# Patient Record
Sex: FEMALE | Race: WHITE | NOT HISPANIC OR LATINO | Employment: OTHER | URBAN - NONMETROPOLITAN AREA
[De-identification: names, ages, dates, MRNs, and addresses within clinical notes are randomized per-mention and may not be internally consistent; named-entity substitution may affect disease eponyms.]

---

## 2017-01-17 ENCOUNTER — HOSPITAL ENCOUNTER (EMERGENCY)
Facility: HOSPITAL | Age: 62
Discharge: HOME | End: 2017-01-17
Attending: NURSE PRACTITIONER | Admitting: NURSE PRACTITIONER
Payer: COMMERCIAL

## 2017-01-17 VITALS
RESPIRATION RATE: 16 BRPM | TEMPERATURE: 98.3 F | OXYGEN SATURATION: 96 % | HEIGHT: 71 IN | SYSTOLIC BLOOD PRESSURE: 119 MMHG | DIASTOLIC BLOOD PRESSURE: 65 MMHG

## 2017-01-17 DIAGNOSIS — H92.01 OTALGIA, RIGHT: ICD-10-CM

## 2017-01-17 PROCEDURE — 99212 OFFICE O/P EST SF 10 MIN: CPT

## 2017-01-17 PROCEDURE — 99212 OFFICE O/P EST SF 10 MIN: CPT | Performed by: NURSE PRACTITIONER

## 2017-01-17 ASSESSMENT — ENCOUNTER SYMPTOMS
RHINORRHEA: 1
SORE THROAT: 0
COUGH: 0
FEVER: 0
ABDOMINAL PAIN: 0
FACIAL SWELLING: 0

## 2017-01-17 NOTE — ED AVS SNAPSHOT
[facility]    [address] [address]    Phone:  [phone]                                       Shefali Dailey   MRN: [id number]    Department:  [facility]   Date of Visit:  1/17/2017           After Visit Summary Signature Page     I have received my discharge instructions, and my questions have been answered. I have discussed any challenges I see with this plan with the nurse or doctor.    ..........................................................................................................................................  Patient/Patient Representative Signature      ..........................................................................................................................................  Patient Representative Print Name and Relationship to Patient    ..................................................               ................................................  Date                                            Time    ..........................................................................................................................................  Reviewed by Signature/Title    ...................................................              ..............................................  Date                                                            Time

## 2017-01-17 NOTE — ED AVS SNAPSHOT
"` `     [facility]: [phone]                                              INTERAGENCY TRANSFER FORM - NURSING   2017                    Hospital Admission Date: 2017  RUDOLPH JOHNSON   : 1955  Sex: Female        Attending Provider: Mirtha Gonzales NP     Allergies:  Benoxinate Hcl, Fluorescein Sodium    Infection:  None   Service:  URGENT CARE    Ht:  1.803 m (5' 11\")   Wt:  --   Admission Wt:  --    BMI:  --   BSA:  --            Patient PCP Information     Provider PCP Type    Yosef Gonzales MD General      Current Code Status     This patient does not have a recorded code status. Please follow your organizational policy for patients in this situation.      Code Status History     This patient does not have a recorded code status. Please follow your organizational policy for patients in this situation.      Advance Directives        Does patient have a scanned Advance Directive/ACP document in EPIC?           No        Hospital Problems as of 2017     None      Non-Hospital Problems as of 2017              Priority Class Noted    Advanced care planning/counseling discussion Medium  2013      Immunizations     None         END      ASSESSMENT     Discharge Profile Flowsheet     COMMUNICATION ASSESSMENT     Patient's communication style  spoken language (English or Bilingual) 17 1636            Vitals     Vital Signs Flowsheet     VITAL SIGNS     O2 Device  None (Room air) 17 1636    Temp  98.3  F (36.8  C) 17 1636   PAIN/COMFORT      Temp src  Oral 17 1636   Patient's Stated Pain Goal  No pain 17 1636    Resp  16 17 1636   0-10 Pain Scale  4 17 1636    Heart Rate  71 17 1636   HEIGHT AND WEIGHT      Pulse/Heart Rate Source  Monitor 17 1636   Height  1.803 m (5' 11\") 17 1636    BP  119/65 mmHg 17 1636   Height Method  Stated 17 1636    OXYGEN THERAPY     Estimated Weight (From ED)  95.255 " kg (210 lb) 01/17/17 1636    SpO2  96 % 01/17/17 1636                 Patient Lines/Drains/Airways Status    Active LINES/DRAINS/AIRWAYS     Name: Placement date: Placement time: Site: Days: Last dressing change:    Peripheral IV 04/09/15 Right 04/09/15  0831    649     Incision/Surgical Site 04/09/15 Left Knee 04/09/15  0946   649             Patient Lines/Drains/Airways Status    Active PICC/CVC     **None**            Intake/Output Detail Report     None      Case Management/Discharge Planning     Case Management/Discharge Planning Flowsheet     LIVING ENVIRONMENT     QUESTION TO PATIENT: Do you feel safe going back to the place where you are living?  yes 01/17/17 1641    Lives With  significant other 04/09/15 0830   OBSERVATION: Is there reason to believe there has been maltreatment of a vulnerable adult (ie. Physical/Sexual/Emotional abuse, self neglect, lack of adequate food, shelter, medical care, or financial exploitation)?  no 01/17/17 1641    ABUSE RISK SCREEN     HOMICIDE RISK      QUESTION TO PATIENT:  Has a member of your family or a partner(now or in the past) intimidated, hurt, manipulated, or controlled you in any way?  no 01/17/17 1641   Homicidal Ideation  no 01/17/17 1641

## 2017-01-17 NOTE — ED AVS SNAPSHOT
[facility]    [address]    [facility] [address]    Phone:  [phone]                                       Shefali Dailey   MRN: [id number]    Department:  [facility]   Date of Visit:  1/17/2017           Patient Information     Date Of Birth          1955        Your diagnoses for this visit were:     Otalgia, right        You were seen by Mirtha Gonzales NP.      Follow-up Information     Follow up with Yosef Gonzales MD In 2 days.    Specialty:  Family Practice    Why:  If not improving     Contact information:     [facility]  [address] [place] [address] [phone]          Follow up with [facility].    Specialty:  EMERGENCY MEDICINE    Why:  If symptoms worsen    Contact information:    [address] [address] [phone]    Additional information:    From [place] Area: Take [address]. Turn left at [address]. Turn left at the first stoplight on [address]. At the first stop sign, take a right onto [address]. Take a left into the parking lot and continue through until you reach the North enterance of the building.       From [place]: Take [address]. Take the [address] ramp towards [place]. Turn left onto [address]. Take a slight right onto [address] [facility]. Turn left at the first stoplight on [address]. At the first stop sign, take a right onto [address]. Take a left into the parking lot and continue through until you reach the North enterance of the building.       From [place]: Take [address]. Take a right at [address]. At the first stop sign, take a right onto [address]. Take a left into the parking lot and continue through until you reach the North enterance of the building.         Discharge Instructions         Fluid in the Middle Ear, No Infection (Adult)  Earaches can happen without an infection.  This occurs when air and fluid build up behind the eardrum causing a feeling of fullness and discomfort and reduced hearing. This is called otitis media with effusion (OME) or serous otitis media. It means there is fluid in the middle ear. It is not the same as acute otitis media, which is typically from infection.  OME can happen when you have a cold if congestion blocks the passage that drains the middle ear (eustachian tube). It may also occur with nasal allergies or after a bacterial middle ear infection.    The pain/discomfort may come and go. You may hear clicking or popping sounds when you chew or swallow. You may feel that your balance is off. Or you may hear ringing in the ear.  It often takes from several weeks up to 3 months for the fluid to clear on its own. Oral pain relievers and ear drops help if there is pain. Decongestants and antihistamines sometimes help. Antibiotics don't help since there is no infection. Your doctor may prescribe a nasal spray to help reduce swelling in the nose and eustachian tube. This can allow the ear to drain.  If it doesn't improve after 3 months, surgery may be used to drain the fluid and insert a small tube in the eardrum to allow continued drainage.  Because the middle ear fluid can become infected, it is important to watch for signs of an ear infection which may develop later. These signs include increased ear pain, fever, or drainage from the ear.  Home care  The following guidelines will help you care for yourself at home:    You may use acetaminophen or ibuprofen to control pain, unless another medicine was prescribed. [NOTE: If you have chronic liver or kidney disease or ever had a stomach ulcer or GI bleeding, talk with your doctor before using these medicines.] (Aspirin should never be used in anyone under 18 years of age who is ill with a fever. It may cause severe liver damage.)    While not always helpful, you may use over-the-counter decongestants such as  phenylephrine or pseudoephedrine. Don't use nasal spray decongestants more than 3 days. Longer use can make congestion worse. (Prescription nasal sprays from your doctor don't typically have those restrictions.)    Antihistamines may help if you are also having allergy symptoms.    You may use medicines such as guaifenesin to thin mucus and promote drainage.  Follow-up care  Follow up with your doctor or as advised if you are not feeling better after 3 days.  When to seek medical care  Get prompt medical attention if any of the following occur:    Ear pain gets worse or does not start to improve     Fever of 100.4 F (38 C) or higher, or as directed by your health care provider    Fluid or blood draining from the ear    Headache or sinus pain    Stiff neck    Unusual drowsiness or confusion    5536-2471 The [employer]. [address] [place] [address]. All rights reserved. This information is not intended as a substitute for professional medical care. Always follow your healthcare professional's instructions.             Review of your medicines      Our records show that you are taking the medicines listed below. If these are incorrect, please call your family doctor or clinic.        Dose / Directions Last dose taken    ibuprofen 200 MG capsule   Dose:  600 mg        Take 600 mg by mouth every 6 hours as needed for fever   Refills:  0                Orders Needing Specimen Collection     None      Pending Results     No orders found from 1/16/2017 to 1/18/2017.            Pending Culture Results     No orders found from 1/16/2017 to 1/18/2017.            Thank you for choosing [facility]       Thank you for choosing [facility] for your care. Our goal is always to provide you with excellent care. Hearing back from our patients is one way we can continue to improve our services. Please take a few minutes to complete the written survey that you may receive in the mail after you visit with us. Thank  you!        [employer] Information     [employer] gives you secure access to your electronic health record. If you see a primary care provider, you can also send messages to your care team and make appointments. If you have questions, please call your primary care clinic.  If you do not have a primary care provider, please call [phone] and they will assist you.        Care EveryWhere ID     This is your Care EveryWhere ID. This could be used by other organizations to access your [facility] medical records  [id number]        After Visit Summary       This is your record. Keep this with you and show to your community pharmacist(s) and doctor(s) at your next visit.

## 2017-01-17 NOTE — ED PROVIDER NOTES
"  History     Chief Complaint   Patient presents with     Otalgia     Pt states pain in behind right ear going into right side of neck, starting this morning.     The history is provided by the patient. No  was used.     Shefali Dailey is a 61 year old female who presents with R ear pain radiating around, some neck pain that started this morning. Has had a runny nose. No cough.   No fever, good appetite. SO is starting chemo tomorrow and she can't be sick. Few weeks ago had GI sx, resolved now.    I have reviewed the Medications, Allergies, Past Medical and Surgical History, and Social History in the Epic system.    Review of Systems   Constitutional: Negative for fever.   HENT: Positive for ear pain and rhinorrhea. Negative for congestion, facial swelling and sore throat.    Respiratory: Negative for cough.    Gastrointestinal: Negative for abdominal pain.       Physical Exam   BP: 119/65 mmHg  Heart Rate: 71  Temp: 98.3  F (36.8  C)  Resp: 16  Height: 180.3 cm (5' 11\")  SpO2: 96 %  Physical Exam   Constitutional: She is oriented to person, place, and time. She appears well-developed and well-nourished. No distress.   HENT:   Head: Normocephalic and atraumatic.   Right Ear: Tympanic membrane and external ear normal.   Left Ear: Tympanic membrane and external ear normal.   Nose: Nose normal.   Mouth/Throat: Uvula is midline, oropharynx is clear and moist and mucous membranes are normal. No oropharyngeal exudate.   Eyes: Conjunctivae are normal. No scleral icterus.   Neck: Normal range of motion. Neck supple.   Cardiovascular: Normal rate, regular rhythm and normal heart sounds.    Pulmonary/Chest: Effort normal and breath sounds normal. No respiratory distress.   Musculoskeletal: Normal range of motion.   Lymphadenopathy:     She has no cervical adenopathy.   Neurological: She is alert and oriented to person, place, and time.   Skin: Skin is warm and dry. She is not diaphoretic.   Psychiatric: " She has a normal mood and affect.   Nursing note and vitals reviewed.      ED Course   Procedures          Patient declined lab work.    Assessments & Plan (with Medical Decision Making)     I have reviewed the nursing notes.    I have reviewed the findings, diagnosis, plan and need for follow up with the patient.  Patient verbally educated and given appropriate education sheets for each of their diagnoses and has no questions.  Take ibuprofen or Tylenol as directed on the bottle as needed.  Can use decongestant to help clear sinuses and ear pain.   Warm packs to the ear.   Return to ED/UC if symptoms worsen or concerns develop  If symptoms persist 7-10 days follow up with PCP for re-evaluation.      Final diagnoses:   Otalgia, right       1/17/2017   [facility]      Mirtha Gonzales NP  01/17/17 0999

## 2017-01-17 NOTE — DISCHARGE INSTRUCTIONS
Fluid in the Middle Ear, No Infection (Adult)  Earaches can happen without an infection. This occurs when air and fluid build up behind the eardrum causing a feeling of fullness and discomfort and reduced hearing. This is called otitis media with effusion (OME) or serous otitis media. It means there is fluid in the middle ear. It is not the same as acute otitis media, which is typically from infection.  OME can happen when you have a cold if congestion blocks the passage that drains the middle ear (eustachian tube). It may also occur with nasal allergies or after a bacterial middle ear infection.    The pain/discomfort may come and go. You may hear clicking or popping sounds when you chew or swallow. You may feel that your balance is off. Or you may hear ringing in the ear.  It often takes from several weeks up to 3 months for the fluid to clear on its own. Oral pain relievers and ear drops help if there is pain. Decongestants and antihistamines sometimes help. Antibiotics don't help since there is no infection. Your doctor may prescribe a nasal spray to help reduce swelling in the nose and eustachian tube. This can allow the ear to drain.  If it doesn't improve after 3 months, surgery may be used to drain the fluid and insert a small tube in the eardrum to allow continued drainage.  Because the middle ear fluid can become infected, it is important to watch for signs of an ear infection which may develop later. These signs include increased ear pain, fever, or drainage from the ear.  Home care  The following guidelines will help you care for yourself at home:    You may use acetaminophen or ibuprofen to control pain, unless another medicine was prescribed. [NOTE: If you have chronic liver or kidney disease or ever had a stomach ulcer or GI bleeding, talk with your doctor before using these medicines.] (Aspirin should never be used in anyone under 18 years of age who is ill with a fever. It may cause severe liver  damage.)    While not always helpful, you may use over-the-counter decongestants such as phenylephrine or pseudoephedrine. Don't use nasal spray decongestants more than 3 days. Longer use can make congestion worse. (Prescription nasal sprays from your doctor don't typically have those restrictions.)    Antihistamines may help if you are also having allergy symptoms.    You may use medicines such as guaifenesin to thin mucus and promote drainage.  Follow-up care  Follow up with your doctor or as advised if you are not feeling better after 3 days.  When to seek medical care  Get prompt medical attention if any of the following occur:    Ear pain gets worse or does not start to improve     Fever of 100.4 F (38 C) or higher, or as directed by your health care provider    Fluid or blood draining from the ear    Headache or sinus pain    Stiff neck    Unusual drowsiness or confusion    2681-1085 [employer]. [address] [place] [address], [place] [address]. All rights reserved. This information is not intended as a substitute for professional medical care. Always follow your healthcare professional's instructions.

## 2017-04-20 ENCOUNTER — TELEPHONE (OUTPATIENT)
Dept: FAMILY MEDICINE | Facility: OTHER | Age: 62
End: 2017-04-20

## 2017-04-20 ENCOUNTER — OFFICE VISIT (OUTPATIENT)
Dept: FAMILY MEDICINE | Facility: OTHER | Age: 62
End: 2017-04-20
Attending: FAMILY MEDICINE
Payer: COMMERCIAL

## 2017-04-20 ENCOUNTER — MYC MEDICAL ADVICE (OUTPATIENT)
Dept: FAMILY MEDICINE | Facility: OTHER | Age: 62
End: 2017-04-20

## 2017-04-20 VITALS
TEMPERATURE: 98 F | DIASTOLIC BLOOD PRESSURE: 72 MMHG | HEART RATE: 76 BPM | OXYGEN SATURATION: 96 % | HEIGHT: 71 IN | RESPIRATION RATE: 16 BRPM | WEIGHT: 210 LBS | SYSTOLIC BLOOD PRESSURE: 138 MMHG | BODY MASS INDEX: 29.4 KG/M2

## 2017-04-20 DIAGNOSIS — N64.4 BREAST PAIN: ICD-10-CM

## 2017-04-20 DIAGNOSIS — R07.9 LEFT SIDED CHEST PAIN: Primary | ICD-10-CM

## 2017-04-20 PROCEDURE — 71020 ZZHC CHEST TWO VIEWS, FRONT/LAT: CPT | Mod: TC | Performed by: RADIOLOGY

## 2017-04-20 PROCEDURE — 99214 OFFICE O/P EST MOD 30 MIN: CPT | Performed by: FAMILY MEDICINE

## 2017-04-20 RX ORDER — INDOMETHACIN 25 MG/1
25 CAPSULE ORAL 2 TIMES DAILY WITH MEALS
Qty: 42 CAPSULE | Refills: 1 | Status: SHIPPED | OUTPATIENT
Start: 2017-04-20 | End: 2018-01-25

## 2017-04-20 ASSESSMENT — PAIN SCALES - GENERAL: PAINLEVEL: MODERATE PAIN (5)

## 2017-04-20 NOTE — NURSING NOTE
"Chief Complaint   Patient presents with     Breast Pain     Left breast pain started yesterday. Denies injury. Bilateral mastestomy 2005 with reconstruction in 2006.       Initial /72  Pulse 76  Temp 98  F (36.7  C) (Tympanic)  Resp 16  Ht 5' 11\" (1.803 m)  Wt 210 lb (95.3 kg)  SpO2 96%  BMI 29.29 kg/m2 Estimated body mass index is 29.29 kg/(m^2) as calculated from the following:    Height as of this encounter: 5' 11\" (1.803 m).    Weight as of this encounter: 210 lb (95.3 kg).  Medication Reconciliation: complete   Rebekah Ledesma      "

## 2017-04-20 NOTE — MR AVS SNAPSHOT
After Visit Summary   4/20/2017    Shefali Dailey    MRN: [id number]           Patient Information     Date Of Birth          1955        Visit Information        Provider Department      4/20/2017 11:15 AM Yosef Gonzales MD [facility]        Today's Diagnoses     Left sided chest pain    -  1    Breast pain          Care Instructions    F/u with ongoing concerns.         Follow-ups after your visit        Additional Services     GENERAL SURG ADULT REFERRAL       Your provider has referred you to: Dr. Gill.     Please be aware that coverage of these services is subject to the terms and limitations of your health insurance plan.  Call member services at your health plan with any benefit or coverage questions.      Please bring the following with you to your appointment:    (1) Any X-Rays, CTs or MRIs which have been performed.  Contact the facility where they were done to arrange for  prior to your scheduled appointment.   (2) List of current medications   (3) This referral request   (4) Any documents/labs given to you for this referral                  Your next 10 appointments already scheduled     Apr 20, 2017 11:15 AM CDT   (Arrive by 11:00 AM)   SHORT with Yosef Gonzales MD   [facility] ([facility])    [address] [place] [address]   [phone]              Who to contact     If you have questions or need follow up information about today's clinic visit or your schedule please contact [facility] directly at [phone].  Normal or non-critical lab and imaging results will be communicated to you by MyChart, letter or phone within 4 business days after the clinic has received the results. If you do not hear from us within 7 days, please contact the clinic through MyChart or phone. If you have a critical or abnormal lab result, we will notify you by phone as soon as possible.  Submit refill requests through [employer] or  "call your pharmacy and they will forward the refill request to us. Please allow 3 business days for your refill to be completed.          Additional Information About Your Visit        [employer] Information     [employer] gives you secure access to your electronic health record. If you see a primary care provider, you can also send messages to your care team and make appointments. If you have questions, please call your primary care clinic.  If you do not have a primary care provider, please call [phone] and they will assist you.        Care EveryWhere ID     This is your Care EveryWhere ID. This could be used by other organizations to access your [place] medical records  [id number]        Your Vitals Were     Pulse Temperature Respirations Height Pulse Oximetry BMI (Body Mass Index)    76 98  F (36.7  C) (Tympanic) 16 5' 11\" (1.803 m) 96% 29.29 kg/m2       Blood Pressure from Last 3 Encounters:   04/20/17 138/72   01/17/17 119/65   05/19/16 94/58    Weight from Last 3 Encounters:   04/20/17 210 lb (95.3 kg)   05/19/16 214 lb (97.1 kg)   04/27/16 210 lb (95.3 kg)              We Performed the Following     GENERAL SURG ADULT REFERRAL     MR Breast Left w Contrast     XR CHEST 2 VW (Clinic Performed)          Today's Medication Changes          These changes are accurate as of: 4/20/17 10:34 AM.  If you have any questions, ask your nurse or doctor.               Start taking these medicines.        Dose/Directions    indomethacin 25 MG capsule   Commonly known as:  INDOCIN   Used for:  Left sided chest pain   Started by:  Yosef Gonzales MD        Dose:  25 mg   Take 1 capsule (25 mg) by mouth 2 times daily (with meals)   Quantity:  42 capsule   Refills:  1            Where to get your medicines      These medications were sent to [facility] - [address]  [address] [address]     Phone:  [phone]     indomethacin 25 MG capsule                Primary Care " Provider Office Phone # Fax #    Yosef Gonzales [phone]       [facility] [address]  [facility] [address]        Thank you!     Thank you for choosing [facility]  for your care. Our goal is always to provide you with excellent care. Hearing back from our patients is one way we can continue to improve our services. Please take a few minutes to complete the written survey that you may receive in the mail after your visit with us. Thank you!             Your Updated Medication List - Protect others around you: Learn how to safely use, store and throw away your medicines at www.disposemymeds.org.          This list is accurate as of: 4/20/17 10:34 AM.  Always use your most recent med list.                   Brand Name Dispense Instructions for use    ibuprofen 200 MG capsule      Take 600 mg by mouth every 6 hours as needed for fever       indomethacin 25 MG capsule    INDOCIN    42 capsule    Take 1 capsule (25 mg) by mouth 2 times daily (with meals)

## 2017-04-20 NOTE — TELEPHONE ENCOUNTER
12:52 PM    Reason for Call: Phone Call    Description: Shefali needs a more specific work note that states that she needs to be off the rest of today and can return to work tomorrow. Please call Shefali.  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     Was an appointment offered for this call?  No    Preferred method for responding to this message: [phone]    If we cannot reach you directly, may we leave a detailed response at the number you provided?  Yes      Alisha Noland

## 2017-04-20 NOTE — PROGRESS NOTES
Shefali Dailey    April 20, 2017    Chief Complaint   Patient presents with     Breast Pain     Left breast pain started yesterday. Denies injury. Bilateral mastestomy 2005 with reconstruction in 2006.       SUBJECTIVE:  Here for left breast pain.  Had pain about 2-3 months ago after reaching off the bed to pick something up int he left breast region.  Now having significant pain without any provocation.  It got all the way better and now this is severe without any change in anything.  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      Past Medical History:   Diagnosis Date     Malignant neoplasm of breast (female), unspecified 11/28/2005       Past Surgical History:   Procedure Laterality Date     APPENDECTOMY  2004     ARTHROSCOPY KNEE Left 4/9/2015    Procedure: ARTHROSCOPY KNEE;  Surgeon: Arden Oreilly MD;  Location: [facility]     bilateral mastectomy      Bilateral, lymph nodes removed from left side     breast reconstruction  6/2006    Bilateral, expansion with saline implants     colonoscopy with biopsy  2012     DILATION AND CURETTAGE      D&C, multiple     EGD with biopsy  2012     needle loc breast biopsy  2005    LT     RAJESH/BSO  2004     TONSILLECTOMY  1961       Current Outpatient Prescriptions   Medication Sig Dispense Refill     indomethacin (INDOCIN) 25 MG capsule Take 1 capsule (25 mg) by mouth 2 times daily (with meals) 42 capsule 1     ibuprofen 200 MG capsule Take 600 mg by mouth every 6 hours as needed for fever         Allergies   Allergen Reactions     Benoxinate Hcl Other (See Comments), Itching and Swelling     Burning  Fluress       Fluorescein Sodium Other (See Comments), Itching and Swelling     Burning  Fluress       Family History   Problem Relation Age of Onset     DIABETES Mother      Myocardial Infarction Mother 72     myocardial infarction, cause of death     Other - See Comments Father 63     liver cirrhosis      CEREBROVASCULAR DISEASE Father 64     cva, cause of death     Breast Cancer Daughter      Other - See Comments Sister      cholelithiasis     Other - See Comments Sister      DIABETES Sister      IDDM     Cancer - colorectal Sister      59     DIABETES Sister      diet controlled     Other - See Comments Sister      billary cirrhosis, stage 4       Social History     Social History     Marital status:      Spouse name: N/A     Number of children: N/A     Years of education: N/A     Occupational History     Not on file.     Social History Main Topics     Smoking status: Current Every Day Smoker     Packs/day: 0.50     Years: 34.00     Types: Cigarettes     Smokeless tobacco: Never Used      Comment: declines referral to [place] quitline     Alcohol use 0.0 oz/week     0 Standard drinks or equivalent per week      Comment: rarely     Drug use: No     Sexual activity: Not on file     Other Topics Concern     Blood Transfusions Yes     Caffeine Concern Yes     coffee 6 cups daily     Parent/Sibling W/ Cabg, Mi Or Angioplasty Before 65f 55m? No     Social History Narrative       5 point ROS negative except as noted above in HPI, including Gen., Resp., CV, GI &  system review.     OBJECTIVE:  B/P: 138/72, T: 98, P: 76, R: 16    GENERAL APPEARANCE: Alert, no acute distress  CV: regular rate and rhythm, no murmur, rub or gallop  RESP: lungs clear to auscultation bilaterally  ABDOMEN: normal bowel sounds, soft, nontender, no hepatosplenomegaly or other masses  Breasts bilateral prostheses.  Normal axillary.  I could not reporduce the pain on the left side.    SKIN: no suspicious lesions or rashes to visualized skin  NEURO: Alert, oriented x 3, speech and mentation normal    ASSESSMENT and PLAN:  (R07.9) Left sided chest pain  (primary encounter diagnosis)  Comment: consider breast.  Consider costochondritis.    Plan: XR CHEST 2 VW (Clinic Performed), GENERAL SURG         ADULT REFERRAL, MR Breast Left w Contrast,          indomethacin (INDOCIN) 25 MG capsule        Discussed.  Asking for Dr. Gill's help.  Getting breast MRI in anticipation of the visit.  Appreciate her help.  Trial of indocin in the meantime.      (N64.4) Breast pain  Comment: as above.    Plan: GENERAL SURG ADULT REFERRAL, MR Breast Left w         Contrast        Not sure if it's the breast the not.  I favor the ribs.  I am working up and treating as above.  Note for work as she can't really use the left arm in that manner.

## 2017-04-20 NOTE — TELEPHONE ENCOUNTER
The atelectasis is a direct result of not taking deep breaths, which is a direct result of the pain.  This will not affect what the MRI shows at all.  This does not make me think of any kind of cancerous process at all.  Thanks.  .Yosef Gonzales

## 2017-04-20 NOTE — LETTER
[facility]  [address]  [facility] [address]  [phone]  Dept: [phone]      4/20/2017      Re: Shefali Dailey      TO WHOM IT MAY CONCERN:    Shefali Dailey  was seen on 04/20/17.  Please excuse her on 04/20/17. She may return to work on 04/21/17 without restrictions.       Cordially,      Yosef Gonzales MD  [facility]

## 2017-04-21 ENCOUNTER — HOSPITAL ENCOUNTER (OUTPATIENT)
Dept: MRI IMAGING | Facility: HOSPITAL | Age: 62
Discharge: HOME | End: 2017-04-21
Attending: FAMILY MEDICINE | Admitting: FAMILY MEDICINE
Payer: COMMERCIAL

## 2017-04-21 PROCEDURE — A9585 GADOBUTROL INJECTION: HCPCS | Mod: TC

## 2017-04-21 PROCEDURE — 0159T ZZHC COMPUTER AIDED BREAST MRI: CPT | Mod: TC

## 2017-04-21 PROCEDURE — 77059 ZZHC MRI BREAST BILATERAL WO&W CONTRAST: CPT | Mod: TC

## 2017-04-21 RX ORDER — GADOBUTROL 604.72 MG/ML
10 INJECTION INTRAVENOUS ONCE
Status: COMPLETED | OUTPATIENT
Start: 2017-04-21 | End: 2017-04-21

## 2017-04-21 RX ADMIN — GADOBUTROL 10 ML: 604.72 INJECTION INTRAVENOUS at 11:02

## 2017-04-25 ENCOUNTER — OFFICE VISIT (OUTPATIENT)
Dept: SURGERY | Facility: OTHER | Age: 62
End: 2017-04-25
Attending: SURGERY
Payer: COMMERCIAL

## 2017-04-25 VITALS
WEIGHT: 210 LBS | SYSTOLIC BLOOD PRESSURE: 132 MMHG | HEIGHT: 71 IN | DIASTOLIC BLOOD PRESSURE: 72 MMHG | OXYGEN SATURATION: 97 % | HEART RATE: 72 BPM | TEMPERATURE: 97.2 F | BODY MASS INDEX: 29.4 KG/M2

## 2017-04-25 DIAGNOSIS — N64.4 PAIN OF BOTH BREASTS: Primary | ICD-10-CM

## 2017-04-25 DIAGNOSIS — R07.81 RIB PAIN ON LEFT SIDE: ICD-10-CM

## 2017-04-25 PROCEDURE — 99203 OFFICE O/P NEW LOW 30 MIN: CPT | Performed by: SURGERY

## 2017-04-25 RX ORDER — PREDNISONE 20 MG/1
20 TABLET ORAL 2 TIMES DAILY
Qty: 10 TABLET | Refills: 0 | Status: SHIPPED | OUTPATIENT
Start: 2017-04-25 | End: 2017-05-03

## 2017-04-25 ASSESSMENT — PAIN SCALES - GENERAL: PAINLEVEL: NO PAIN (1)

## 2017-04-25 NOTE — MR AVS SNAPSHOT
After Visit Summary   4/25/2017    Shefali Dailey    MRN: [id number]           Patient Information     Date Of Birth          1955        Visit Information        Provider Department      4/25/2017 9:30 AM [facility]        Today's Diagnoses     Pain of both breasts    -  1    Rib pain on left side          Care Instructions    Thank you for allowing Dr. Gill and our surgical team to participate in your care.  If you have a scheduling or an appointment question please contact Ashley, our Health Unit Coordinator at her direct line [phone].   ALL nursing questions or concerns can be directed to your surgical nurse at: [phone]-Estefany      Your breast MRI showed no abnormality, problem with implants or suggestion of recurrent breast disease    Hold Indocin and take the prednisone 20 mg twice daily as recommended by Dr. Gonzales.  Followup with him Wednesday, May 3, 2017 - 3:30 pm.        Follow-ups after your visit        Who to contact     If you have questions or need follow up information about today's clinic visit or your schedule please contact [facility] directly at [phone].  Normal or non-critical lab and imaging results will be communicated to you by [employer], letter or phone within 4 business days after the clinic has received the results. If you do not hear from us within 7 days, please contact the clinic through [employer] or phone. If you have a critical or abnormal lab result, we will notify you by phone as soon as possible.  Submit refill requests through [email or web address] or call your pharmacy and they will forward the refill request to us. Please allow 3 business days for your refill to be completed.          Additional Information About Your Visit        [employer] Information     [email or web address] gives you secure access to your electronic health record. If you see a primary care provider, you can also send messages to your care team and make  "appointments. If you have questions, please call your primary care clinic.  If you do not have a primary care provider, please call [phone] and they will assist you.        Care EveryWhere ID     This is your Care EveryWhere ID. This could be used by other organizations to access your [place] medical records  [id number]        Your Vitals Were     Pulse Temperature Height Pulse Oximetry BMI (Body Mass Index)       72 97.2  F (36.2  C) (Tympanic) 5' 10.5\" (1.791 m) 97% 29.71 kg/m2        Blood Pressure from Last 3 Encounters:   04/25/17 132/72   04/20/17 138/72   01/17/17 119/65    Weight from Last 3 Encounters:   04/25/17 210 lb (95.3 kg)   04/20/17 210 lb (95.3 kg)   05/19/16 214 lb (97.1 kg)              Today, you had the following     No orders found for display         Today's Medication Changes          These changes are accurate as of: 4/25/17 10:24 AM.  If you have any questions, ask your nurse or doctor.               Start taking these medicines.        Dose/Directions    predniSONE 20 MG tablet   Commonly known as:  DELTASONE   Used for:  Rib pain on left side   Started by:  Mirtha Gill MD        Dose:  20 mg   Take 1 tablet (20 mg) by mouth 2 times daily   Quantity:  10 tablet   Refills:  0            Where to get your medicines      These medications were sent to [facility] [place] - [address]  [address] [place] [address]     Phone:  [phone]     predniSONE 20 MG tablet                Primary Care Provider Office Phone # Fax #    Yosef Gonzales [phone]       [facility] [address]  [facility] [address]        Thank you!     Thank you for choosing [facility]  for your care. Our goal is always to provide you with excellent care. Hearing back from our patients is one way we can continue to improve our services. Please take a few minutes to complete the written survey that you may receive in the mail after " your visit with us. Thank you!             Your Updated Medication List - Protect others around you: Learn how to safely use, store and throw away your medicines at www.disposemymeds.org.          This list is accurate as of: 4/25/17 10:24 AM.  Always use your most recent med list.                   Brand Name Dispense Instructions for use    ibuprofen 200 MG capsule      Take 600 mg by mouth every 6 hours as needed for fever       indomethacin 25 MG capsule    INDOCIN    42 capsule    Take 1 capsule (25 mg) by mouth 2 times daily (with meals)       predniSONE 20 MG tablet    DELTASONE    10 tablet    Take 1 tablet (20 mg) by mouth 2 times daily

## 2017-04-25 NOTE — PATIENT INSTRUCTIONS
Thank you for allowing Dr. Gill and our surgical team to participate in your care.  If you have a scheduling or an appointment question please contact Ashley, our Health Unit Coordinator at her direct line [phone].   ALL nursing questions or concerns can be directed to your surgical nurse at: [phone]-Estefany      Your breast MRI showed no abnormality, problem with implants or suggestion of recurrent breast disease    Hold Indocin and take the prednisone 20 mg twice daily as recommended by Dr. Gonzales.  Followup with him Wednesday, May 3, 2017 - 3:30 pm.

## 2017-04-25 NOTE — PROGRESS NOTES
"[facility] Surgery Consultation    CC:  Left chest wall pain    HPI:  This 61 year old year old female is seen at the request of Dr. Gonzales for evaluation of chest wall pain- she notes burning pain that occurs in the axilla and spreads over the anterior reconstructed chest wall.  It can be sharp, \"takes breath away\".  The pain is intermittent, but occurs hourly.  Each occurrence lasts approximately 5-10 minutes.    The patient recalls bending over a bed to retrieve a toy for her 5 year old grandchild and the pain then developed 4 days ago.  She was worried regarding the subpectoral implant integrity.  Bilateral gadolinium enhanced MRI showed no suggestion of residual breast parenchyma or implant capsular or prosthetic embarrassment.    The musculoskeletal nature of the process was appreciated by her primary care provider who was contacted and care discussed.  A steroid pulse was considered by both he and myself and during discussion with him regarding the history and findings and fact that she has responded to NSAID treatment.  Prednisone 20 mg bid x 5 days with holding NSAID recommended and we will facilitate this order.  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    Past Medical History:   Diagnosis Date     Malignant neoplasm of breast (female), unspecified 11/28/2005     Past Surgical History:   Procedure Laterality Date     APPENDECTOMY  2004     ARTHROSCOPY KNEE Left 4/9/2015    Procedure: ARTHROSCOPY KNEE;  Surgeon: Arden Oreilly MD;  Location: [facility]     bilateral mastectomy      Bilateral, lymph nodes removed from left side     breast reconstruction  6/2006    Bilateral, expansion with saline implants     colonoscopy with biopsy  2012     DILATION AND CURETTAGE      D&C, multiple     EGD with biopsy  2012     needle loc breast biopsy  2005    LT     RAJESH/BSO  2004     " "TONSILLECTOMY  1961     Current Outpatient Prescriptions   Medication     indomethacin (INDOCIN) 25 MG capsule     ibuprofen 200 MG capsule     No current facility-administered medications for this visit.      Allergies   Allergen Reactions     Benoxinate Hcl Other (See Comments), Itching and Swelling     Burning  Fluress       Fluorescein Sodium Other (See Comments), Itching and Swelling     Burning  Fluress     Fluorescein-Benoxinate Other (See Comments)     HABITS:    Social History   Substance Use Topics     Smoking status: Current Every Day Smoker     Packs/day: 0.50     Years: 34.00     Types: Cigarettes     Smokeless tobacco: Never Used      Comment: declines referral to [place] quitline     Alcohol use 0.0 oz/week     0 Standard drinks or equivalent per week      Comment: rarely       Family History   Problem Relation Age of Onset     DIABETES Mother      Myocardial Infarction Mother 72     myocardial infarction, cause of death     Other - See Comments Father 63     liver cirrhosis     CEREBROVASCULAR DISEASE Father 64     cva, cause of death     Breast Cancer Daughter      Other - See Comments Sister      cholelithiasis     Other - See Comments Sister      DIABETES Sister      IDDM     Cancer - colorectal Sister      59     DIABETES Sister      diet controlled     Other - See Comments Sister      billary cirrhosis, stage 4       REVIEW OF SYSTEMS:  Ten point review of systems negative except those mentioned in the HPI.     OBJECTIVE:    /72 (BP Location: Left arm, Patient Position: Chair, Cuff Size: Adult Regular)  Pulse 72  Temp 97.2  F (36.2  C) (Tympanic)  Ht 5' 10.5\" (1.791 m)  Wt 210 lb (95.3 kg)  SpO2 97%  BMI 29.71 kg/m2    GENERAL: Generally appears well, in no distress with appropriate affect.  HEENT:   Sclerae anicteric - No cervical, supra/infraclavciular lymphadenopathy, no thyroid masses  Respiratory:  Lungs clear to ausculation bilaterally with good air excursion  Cardiovascular:  " Regular Rate and Rhythm with no murmurs gallops or rubs, normal     There is tenderness when palpating the 4th rib in the anterior axillary line    Breast Examination:  In the seated position with the arms elevated, there is no contour distortion, skin change or nipple abnormality.  Bilateral reconstruction with good cosmetic result noted.  There is no contour or skin change suggesting recurrence.    In the supine position, the right chest wall and axilla show no abnormality.  The left breast shows a similar homogeneity devoid of abnormality.   There is tenderness in the left axillary apex at the latissimus border and along the intercostal muscles in the 3rd interspace, anterior axillary line.  There is no suggestion of mass or adenoparhy.  :  deferred  Extremities:  Extremities normal. No deformities, edema, or skin discoloration.  Skin:  no suspicious lesions or rashes  Neurological: grossly intact    Psych:  Alert, oriented, affect appropriate with normal decision making ability.    Imaging:  Reviewed.  BILATERAL BREAST MRI WITH/WITHOUT CONTRAST     INDICATION: A 61-year-old female with a history of bilateral  mastectomies in 2005 for breast cancer, who presents with left breast  pain.     COMPARISON: None available.     TECHNIQUE: Multiplanar MRI of the bilateral breasts was performed  including fluid and silicone suppressed sequences, as well as dynamic  post-contrast imaging. CAD stream was used for the calculation of  contrast enhancement kinetic curves and MIP reconstruction.     FINDINGS: Postoperative changes of bilateral mastectomies with  implant reconstruction are seen. No residual breast parenchymal  tissue is identified. No suspicious anterior chest wall enhancement  is seen.     The bilateral subpectoral breast implants are intact, without evidence  of intracapsular or extracapsular rupture.     No suspicious axillary mass or enhancement is seen.     No suspicious extramammary enhancement is  seen.     IMPRESSION: EXPECTED POSTOPERATIVE APPEARANCE FOLLOWING BILATERAL  MASTECTOMIES WITH IMPLANT RECONSTRUCTION. NO EVIDENCE OF IMPLANT  RUPTURE OR SUSPICIOUS ENHANCEMENT. BENIGN FINDINGS.       IMPRESSION:  Musculoskeletal pain, left chest wall, axilla with intercostal involvement.  No evidence of adenopathy or metastatic breast process.    PLAN:  Pathophysiology outlined in detail with management reviewed.  She wishes to continue work and, avoidance of use of the muscle groups suggested along with requesting cases where resting these muscle regions permissible reviewed.  She will speak with nursing managers.  Heat, NSAID and rest recommended.  Return non-resolution.    Dr. Gonzales contacted and findings discussed.  In view of desire to continue working, a short steroid course is recommended and his suggested orders were entered    Mirtha Gill MD, FACS    4/25/2017  9:57 AM    cc:  Yosef Gonzales MD

## 2017-05-03 ENCOUNTER — OFFICE VISIT (OUTPATIENT)
Dept: FAMILY MEDICINE | Facility: OTHER | Age: 62
End: 2017-05-03
Attending: FAMILY MEDICINE
Payer: COMMERCIAL

## 2017-05-03 VITALS
SYSTOLIC BLOOD PRESSURE: 118 MMHG | WEIGHT: 210 LBS | HEIGHT: 71 IN | DIASTOLIC BLOOD PRESSURE: 60 MMHG | OXYGEN SATURATION: 93 % | TEMPERATURE: 97.4 F | HEART RATE: 76 BPM | RESPIRATION RATE: 16 BRPM | BODY MASS INDEX: 29.4 KG/M2

## 2017-05-03 DIAGNOSIS — R07.89 ATYPICAL CHEST PAIN: Primary | ICD-10-CM

## 2017-05-03 DIAGNOSIS — Z71.89 ACP (ADVANCE CARE PLANNING): Chronic | ICD-10-CM

## 2017-05-03 DIAGNOSIS — R10.13 ABDOMINAL PAIN, EPIGASTRIC: ICD-10-CM

## 2017-05-03 LAB
BASOPHILS # BLD AUTO: 0 10E9/L (ref 0–0.2)
BASOPHILS NFR BLD AUTO: 0.2 %
DIFFERENTIAL METHOD BLD: NORMAL
EOSINOPHIL # BLD AUTO: 0.2 10E9/L (ref 0–0.7)
EOSINOPHIL NFR BLD AUTO: 1.5 %
ERYTHROCYTE [DISTWIDTH] IN BLOOD BY AUTOMATED COUNT: 14.3 % (ref 10–15)
HCT VFR BLD AUTO: 43.1 % (ref 35–47)
HGB BLD-MCNC: 14.3 G/DL (ref 11.7–15.7)
LYMPHOCYTES # BLD AUTO: 3.3 10E9/L (ref 0.8–5.3)
LYMPHOCYTES NFR BLD AUTO: 29.6 %
MCH RBC QN AUTO: 29.9 PG (ref 26.5–33)
MCHC RBC AUTO-ENTMCNC: 33.2 G/DL (ref 31.5–36.5)
MCV RBC AUTO: 90 FL (ref 78–100)
MONOCYTES # BLD AUTO: 0.8 10E9/L (ref 0–1.3)
MONOCYTES NFR BLD AUTO: 7.2 %
NEUTROPHILS # BLD AUTO: 6.8 10E9/L (ref 1.6–8.3)
NEUTROPHILS NFR BLD AUTO: 61.5 %
PLATELET # BLD AUTO: 215 10E9/L (ref 150–450)
RBC # BLD AUTO: 4.79 10E12/L (ref 3.8–5.2)
WBC # BLD AUTO: 11 10E9/L (ref 4–11)

## 2017-05-03 PROCEDURE — 83690 ASSAY OF LIPASE: CPT | Performed by: FAMILY MEDICINE

## 2017-05-03 PROCEDURE — 80053 COMPREHEN METABOLIC PANEL: CPT | Performed by: FAMILY MEDICINE

## 2017-05-03 PROCEDURE — 85025 COMPLETE CBC W/AUTO DIFF WBC: CPT | Performed by: FAMILY MEDICINE

## 2017-05-03 PROCEDURE — 99214 OFFICE O/P EST MOD 30 MIN: CPT | Performed by: FAMILY MEDICINE

## 2017-05-03 PROCEDURE — 36415 COLL VENOUS BLD VENIPUNCTURE: CPT | Performed by: FAMILY MEDICINE

## 2017-05-03 ASSESSMENT — PAIN SCALES - GENERAL: PAINLEVEL: NO PAIN (0)

## 2017-05-03 NOTE — PROGRESS NOTES
Shefali J Ashlie    May 3, 2017    Chief Complaint   Patient presents with     Pain     follow up     *_* Health Care Directive *_*     has paperwork at home       SUBJECTIVE:  Here for f/u.  Having sharp chest pain, intermittent.  R/o cardiac with stress test years ago, and does not want to repeat.  She gets some gerd.  These sx are intermittent.  The chest pain, thought to be chest wall pain, is resolved.      Past Medical History:   Diagnosis Date     Malignant neoplasm of breast (female), unspecified 11/28/2005       Past Surgical History:   Procedure Laterality Date     APPENDECTOMY  2004     ARTHROSCOPY KNEE Left 4/9/2015    Procedure: ARTHROSCOPY KNEE;  Surgeon: Arden Oreilly MD;  Location: [facility]     bilateral mastectomy      Bilateral, lymph nodes removed from left side     breast reconstruction  6/2006    Bilateral, expansion with saline implants     colonoscopy with biopsy  2012     DILATION AND CURETTAGE      D&C, multiple     EGD with biopsy  2012     needle loc breast biopsy  2005    LT     RAJESH/BSO  2004     TONSILLECTOMY  1961       Current Outpatient Prescriptions   Medication Sig Dispense Refill     indomethacin (INDOCIN) 25 MG capsule Take 1 capsule (25 mg) by mouth 2 times daily (with meals) 42 capsule 1     ibuprofen 200 MG capsule Take 600 mg by mouth every 6 hours as needed for fever         Allergies   Allergen Reactions     Benoxinate Hcl Other (See Comments), Itching and Swelling     Burning  Fluress       Fluorescein Sodium Other (See Comments), Itching and Swelling     Burning  Fluress     Fluorescein-Benoxinate Other (See Comments)       Family History   Problem Relation Age of Onset     DIABETES Mother      Myocardial Infarction Mother 72     myocardial infarction, cause of death     Other - See Comments Father 63     liver cirrhosis     CEREBROVASCULAR DISEASE Father 64     cva, cause of death     Breast Cancer Daughter      Other - See Comments Sister      cholelithiasis     Other -  See Comments Sister      DIABETES Sister      IDDM     Cancer - colorectal Sister      59     DIABETES Sister      diet controlled     Other - See Comments Sister      yamileth cirrhosis, stage 4       Social History     Social History     Marital status:      Spouse name: N/A     Number of children: N/A     Years of education: N/A     Occupational History     Not on file.     Social History Main Topics     Smoking status: Current Every Day Smoker     Packs/day: 0.50     Years: 34.00     Types: Cigarettes     Smokeless tobacco: Never Used      Comment: declines referral to [place] quitline     Alcohol use 0.0 oz/week     0 Standard drinks or equivalent per week      Comment: rarely     Drug use: No     Sexual activity: Not on file     Other Topics Concern     Blood Transfusions Yes     Caffeine Concern Yes     coffee 6 cups daily     Parent/Sibling W/ Cabg, Mi Or Angioplasty Before 65f 55m? No     Social History Narrative       5 point ROS negative except as noted above in HPI, including Gen., Resp., CV, GI &  system review.     OBJECTIVE:  B/P: 118/60, T: 97.4, P: 76, R: 16    GENERAL APPEARANCE: Alert, no acute distress  CV: regular rate and rhythm, no murmur, rub or gallop  RESP: lungs clear to auscultation bilaterally  ABDOMEN: normal bowel sounds, soft, nontender, no hepatosplenomegaly or other masses  SKIN: no suspicious lesions or rashes to visualized skin  NEURO: Alert, oriented x 3, speech and mentation normal    ASSESSMENT and PLAN:  (R07.89) Atypical chest pain  (primary encounter diagnosis)  Comment: reviewed at some length.    Plan: Comprehensive metabolic panel (BMP + Alb, Alk         Phos, ALT, AST, Total. Bili, TP), Lipase, CBC         with platelets and differential, US Abdomen         Limited        She has been worked up by cardiac.  Thinking GI cause.  She is very well read about this kind of stuff.  Going to get GB US, labs, and ask Dr. Soriano to see for consideration of EGD or other  indicated testing.  Appreciate his help.    (R10.13) Abdominal pain, epigastric  Comment:  As above.    Plan: Comprehensive metabolic panel (BMP + Alb, Alk         Phos, ALT, AST, Total. Bili, TP), Lipase, CBC         with platelets and differential, US Abdomen         Limited        As above.

## 2017-05-03 NOTE — NURSING NOTE
"Chief Complaint   Patient presents with     Pain     follow up     *_* Health Care Directive *_*     has paperwork at home       Initial /60 (BP Location: Right arm, Patient Position: Chair, Cuff Size: Adult Large)  Pulse 76  Temp 97.4  F (36.3  C) (Tympanic)  Resp 16  Ht 5' 10.5\" (1.791 m)  Wt 210 lb (95.3 kg)  SpO2 93%  BMI 29.71 kg/m2 Estimated body mass index is 29.71 kg/(m^2) as calculated from the following:    Height as of this encounter: 5' 10.5\" (1.791 m).    Weight as of this encounter: 210 lb (95.3 kg).  Medication Reconciliation: complete   Shania Cobos LPN      "

## 2017-05-03 NOTE — MR AVS SNAPSHOT
After Visit Summary   5/3/2017    Shefali Dailey    MRN: [id number]           Patient Information     Date Of Birth          1955        Visit Information        Provider Department      5/3/2017 3:45 PM Yosef Gonzales MD [facility]        Today's Diagnoses     Atypical chest pain    -  1    ACP (advance care planning)        Abdominal pain, epigastric          Care Instructions    F/u with ongoing concerns.         Follow-ups after your visit        Additional Services     GENERAL SURG ADULT REFERRAL       Your provider has referred you to: Dr. Soriano. Consideration of EGD    Please be aware that coverage of these services is subject to the terms and limitations of your health insurance plan.  Call member services at your health plan with any benefit or coverage questions.      Please bring the following with you to your appointment:    (1) Any X-Rays, CTs or MRIs which have been performed.  Contact the facility where they were done to arrange for  prior to your scheduled appointment.   (2) List of current medications   (3) This referral request   (4) Any documents/labs given to you for this referral                  Your next 10 appointments already scheduled     Jun 19, 2017 11:00 AM CDT   (Arrive by 10:45 AM)   New Visit with Ashkan Soriano DO   [facility] ([facility])    [address] [place] [address] [facility] [address]   [phone]              Who to contact     If you have questions or need follow up information about today's clinic visit or your schedule please contact [facility] directly at [phone].  Normal or non-critical lab and imaging results will be communicated to you by MyChart, letter or phone within 4 business days after the clinic has received the results. If you do not hear from us within 7 days, please contact the clinic through MyChart or phone. If you have a critical or abnormal lab result, we will notify you by  "phone as soon as possible.  Submit refill requests through [employer] or call your pharmacy and they will forward the refill request to us. Please allow 3 business days for your refill to be completed.          Additional Information About Your Visit        [employer] Information     [employer] gives you secure access to your electronic health record. If you see a primary care provider, you can also send messages to your care team and make appointments. If you have questions, please call your primary care clinic.  If you do not have a primary care provider, please call [phone] and they will assist you.        Care EveryWhere ID     This is your Care EveryWhere ID. This could be used by other organizations to access your [facility] medical records  [id number]        Your Vitals Were     Pulse Temperature Respirations Height Pulse Oximetry BMI (Body Mass Index)    76 97.4  F (36.3  C) (Tympanic) 16 5' 10.5\" (1.791 m) 93% 29.71 kg/m2       Blood Pressure from Last 3 Encounters:   05/03/17 118/60   04/25/17 132/72   04/20/17 138/72    Weight from Last 3 Encounters:   05/03/17 210 lb (95.3 kg)   04/25/17 210 lb (95.3 kg)   04/20/17 210 lb (95.3 kg)              We Performed the Following     CBC with platelets and differential     Comprehensive metabolic panel (BMP + Alb, Alk Phos, ALT, AST, Total. Bili, TP)     GENERAL SURG ADULT REFERRAL     Lipase     US Abdomen Limited        Primary Care Provider Office Phone # Fax #    Yosef Gonzales [phone]       [address] [address]        Thank you!     Thank you for choosing [facility]  for your care. Our goal is always to provide you with excellent care. Hearing back from our patients is one way we can continue to improve our services. Please take a few minutes to complete the written survey that you may receive in the mail after your visit with us. Thank you!             Your Updated Medication List - " Protect others around you: Learn how to safely use, store and throw away your medicines at www.disposemymeds.org.          This list is accurate as of: 5/3/17  4:28 PM.  Always use your most recent med list.                   Brand Name Dispense Instructions for use    ibuprofen 200 MG capsule      Take 600 mg by mouth every 6 hours as needed for fever       indomethacin 25 MG capsule    INDOCIN    42 capsule    Take 1 capsule (25 mg) by mouth 2 times daily (with meals)

## 2017-05-04 LAB
ALBUMIN SERPL-MCNC: 4 G/DL (ref 3.4–5)
ALP SERPL-CCNC: 76 U/L (ref 40–150)
ALT SERPL W P-5'-P-CCNC: 21 U/L (ref 0–50)
ANION GAP SERPL CALCULATED.3IONS-SCNC: 11 MMOL/L (ref 3–14)
AST SERPL W P-5'-P-CCNC: 8 U/L (ref 0–45)
BILIRUB SERPL-MCNC: 0.2 MG/DL (ref 0.2–1.3)
BUN SERPL-MCNC: 31 MG/DL (ref 7–30)
CALCIUM SERPL-MCNC: 9.1 MG/DL (ref 8.5–10.1)
CHLORIDE SERPL-SCNC: 105 MMOL/L (ref 94–109)
CO2 SERPL-SCNC: 25 MMOL/L (ref 20–32)
CREAT SERPL-MCNC: 0.78 MG/DL (ref 0.52–1.04)
GFR SERPL CREATININE-BSD FRML MDRD: 76 ML/MIN/1.7M2
GLUCOSE SERPL-MCNC: 119 MG/DL (ref 70–99)
LIPASE SERPL-CCNC: 205 U/L (ref 73–393)
POTASSIUM SERPL-SCNC: 3.6 MMOL/L (ref 3.4–5.3)
PROT SERPL-MCNC: 7.2 G/DL (ref 6.8–8.8)
SODIUM SERPL-SCNC: 141 MMOL/L (ref 133–144)

## 2017-05-19 ENCOUNTER — OFFICE VISIT (OUTPATIENT)
Dept: CHIROPRACTIC MEDICINE | Facility: OTHER | Age: 62
End: 2017-05-19
Attending: CHIROPRACTOR
Payer: COMMERCIAL

## 2017-05-19 DIAGNOSIS — M99.02 SEGMENTAL AND SOMATIC DYSFUNCTION OF THORACIC REGION: Primary | ICD-10-CM

## 2017-05-19 DIAGNOSIS — M99.01 SEGMENTAL AND SOMATIC DYSFUNCTION OF CERVICAL REGION: ICD-10-CM

## 2017-05-19 DIAGNOSIS — M99.03 SEGMENTAL AND SOMATIC DYSFUNCTION OF LUMBAR REGION: ICD-10-CM

## 2017-05-19 DIAGNOSIS — M54.50 ACUTE LEFT-SIDED LOW BACK PAIN WITHOUT SCIATICA: ICD-10-CM

## 2017-05-19 PROCEDURE — 99202 OFFICE O/P NEW SF 15 MIN: CPT | Mod: 25 | Performed by: CHIROPRACTOR

## 2017-05-19 PROCEDURE — 98941 CHIROPRACT MANJ 3-4 REGIONS: CPT | Performed by: CHIROPRACTOR

## 2017-05-19 NOTE — MR AVS SNAPSHOT
After Visit Summary   5/19/2017    Shefali Dailey    MRN: [id number]           Patient Information     Date Of Birth          1955        Visit Information        Provider Department      5/19/2017 9:10 AM Re[facility] Ruby [facility]        Today's Diagnoses     Segmental and somatic dysfunction of thoracic region    -  1    Acute left-sided low back pain without sciatica        Segmental and somatic dysfunction of lumbar region        Segmental and somatic dysfunction of cervical region           Follow-ups after your visit        Your next 10 appointments already scheduled     May 25, 2017  3:50 PM CDT   [facility] Visit with Reyes Ruby DC   [facility]    [address]  [facility] [address]   [phone]            May 31, 2017  8:00 AM CDT   Radiology with HI ULTRASOUND 2   [facility] Ul[facility]ound ([facility] )    [address]  [facility] [address]   [phone]            Jun 19, 2017 11:00 AM CDT   (Arrive by 10:45 AM)   New Visit with Ashkan Soriano DO   [facility])    [address] [facility] [address]   [phone]              Who to contact     If you have questions or need follow up information about today's clinic visit or your schedule please contact  [facility] directly at [phone].  Normal or non-critical lab and imaging results will be communicated to you by MyChart, letter or phone within 4 business days after the clinic has received the results. If you do not hear from us within 7 days, please contact the clinic through MyChart or phone. If you have a critical or abnormal lab result, we will notify you by phone as soon as possible.  Submit refill requests through [employer] or call your pharmacy and they will forward the refill request to us. Please allow 3 business days for your refill to be completed.          Additional Information About Your Visit        [facility]  Information     [employer] gives you secure access to your electronic health record. If you see a primary care provider, you can also send messages to your care team and make appointments. If you have questions, please call your primary care clinic.  If you do not have a primary care provider, please call [phone] and they will assist you.        Care EveryWhere ID     This is your Care EveryWhere ID. This could be used by other organizations to access your [facility] medical records  [id number]         Blood Pressure from Last 3 Encounters:   05/03/17 118/60   04/25/17 132/72   04/20/17 138/72    Weight from Last 3 Encounters:   05/03/17 210 lb (95.3 kg)   04/25/17 210 lb (95.3 kg)   04/20/17 210 lb (95.3 kg)              We Performed the Following     CHIROPRAC MANIP,SPINAL,3-4 REGIONS        Primary Care Provider Office Phone # Fax #    Yosef Gonzales [phone]       [address] [address]        Thank you!     Thank you for choosing  [facility]  for your care. Our goal is always to provide you with excellent care. Hearing back from our patients is one way we can continue to improve our services. Please take a few minutes to complete the written survey that you may receive in the mail after your visit with us. Thank you!             Your Updated Medication List - Protect others around you: Learn how to safely use, store and throw away your medicines at www.disposemymeds.org.          This list is accurate as of: 5/19/17 11:59 PM.  Always use your most recent med list.                   Brand Name Dispense Instructions for use    ibuprofen 200 MG capsule      Take 600 mg by mouth every 6 hours as needed for fever       indomethacin 25 MG capsule    INDOCIN    42 capsule    Take 1 capsule (25 mg) by mouth 2 times daily (with meals)

## 2017-05-22 ENCOUNTER — OFFICE VISIT (OUTPATIENT)
Dept: CHIROPRACTIC MEDICINE | Facility: OTHER | Age: 62
End: 2017-05-22
Attending: CHIROPRACTOR
Payer: COMMERCIAL

## 2017-05-22 DIAGNOSIS — M99.02 SEGMENTAL AND SOMATIC DYSFUNCTION OF THORACIC REGION: Primary | ICD-10-CM

## 2017-05-22 DIAGNOSIS — M54.50 ACUTE BILATERAL LOW BACK PAIN WITHOUT SCIATICA: ICD-10-CM

## 2017-05-22 DIAGNOSIS — M99.01 SEGMENTAL AND SOMATIC DYSFUNCTION OF CERVICAL REGION: ICD-10-CM

## 2017-05-22 DIAGNOSIS — M99.03 SEGMENTAL AND SOMATIC DYSFUNCTION OF LUMBAR REGION: ICD-10-CM

## 2017-05-22 PROCEDURE — 98941 CHIROPRACT MANJ 3-4 REGIONS: CPT | Performed by: CHIROPRACTOR

## 2017-05-22 NOTE — MR AVS SNAPSHOT
After Visit Summary   5/22/2017    Shefali Dailey    MRN: [id number]           Patient Information     Date Of Birth          1955        Visit Information        Provider Department      5/22/2017 3:40 PM [facility]  [facility]        Today's Diagnoses     Segmental and somatic dysfunction of thoracic region    -  1    Acute bilateral low back pain without sciatica        Segmental and somatic dysfunction of lumbar region        Segmental and somatic dysfunction of cervical region           Follow-ups after your visit        Your next 10 appointments already scheduled     May 25, 2017  3:50 PM CDT   Return Visit with Reyes Ruby DC   [facility] ([facility])    [address] [place] [address]   [phone]            May 31, 2017  8:00 AM CDT   Radiology with HI ULTRASOUND 2   [facility] ([facility] )    [address]  [facility] [address]   [phone]            Jun 19, 2017 11:00 AM CDT   (Arrive by 10:45 AM)   New Visit with Ashkan Soriano DO   [facility] ([facility])    [address] [place] [address]   [phone]              Who to contact     If you have questions or need follow up information about today's clinic visit or your schedule please contact  [facility] directly at [phone].  Normal or non-critical lab and imaging results will be communicated to you by MyChart, letter or phone within 4 business days after the clinic has received the results. If you do not hear from us within 7 days, please contact the clinic through MyChart or phone. If you have a critical or abnormal lab result, we will notify you by phone as soon as possible.  Submit refill requests through [employer] or call your pharmacy and they will forward the refill request to us. Please allow 3 business days for your refill to be completed.          Additional Information About Your Visit        [facility]  Information     [employer] gives you secure access to your electronic health record. If you see a primary care provider, you can also send messages to your care team and make appointments. If you have questions, please call your primary care clinic.  If you do not have a primary care provider, please call [phone] and they will assist you.        Care EveryWhere ID     This is your Care EveryWhere ID. This could be used by other organizations to access your [facility] medical records  [id number]         Blood Pressure from Last 3 Encounters:   05/03/17 118/60   04/25/17 132/72   04/20/17 138/72    Weight from Last 3 Encounters:   05/03/17 210 lb (95.3 kg)   04/25/17 210 lb (95.3 kg)   04/20/17 210 lb (95.3 kg)              We Performed the Following     CHIROPRAC MANIP,SPINAL,3-4 REGIONS        Primary Care Provider Office Phone # Fax #    Yosef Gonzales [phone]       [address] [address]        Thank you!     Thank you for choosing  [facility]  for your care. Our goal is always to provide you with excellent care. Hearing back from our patients is one way we can continue to improve our services. Please take a few minutes to complete the written survey that you may receive in the mail after your visit with us. Thank you!             Your Updated Medication List - Protect others around you: Learn how to safely use, store and throw away your medicines at www.disposemymeds.org.          This list is accurate as of: 5/22/17 11:59 PM.  Always use your most recent med list.                   Brand Name Dispense Instructions for use    ibuprofen 200 MG capsule      Take 600 mg by mouth every 6 hours as needed for fever       indomethacin 25 MG capsule    INDOCIN    42 capsule    Take 1 capsule (25 mg) by mouth 2 times daily (with meals)

## 2017-05-24 NOTE — PROGRESS NOTES
Subjective Finding:    Chief compalint: Patient presents with:  Back Pain: left rib pain  , Pain Scale: 9/10, Intensity: sharp, Duration: 2 days, Radiating: no.    Date of injury:     Activities that the pain restricts:   Home/household/hobbies/social activities: yes.  Work duties: yes.  Sleep: yes.  Makes symptoms better: rest.  Makes symptoms worse: activity.  Have you seen anyone else for the symptoms? No.  Work related: no.  Automobile related injury: no.    Objective and Assessment:    Posture Analysis:   High shoulder: left.  Head tilt: left.  High iliac crest: left.  Head carriage: neutral.  Thoracic Kyphosis: forward.  Lumbar Lordosis: neutral.    Lumbar Range of Motion: extension decreased.  Cervical Range of Motion: extension decreased.  Thoracic Range of Motion: extension decreased and left lateral flexion decreased.  Extremity Range of Motion: .    Palpation:   T paraspinals: sharp pain, no    Segmental dysfunction pre-treatment and treatment area: C6, T4, T7, T8, T9 and L3.    Assessment post-treatment:  Cervical: ROM increased.  Thoracic: ROM increased.  Lumbar: ROM increased.    Comments: .      Complicating Factors: .    Procedure(s):  [facility]:  [address] Chiropractic manipulative treatment 3-4 regions performed   Cervical: Diversified, See above for level, Supine, Thoracic: Diversified, See above for level, Prone and Lumbar: Diversified, See above for level, Side posture    Modalities:  None performed this visit    Therapeutic procedures:  None    Plan:  Treatment plan: PRN.  Instructed patient: stretch as instructed at visit.  Short term goals: reduce pain.  Long term goals: increase ADL.  Prognosis: very good.

## 2017-05-24 NOTE — PROGRESS NOTES
Subjective Finding:    Chief compalint: Patient presents with:  Back Pain: rib pain is doing better  , Pain Scale: 9/10, Intensity: sharp, Duration: 2 days, Radiating: no.    Date of injury:     Activities that the pain restricts:   Home/household/hobbies/social activities: yes.  Work duties: yes.  Sleep: yes.  Makes symptoms better: rest.  Makes symptoms worse: activity.  Have you seen anyone else for the symptoms? No.  Work related: no.  Automobile related injury: no.    Objective and Assessment:    Posture Analysis:   High shoulder: left.  Head tilt: left.  High iliac crest: left.  Head carriage: neutral.  Thoracic Kyphosis: forward.  Lumbar Lordosis: neutral.    Lumbar Range of Motion: extension decreased.  Cervical Range of Motion: extension decreased.  Thoracic Range of Motion: extension decreased and left lateral flexion decreased.  Extremity Range of Motion: .    Palpation:   T paraspinals: sharp pain, no    Segmental dysfunction pre-treatment and treatment area: C6, T4, T7, T8, T9 and L3.    Assessment post-treatment:  Cervical: ROM increased.  Thoracic: ROM increased.  Lumbar: ROM increased.    Comments: .      Complicating Factors: .    Procedure(s):  CM[facility]:  28301 Chiropractic manipulative treatment 3-4 regions performed   Cervical: Diversified, See above for level, Supine, Thoracic: Diversified, See above for level, Prone and Lumbar: Diversified, See above for level, Side posture    Modalities:  None performed this visit    Therapeutic procedures:  None    Plan:  Treatment plan: PRN.  Instructed patient: stretch as instructed at visit.  Short term goals: reduce pain.  Long term goals: increase ADL.  Prognosis: very good.

## 2017-08-24 ENCOUNTER — APPOINTMENT (OUTPATIENT)
Dept: GENERAL RADIOLOGY | Facility: OTHER | Age: 62
End: 2017-08-24
Attending: ORTHOPAEDIC SURGERY
Payer: COMMERCIAL

## 2017-08-24 ENCOUNTER — OFFICE VISIT (OUTPATIENT)
Dept: ORTHOPEDICS | Facility: OTHER | Age: 62
End: 2017-08-24
Attending: ORTHOPAEDIC SURGERY
Payer: COMMERCIAL

## 2017-08-24 VITALS
OXYGEN SATURATION: 98 % | DIASTOLIC BLOOD PRESSURE: 58 MMHG | BODY MASS INDEX: 29.4 KG/M2 | HEIGHT: 71 IN | TEMPERATURE: 97.9 F | SYSTOLIC BLOOD PRESSURE: 110 MMHG | HEART RATE: 69 BPM | WEIGHT: 210 LBS

## 2017-08-24 DIAGNOSIS — M17.12 PRIMARY OSTEOARTHRITIS OF LEFT KNEE: Primary | ICD-10-CM

## 2017-08-24 DIAGNOSIS — M71.22 BAKER'S CYST, LEFT: ICD-10-CM

## 2017-08-24 DIAGNOSIS — Z72.0 TOBACCO ABUSE: ICD-10-CM

## 2017-08-24 DIAGNOSIS — M25.562 CHRONIC PAIN OF LEFT KNEE: Primary | ICD-10-CM

## 2017-08-24 DIAGNOSIS — G89.29 CHRONIC PAIN OF LEFT KNEE: Primary | ICD-10-CM

## 2017-08-24 PROCEDURE — 20610 DRAIN/INJ JOINT/BURSA W/O US: CPT | Mod: LT | Performed by: ORTHOPAEDIC SURGERY

## 2017-08-24 PROCEDURE — 99213 OFFICE O/P EST LOW 20 MIN: CPT | Mod: 25 | Performed by: ORTHOPAEDIC SURGERY

## 2017-08-24 PROCEDURE — 73564 X-RAY EXAM KNEE 4 OR MORE: CPT | Mod: TC | Performed by: RADIOLOGY

## 2017-08-24 ASSESSMENT — PAIN SCALES - GENERAL: PAINLEVEL: MODERATE PAIN (5)

## 2017-08-24 NOTE — MR AVS SNAPSHOT
After Visit Summary   8/24/2017    Shefali Dailey    MRN: [id number]           Patient Information     Date Of Birth          1955        Visit Information        Provider Department      8/24/2017 1:20 PM Arden Oreilly MD  ORTHOPEDICS        Today's Diagnoses     Primary osteoarthritis of left knee    -  1    Baker's cyst, left        Tobacco abuse          Care Instructions      HOW TO QUIT SMOKING  Smoking is one of the hardest habits to break. About half of all those who have ever smoked have been able to quit, and most of those (about 70%) who still smoke want to quit. Here are some of the best ways to stop smoking.     KEEP TRYING:  It takes most smokers about 8 tries before they are finally able to fully quit. So, the more often you try and fail, the better your chance of quitting the next time! So, don't give up!    GO COLD TURKEY:  Most ex-smokers quit cold turkey. Trying to cut back gradually doesn't seem to work as well, perhaps because it continues the smoking habit. Also, it is possible to fool yourself by inhaling more while smoking fewer cigarettes. This results in the same amount of nicotine in your body!    GET SUPPORT:  Support programs can make an important difference, especially for the heavy smoker. These groups offer lectures, methods to change your behavior and peer support. Call the free national Quitline for more information. 800-QUIT-NOW (149-992-5577). Low-cost or free programs are offered by many hospitals, local chapters of the American Lung Association (366-149-7343) and the American Cancer Society (031-804-0771). Support at home is important too. Non-smokers can help by offering praise and encouragement. If the smoker fails to quit, encourage them to try again!    OVER-THE-COUNTER MEDICINES:  For those who can't quit on their own, Nicotine Replacement Therapy (NRT) may make quitting much easier. Certain aids such as the nicotine patch, gum and lozenge are  available without a prescription. However, it is best to use these under the guidance of your doctor. The skin patch provides a steady supply of nicotine to the body. Nicotine gum and lozenge gives temporary bursts of low levels of nicotine. Both methods take the edge off the craving for cigarettes. WARNING: If you feel symptoms of nicotine overdose, such as nausea, vomiting, dizziness, weakness, or fast heartbeat, stop using these and see your doctor.    PRESCRIPTION MEDICINES:  After evaluating your smoking patterns and prior attempts at quitting, your doctor may offer a prescription medicine such as bupropion (Zyban, Wellbutrin), varenicline (Chantix, Champix), a niocotine inhaler or nasal spray. Each has its unique advantage and side effects which your doctor can review with you.    HEALTH BENEFITS OF QUITTING:  The benefits of quitting start right away and keep improving the longer you go without smokin minutes: blood pressure and pulse return to normal  8 hours: oxygen levels return to normal  2 days: ability to smell and taste begins to improve as damaged nerves start to regrow  2-3 weeks: circulation and lung function improves  1-9 months: decreased cough, congestion and shortness of breath; less tired  1 year: risk of heart attack decreases by half  5 years: risk of lung cancer decreases by half; risk of stroke becomes the same as a non-smoker  For information about how to quit smoking, visit the following links:  National Cancer [place] ,   Clearing the Air, Quit Smoking Today   - an online booklet. http://www.smokefree.gov/pubs/clearing_the_air.pdf  Smokefree.gov http://smokefree.gov/  QuitNet http://www.quitnet.com/    8584-7181 [employer], [address] [place] [address], [place] [address]. All rights reserved. This information is not intended as a substitute for professional medical care. Always follow your healthcare professional's instructions.    The Benefits of Living Smoke Free  What do you  want to gain from quitting? Check off some reasons to quit.  Health Benefits  ___ Reduce my risk of lung cancer, heart disease, chronic lung disease  ___ Have fewer wrinkles and softer skin  ___ Improve my sense of taste and smell  ___ For pregnant women--reduce the risk of having a miscarriage, stillbirth, premature birth, or low-birth-weight baby  Personal Benefits  ___ Feel more in control of my life  ___ Have better-smelling hair, breath, clothes, home, and car  ___ Save time by not having to take smoke breaks, buy cigarettes, or hunt for a light  ___ Have whiter teeth  Family Benefits  ___ Reduce my children s respiratory tract infections  ___ Set a good example for my children  ___ Reduce my family s cancer risk  Financial Benefits  ___ Save hundreds of dollars each year that would be spent on cigarettes  ___ Save money on medical bills  ___ Save on life, health, and car insurance premiums    Those Dollars Add Up!  Cigarettes are expensive, and getting more expensive all the time. Do you realize how much money you are spending on cigarettes per year? What is the average amount you spend on a pack of cigarettes? What is the average number of packs that you smoke per day? Using your answers to these questions, fill in this formula to help you find out:  ($ _____ per pack) ×  ( _____ number of packs per day) × (365 days) =  $ _____ yearly cost of smoking  Besides tobacco, there are other costs, including extra cleaning bills and replacement costs for clothing and furniture; medical expenses for smoking-related illnesses; and higher health, life, and car insurance premiums.    Cigars and Pipes Count Too!  Cigars and pipes are also dangerous. So are smokeless (chewing) tobacco and snuff. All of these products contain nicotine, a highly addictive substance that has harmful effects on your body. Quitting smoking means giving up all tobacco products.      1644-2389 [employer], [address] [facility], [place]  [address]. All rights reserved. This information is not intended as a substitute for professional medical care. Always follow your healthcare professional's instructions.          Follow-ups after your visit        Additional Services     QUITPLAN  Referral       [place] TO[employer]AC[employer] QUITLINES FAX FORM  Fax form to: [phone]    The clinic will facilitate the referral to the quitline.    Provider Information:  ===============================================================  Arden Oreilly MD  ID#: [id number] - [facility] [place] [phone] Fax: [phone]   [email or web address]  Payor: PREFERREDONE / Plan: PREFERREDONE [employer] ADMIN SERV CARE TEAM / Product Type: PPO /   ===============================================================    The Public Health Service Guideline does not recommend providing over-the-counter nicotine replacement therapy products without physician authorization to patients with the following conditions: pregnancy, uncontrolled high blood pressure, or cardiovascular diseases.     I authorize the [place] Tobacco Quitlines to provide over-the-counter nicotine replacement products for the patient listed below if the patient's health plan benefits cover NRT or if the patient is eligible for QUITPLAN services.    Patient Consented to:  ===============================================================  - YES - I am ready to quit tobacco and request the above information be given to the quitline so they may contact me.  I understand that one of [place]'s Tobacco Quitlines will inform my provider about my participation.  ===============================================================  Please check the BEST 3-hour call window for them to reach you: 5pm - 8pm  May we leave a message?  YES  Language Preference:  English  Phone Number: Home Phone      [phone]  Mobile          [phone]     E-mail Address:  [email or web address]    ========================================================================  FOR QUITLINE USE ONLY:  THIS INFORMATION WILL BE PROVIDED BACK TO THE PROVIDER  Contact date: __/ __/__ or ____ Did not reach after three attempts.    Outcome:  __ Enrolled in telephone counseling program  __ Declined  __ Not Reached    Stage of readiness: _______________________  Planned Quit Date: ___/ ___/ ___  Comments:      2011 [facility]   This message funded by [employer] and Blue [employer] [facility], an independent licensee of the Blue Cross and Blue Shield Association. Rev. 11/1/12                  Follow-up notes from your care team     Return if symptoms worsen or fail to improve.      Future tests that were ordered for you today     Open Future Orders        Priority Expected Expires Ordered    QUITPLAN  Referral Routine  10/23/2017 8/24/2017            Who to contact     If you have questions or need follow up information about today's clinic visit or your schedule please contact  ORTHOPEDICS directly at [phone].  Normal or non-critical lab and imaging results will be communicated to you by MyChart, letter or phone within 4 business days after the clinic has received the results. If you do not hear from us within 7 days, please contact the clinic through MyChart or phone. If you have a critical or abnormal lab result, we will notify you by phone as soon as possible.  Submit refill requests through [employer] or call your pharmacy and they will forward the refill request to us. Please allow 3 business days for your refill to be completed.          Additional Information About Your Visit        [employer] Information     [employer] gives you secure access to your electronic health record. If you see a primary care provider, you can also send messages to your care team and make appointments. If you have questions, please call your primary care clinic.  If you do not have a primary care  "provider, please call [phone] and they will assist you.        Care EveryWhere ID     This is your Care EveryWhere ID. This could be used by other organizations to access your [facility] medical records  [id number]        Your Vitals Were     Pulse Temperature Height Pulse Oximetry BMI (Body Mass Index)       69 97.9  F (36.6  C) (Tympanic) 5' 10.5\" (1.791 m) 98% 29.71 kg/m2        Blood Pressure from Last 3 Encounters:   08/24/17 110/58   05/03/17 118/60   04/25/17 132/72    Weight from Last 3 Encounters:   08/24/17 210 lb (95.3 kg)   05/03/17 210 lb (95.3 kg)   04/25/17 210 lb (95.3 kg)              We Performed the Following     Large Joint/Bursa injection and/or drainage (Shoulder, Knee)     Tobacco Cessation - for Health Maintenance          Today's Medication Changes          These changes are accurate as of: 8/24/17  2:59 PM.  If you have any questions, ask your nurse or doctor.               Start taking these medicines.        Dose/Directions    Hylan 48 MG/6ML Sosy injection   Commonly known as:  SYNVISC ONE   Used for:  Primary osteoarthritis of left knee   Started by:  Arden Oreilly MD        Dose:  16 mg   16 mg by INTRA-ARTICULAR route once for 1 dose   Quantity:  2 mL   Refills:  0            Where to get your medicines      Some of these will need a paper prescription and others can be bought over the counter.  Ask your nurse if you have questions.     You don't need a prescription for these medications     Hylan 48 MG/6ML Sosy injection                Primary Care Provider Office Phone # Fax #    Yosef Gonzales [phone]       [facility] [address]  [facility] [address]        Equal Access to Services     BILLY CHAO AH: Sara Duncan, waaxda luqadaha, qaybta kaalmada alyshayada, salina monsivais. So [facility] [phone].    ATENCIÓN: Si habla español, tiene a peters disposición servicios gratuitos de asistencia " lingüística. Elizabeth al [phone].    We comply with applicable federal civil rights laws and [place] laws. We do not discriminate on the basis of race, color, national origin, age, disability sex, sexual orientation or gender identity.            Thank you!     Thank you for choosing  ORTHOPEDICS  for your care. Our goal is always to provide you with excellent care. Hearing back from our patients is one way we can continue to improve our services. Please take a few minutes to complete the written survey that you may receive in the mail after your visit with us. Thank you!             Your Updated Medication List - Protect others around you: Learn how to safely use, store and throw away your medicines at www.disposemymeds.org.          This list is accurate as of: 8/24/17  2:59 PM.  Always use your most recent med list.                   Brand Name Dispense Instructions for use Diagnosis    Hylan 48 MG/6ML Sosy injection    SYNVISC ONE    2 mL    16 mg by INTRA-ARTICULAR route once for 1 dose    Primary osteoarthritis of left knee       ibuprofen 200 MG capsule      Take 600 mg by mouth every 6 hours as needed for fever        indomethacin 25 MG capsule    INDOCIN    42 capsule    Take 1 capsule (25 mg) by mouth 2 times daily (with meals)    Left sided chest pain

## 2017-08-24 NOTE — NURSING NOTE
"Chief Complaint   Patient presents with     Musculoskeletal Problem     Complaints of left knee pain.       Initial /58 (BP Location: Right arm, Patient Position: Sitting, Cuff Size: Adult Large)  Pulse 69  Temp 97.9  F (36.6  C) (Tympanic)  Ht 5' 10.5\" (1.791 m)  Wt 210 lb (95.3 kg)  SpO2 98%  BMI 29.71 kg/m2 Estimated body mass index is 29.71 kg/(m^2) as calculated from the following:    Height as of this encounter: 5' 10.5\" (1.791 m).    Weight as of this encounter: 210 lb (95.3 kg).  Medication Reconciliation: complete   Immunizations up to date.  Mica German LPN        "

## 2017-08-24 NOTE — PROGRESS NOTES
Chief complaint:  #1.  Primary osteoarthritis left knee  #2.  S/P APMM left knee April 9, 2015.   #3.  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  With the last few weeks knee pain has recurred medially and the Baker cyst has recurred.  She denies any recent injury to the knee.  She has been taking 600 mg of ibuprofen 2 or 3 times a day without symptom improvement.    In 2015 after her arthroscopy, she had recurrent bouts of left knee effusions and appearance of a Baker's cyst.  It seemed to recur despite multiple aspirations and intra-articular steroid injections and physical therapy until the fall of that year when the symptoms resolved.    Nothing is changed with respect to her musculoskeletal or neurologic review of systems since seen last.  She smokes and is interested in quitting.    Examination: Healthy-appearing female with appropriate mood and affect.  She is alert and oriented.  Her head is atraumatic.  Her neck is supple.  Her respiratory efforts are unlabored.  The skin around the left knee is intact and free of erythema.  The knee has no effusion or deformity.  There is a small Baker's cyst palpable in the posterior medial aspect of the knee but no enlarged lymph nodes posteriorly.  Left knee range of motion is 0-125 .  The knee is stable to ligamentous stressing.  Ewelina's maneuvers are negative.  Extension strength is normal.  The neurovascular status of the leg is intact.    X-ray; plain films of the right knee taken today were reviewed and compared with films taken on 9/8/15.  There's been no interval change.  There is moderate joint space narrowing medially with no significant osteophyte formation.    Impression:   #1.  Primary osteoarthritis left knee with recurrent Baker's cyst  #2.  Tobacco abuse    Plan: We discussed  treatment options including prescription NSAIDs, steroid injection, physical therapy, and Visco supplementation.  Her disease is not bad enough to require TKA.  Given that the symptoms are present at this time despite being on NSAIDs, and that steroid injections did not help her much the last time around, I recommend Visco supplementation.  We discussed the risks and benefits of that therapy.  She is given a brochure about Synvisc.  I answered all questions.  She consented to a Synvisc injection.      Procedure: After obtaining written informed consent the left knee was sterilely prepped.  A timeout was held.  The skin over that joint was anesthetized with topical ethyl chloride spray.  Sterile technique was employed as the subcutaneous tissues were injected with 1% Xylocaine without epinephrine.  That joint was injected with 6 mL of Synvisc 1 and 4 mg of dexamethasone through a superolateral approach.  The needle was removed and a Band-Aid was applied.  The patient tolerated the procedure well and there were no complications.  The dexamethasone was to prevent a Synvisc synovitis which can occur after 15-20% of Synvisc 1 injections.    She received counseling on the Quit  Smoking Plan.    She will return if the injection does not work, or in greater than 6 months when the benefits of the injection wear off.

## 2017-08-24 NOTE — PATIENT INSTRUCTIONS

## 2017-10-12 ENCOUNTER — MYC MEDICAL ADVICE (OUTPATIENT)
Dept: FAMILY MEDICINE | Facility: OTHER | Age: 62
End: 2017-10-12

## 2017-10-12 DIAGNOSIS — S90.852A FOREIGN BODY IN LEFT FOOT, INITIAL ENCOUNTER: Primary | ICD-10-CM

## 2017-11-26 ENCOUNTER — HEALTH MAINTENANCE LETTER (OUTPATIENT)
Age: 62
End: 2017-11-26

## 2018-01-24 ENCOUNTER — MYC MEDICAL ADVICE (OUTPATIENT)
Dept: FAMILY MEDICINE | Facility: OTHER | Age: 63
End: 2018-01-24

## 2018-01-25 ENCOUNTER — OFFICE VISIT (OUTPATIENT)
Dept: FAMILY MEDICINE | Facility: OTHER | Age: 63
End: 2018-01-25
Attending: FAMILY MEDICINE
Payer: COMMERCIAL

## 2018-01-25 VITALS
HEIGHT: 71 IN | DIASTOLIC BLOOD PRESSURE: 60 MMHG | SYSTOLIC BLOOD PRESSURE: 118 MMHG | WEIGHT: 218 LBS | BODY MASS INDEX: 30.52 KG/M2 | OXYGEN SATURATION: 96 % | HEART RATE: 73 BPM | TEMPERATURE: 97.4 F

## 2018-01-25 DIAGNOSIS — F43.0 ACUTE REACTION TO STRESS: Primary | ICD-10-CM

## 2018-01-25 DIAGNOSIS — Z71.6 TOBACCO ABUSE COUNSELING: ICD-10-CM

## 2018-01-25 DIAGNOSIS — Z72.0 TOBACCO ABUSE: ICD-10-CM

## 2018-01-25 DIAGNOSIS — Z11.59 NEED FOR HEPATITIS C SCREENING TEST: ICD-10-CM

## 2018-01-25 PROCEDURE — 99214 OFFICE O/P EST MOD 30 MIN: CPT | Performed by: FAMILY MEDICINE

## 2018-01-25 RX ORDER — VENLAFAXINE HYDROCHLORIDE 37.5 MG/1
CAPSULE, EXTENDED RELEASE ORAL
Qty: 46 CAPSULE | Refills: 0 | Status: SHIPPED | OUTPATIENT
Start: 2018-01-25 | End: 2018-02-22

## 2018-01-25 ASSESSMENT — ANXIETY QUESTIONNAIRES
GAD7 TOTAL SCORE: 6
3. WORRYING TOO MUCH ABOUT DIFFERENT THINGS: NOT AT ALL
IF YOU CHECKED OFF ANY PROBLEMS ON THIS QUESTIONNAIRE, HOW DIFFICULT HAVE THESE PROBLEMS MADE IT FOR YOU TO DO YOUR WORK, TAKE CARE OF THINGS AT HOME, OR GET ALONG WITH OTHER PEOPLE: SOMEWHAT DIFFICULT
5. BEING SO RESTLESS THAT IT IS HARD TO SIT STILL: NOT AT ALL
1. FEELING NERVOUS, ANXIOUS, OR ON EDGE: NOT AT ALL
6. BECOMING EASILY ANNOYED OR IRRITABLE: NEARLY EVERY DAY
2. NOT BEING ABLE TO STOP OR CONTROL WORRYING: SEVERAL DAYS
7. FEELING AFRAID AS IF SOMETHING AWFUL MIGHT HAPPEN: NOT AT ALL
4. TROUBLE RELAXING: MORE THAN HALF THE DAYS

## 2018-01-25 ASSESSMENT — PAIN SCALES - GENERAL: PAINLEVEL: NO PAIN (0)

## 2018-01-25 NOTE — PROGRESS NOTES
Shefali Dailey    January 25, 2018    Chief Complaint   Patient presents with     Bowel Problems     Pt states she is having 2-3 BM a day and some urgencyx 2 weeks.     Incontinence     Pt states that she is having trouble with urine incontinence x 1 year.     Smoking Cessation     Pt would like to discuss cessation.         SUBJECTIVE:  Here with some issues.  Having some bowel urgency.  Feels she has IBS due to stress.  Has had several deaths.  Reviewed this and urine.  She has both stress and urge incontinence at times, worse with the stress she is feeling.  We discussed many options today.  See below.      Past Medical History:   Diagnosis Date     Malignant neoplasm of breast (female), unspecified 11/28/2005       Past Surgical History:   Procedure Laterality Date     APPENDECTOMY  2004     ARTHROSCOPY KNEE Left 4/9/2015    Procedure: ARTHROSCOPY KNEE;  Surgeon: Arden Oreilly MD;  Location: [facility] OR     bilateral mastectomy      Bilateral, lymph nodes removed from left side     breast reconstruction  6/2006    Bilateral, expansion with saline implants     colonoscopy with biopsy  2012     DILATION AND CURETTAGE      D&C, multiple     EGD with biopsy  2012     needle loc breast biopsy  2005    LT     RAJESH/BSO  2004     TONSILLECTOMY  1961       Current Outpatient Prescriptions   Medication Sig Dispense Refill     venlafaxine (EFFEXOR-XR) 37.5 MG 24 hr capsule Take 1 capsule daily for 14 days, then take 2 capsules daily. 46 capsule 0     ibuprofen 200 MG capsule Take 600 mg by mouth every 6 hours as needed for fever         Allergies   Allergen Reactions     Benoxinate Hcl Other (See Comments), Itching and Swelling     Burning  Fluress       Fluorescein Sodium Other (See Comments), Itching and Swelling     Burning  Fluress     Fluorescein-Benoxinate Other (See Comments)       Family History   Problem Relation Age of Onset     DIABETES Mother      Myocardial Infarction Mother 72     myocardial infarction, cause  of death     Other - See Comments Father 63     liver cirrhosis     CEREBROVASCULAR DISEASE Father 64     cva, cause of death     Breast Cancer Daughter      Other - See Comments Sister      cholelithiasis     Other - See Comments Sister      DIABETES Sister      IDDM     Cancer - colorectal Sister      59     DIABETES Sister      diet controlled     Other - See Comments Sister      billary cirrhosis, stage 4       Social History     Social History     Marital status:      Spouse name: N/A     Number of children: N/A     Years of education: N/A     Occupational History     Not on file.     Social History Main Topics     Smoking status: Current Every Day Smoker     Packs/day: 0.50     Years: 34.00     Types: Cigarettes     Smokeless tobacco: Never Used      Comment: declines referral to [place] quitline     Alcohol use 0.0 oz/week     0 Standard drinks or equivalent per week      Comment: rarely     Drug use: No     Sexual activity: Not on file     Other Topics Concern     Blood Transfusions Yes     Caffeine Concern Yes     coffee 6 cups daily     Parent/Sibling W/ Cabg, Mi Or Angioplasty Before 65f 55m? No     Social History Narrative       5 point ROS negative except as noted above in HPI, including Gen., Resp., CV, GI &  system review.     OBJECTIVE:  B/P: 118/60, Temperature: 97.4, Pulse: 73, Respirations: Data Unavailable    GENERAL APPEARANCE: Alert, no acute distress  CV: regular rate and rhythm, no murmur, rub or gallop  RESP: lungs clear to auscultation bilaterally  ABDOMEN: normal bowel sounds, soft, nontender, no hepatosplenomegaly or other masses  SKIN: no suspicious lesions or rashes to visualized skin  NEURO: Alert, oriented x 3, speech and mentation normal    ASSESSMENT and PLAN:  (F43.0) Acute reaction to stress  (primary encounter diagnosis)  Comment: reviewed at length.   Plan: Lipid Profile (Chol, Trig, HDL, LDL calc),         Comprehensive metabolic panel (BMP + Alb, Alk         Phos, ALT,  AST, Total. Bili, TP), venlafaxine         (EFFEXOR-XR) 37.5 MG 24 hr capsule        Going to start effexor xr, which worked for her with breast ca dx.  F/u 1 month.  Discussed risk/benefit and side effects at length.      (Z11.59) Need for hepatitis C screening test  Comment: ordered  Plan: Hepatitis C Screen Reflex to HCV RNA Quant and         Genotype        As above.     25 minutes spent with patient over 50%in counseling regarding her symptoms, diffuse, her meds, tx options, and really the stressful events including her multiple family deaths.  We are going to treat the stress and address any ongoing symptoms.

## 2018-01-25 NOTE — PATIENT INSTRUCTIONS

## 2018-01-25 NOTE — NURSING NOTE
"Chief Complaint   Patient presents with     Bowel Problems     Pt states she is having 2-3 BM a day and some urgencyx 2 weeks.     Incontinence     Pt states that she is having trouble with urine incontinence x 1 year.     Smoking Cessation     Pt would like to discuss cessation.         Initial /60  Pulse 73  Temp 97.4  F (36.3  C)  Ht 5' 10.5\" (1.791 m)  Wt 218 lb (98.9 kg)  SpO2 96%  BMI 30.84 kg/m2 Estimated body mass index is 30.84 kg/(m^2) as calculated from the following:    Height as of this encounter: 5' 10.5\" (1.791 m).    Weight as of this encounter: 218 lb (98.9 kg).  Medication Reconciliation: complete   Raeann Gandhi    "

## 2018-01-25 NOTE — MR AVS SNAPSHOT
After Visit Summary   1/25/2018    Shefali Dailey    MRN: [id number]           Patient Information     Date Of Birth          1955        Visit Information        Provider Department      1/25/2018 4:15 PM Yosef Gonzales MD [facility]        Today's Diagnoses     Acute reaction to stress    -  1    Need for hepatitis C screening test        Tobacco abuse        Tobacco abuse counseling          Care Instructions      HOW TO QUIT SMOKING  Smoking is one of the hardest habits to break. About half of all those who have ever smoked have been able to quit, and most of those (about 70%) who still smoke want to quit. Here are some of the best ways to stop smoking.     KEEP TRYING:  It takes most smokers about 8 tries before they are finally able to fully quit. So, the more often you try and fail, the better your chance of quitting the next time! So, don't give up!    GO COLD TURKEY:  Most ex-smokers quit cold turkey. Trying to cut back gradually doesn't seem to work as well, perhaps because it continues the smoking habit. Also, it is possible to fool yourself by inhaling more while smoking fewer cigarettes. This results in the same amount of nicotine in your body!    GET SUPPORT:  Support programs can make an important difference, especially for the heavy smoker. These groups offer lectures, methods to change your behavior and peer support. Call the free national Quitline for more information. 800-QUIT-NOW (085-363-3185). Low-cost or free programs are offered by many hospitals, local chapters of the American Lung Association (585-979-0006) and the American Cancer Society (210-352-6979). Support at home is important too. Non-smokers can help by offering praise and encouragement. If the smoker fails to quit, encourage them to try again!    OVER-THE-COUNTER MEDICINES:  For those who can't quit on their own, Nicotine Replacement Therapy (NRT) may make quitting much easier. Certain aids such  as the nicotine patch, gum and lozenge are available without a prescription. However, it is best to use these under the guidance of your doctor. The skin patch provides a steady supply of nicotine to the body. Nicotine gum and lozenge gives temporary bursts of low levels of nicotine. Both methods take the edge off the craving for cigarettes. WARNING: If you feel symptoms of nicotine overdose, such as nausea, vomiting, dizziness, weakness, or fast heartbeat, stop using these and see your doctor.    PRESCRIPTION MEDICINES:  After evaluating your smoking patterns and prior attempts at quitting, your doctor may offer a prescription medicine such as bupropion (Zyban, Wellbutrin), varenicline (Chantix, Champix), a niocotine inhaler or nasal spray. Each has its unique advantage and side effects which your doctor can review with you.    HEALTH BENEFITS OF QUITTING:  The benefits of quitting start right away and keep improving the longer you go without smokin minutes: blood pressure and pulse return to normal  8 hours: oxygen levels return to normal  2 days: ability to smell and taste begins to improve as damaged nerves start to regrow  2-3 weeks: circulation and lung function improves  1-9 months: decreased cough, congestion and shortness of breath; less tired  1 year: risk of heart attack decreases by half  5 years: risk of lung cancer decreases by half; risk of stroke becomes the same as a non-smoker  For information about how to quit smoking, visit the following links:  National Cancer [employer] ,   Clearing the Air, Quit Smoking Today   - an online booklet. http://www.smokefree.gov/pubs/clearing_the_air.pdf  Smokefree.gov http://smokefree.gov/  QuitNet http://www.quitnet.com/    1983-5434 [place], [address] [place] [address], [place] [address]. All rights reserved. This information is not intended as a substitute for professional medical care. Always follow your healthcare professional's instructions.    The  Benefits of Living Smoke Free  What do you want to gain from quitting? Check off some reasons to quit.  Health Benefits  ___ Reduce my risk of lung cancer, heart disease, chronic lung disease  ___ Have fewer wrinkles and softer skin  ___ Improve my sense of taste and smell  ___ For pregnant women--reduce the risk of having a miscarriage, stillbirth, premature birth, or low-birth-weight baby  Personal Benefits  ___ Feel more in control of my life  ___ Have better-smelling hair, breath, clothes, home, and car  ___ Save time by not having to take smoke breaks, buy cigarettes, or hunt for a light  ___ Have whiter teeth  Family Benefits  ___ Reduce my children s respiratory tract infections  ___ Set a good example for my children  ___ Reduce my family s cancer risk  Financial Benefits  ___ Save hundreds of dollars each year that would be spent on cigarettes  ___ Save money on medical bills  ___ Save on life, health, and car insurance premiums    Those Dollars Add Up!  Cigarettes are expensive, and getting more expensive all the time. Do you realize how much money you are spending on cigarettes per year? What is the average amount you spend on a pack of cigarettes? What is the average number of packs that you smoke per day? Using your answers to these questions, fill in this formula to help you find out:  ($ _____ per pack) ×  ( _____ number of packs per day) × (365 days) =  $ _____ yearly cost of smoking  Besides tobacco, there are other costs, including extra cleaning bills and replacement costs for clothing and furniture; medical expenses for smoking-related illnesses; and higher health, life, and car insurance premiums.    Cigars and Pipes Count Too!  Cigars and pipes are also dangerous. So are smokeless (chewing) tobacco and snuff. All of these products contain nicotine, a highly addictive substance that has harmful effects on your body. Quitting smoking means giving up all tobacco products.      3211-[address] [place] [address]. All rights reserved. This information is not intended as a substitute for professional medical care. Always follow your healthcare professional's instructions.          Follow-ups after your visit        Additional Services     QUITPLAN  Referral       [place] [employer]AC[employer] QUITLINES FAX FORM  Fax form to: 1 [phone]    The clinic will facilitate the referral to the quitline.    Provider Information:  ===============================================================  Yosef Gonzales MD  ID#: [id number] - [facility] [place]  [place] [phone] Fax: [phone]   [email or web address]  Payor: PREFERREDONE / Plan: PREFERREDONE PEAK ADMIN SERV CARE TEAM / Product Type: PPO /   ===============================================================    The Public Health Service Guideline does not recommend providing over-the-counter nicotine replacement therapy products without physician authorization to patients with the following conditions: pregnancy, uncontrolled high blood pressure, or cardiovascular diseases.     I authorize the [place] Tobacco Quitlines to provide over-the-counter nicotine replacement products for the patient listed below if the patient's health plan benefits cover NRT or if the patient is eligible for QUITPLAN services.    Patient Consented to:  ===============================================================  - YES - I am ready to quit tobacco and request the above information be given to the quitline so they may contact me.  I understand that one of [place]'s Tobacco Quitlines will inform my provider about my participation.  ===============================================================  Please check the BEST 3-hour call window for them to reach you: 5pm - 8pm  May we leave a message?  YES  Language Preference:  English  Phone Number: Home Phone      [phone]  [place]          [phone]      E-mail Address: [email or web address]@"Shahab [email or web address], Broker"    ========================================================================  FOR QUITLINE USE ONLY:  THIS INFORMATION WILL BE PROVIDED BACK TO THE PROVIDER  Contact date: __/ __/__ or ____ Did not reach after three attempts.    Outcome:  __ Enrolled in telephone counseling program  __ Declined  __ Not Reached    Stage of readiness: _______________________  Planned Quit Date: ___/ ___/ ___  Comments:      [address] [facility]   This message funded by [employer] [place], an independent licensee of the Blue Cross and Blue Shield Association. Rev. 11/1/12                  Your next 10 appointments already scheduled     Feb 23, 2018  4:00 PM CST   (Arrive by 3:45 PM)   SHORT with Yosef Gonzales MD   [facility] ([facility] )    [address] [facility] [address]   [phone]              Who to contact     If you have questions or need follow up information about today's clinic visit or your schedule please contact [facility] directly at [phone].  Normal or non-critical lab and imaging results will be communicated to you by MyChart, letter or phone within 4 business days after the clinic has received the results. If you do not hear from us within 7 days, please contact the clinic through [employer] or phone. If you have a critical or abnormal lab result, we will notify you by phone as soon as possible.  Submit refill requests through [facility] or call your pharmacy and they will forward the refill request to us. Please allow 3 business days for your refill to be completed.          Additional Information About Your Visit        [employer] Information     [facility] gives you secure access to your electronic health record. If you see a primary care provider, you can also send messages to your care team and make appointments. If you have questions, please call your primary care clinic.  If you do  "not have a primary care provider, please call [phone] and they will assist you.        Care EveryWhere ID     This is your Care EveryWhere ID. This could be used by other organizations to access your [place] medical records  [id number]        Your Vitals Were     Pulse Temperature Height Pulse Oximetry BMI (Body Mass Index)       73 97.4  F (36.3  C) 5' 10.5\" (1.791 m) 96% 30.84 kg/m2        Blood Pressure from Last 3 Encounters:   01/25/18 118/60   08/24/17 110/58   05/03/17 118/60    Weight from Last 3 Encounters:   01/25/18 218 lb (98.9 kg)   08/24/17 210 lb (95.3 kg)   05/03/17 210 lb (95.3 kg)              We Performed the Following     Tobacco Cessation - Order to Satisfy Health Maintenance          Today's Medication Changes          These changes are accurate as of 1/25/18 11:59 PM.  If you have any questions, ask your nurse or doctor.               Start taking these medicines.        Dose/Directions    venlafaxine 37.5 MG 24 hr capsule   Commonly known as:  EFFEXOR-XR   Used for:  Acute reaction to stress   Started by:  Yosef Gonzales MD        Take 1 capsule daily for 14 days, then take 2 capsules daily.   Quantity:  46 capsule   Refills:  0         Stop taking these medicines if you haven't already. Please contact your care team if you have questions.     indomethacin 25 MG capsule   Commonly known as:  INDOCIN   Stopped by:  Yosef Gonzales MD                Where to get your medicines      These medications were sent to [facility] [place] - [address]  [address] [place] [address]     Phone:  [phone]     venlafaxine 37.5 MG 24 hr capsule                Primary Care Provider Office Phone # Fax #    Yosef Gonzales [phone]       [address] [place] [facility] [address]        Equal Access to Services     YOUSUF CHAO AH: Hadii aad ku hadasho Soomaali, waaxda luqadaha, qaybta kaalmada adeegyada, waxay aneesh sosa " alysha elizabethilianauma serratoJohnaamalini ah. So [facility] [phone].    ATENCIÓN: Si habla leila, tiene a peters disposición servicios gratuitos de asistencia lingüística. Elizabeth al [phone].    We comply with applicable federal civil rights laws and [place] laws. We do not discriminate on the basis of race, color, national origin, age, disability, sex, sexual orientation, or gender identity.            Thank you!     Thank you for choosing [facility]  for your care. Our goal is always to provide you with excellent care. Hearing back from our patients is one way we can continue to improve our services. Please take a few minutes to complete the written survey that you may receive in the mail after your visit with us. Thank you!             Your Updated Medication List - Protect others around you: Learn how to safely use, store and throw away your medicines at www.disposemymeds.org.          This list is accurate as of 1/25/18 11:59 PM.  Always use your most recent med list.                   Brand Name Dispense Instructions for use Diagnosis    ibuprofen 200 MG capsule      Take 600 mg by mouth every 6 hours as needed for fever        venlafaxine 37.5 MG 24 hr capsule    EFFEXOR-XR    46 capsule    Take 1 capsule daily for 14 days, then take 2 capsules daily.    Acute reaction to stress

## 2018-01-26 ASSESSMENT — ANXIETY QUESTIONNAIRES: GAD7 TOTAL SCORE: 6

## 2018-01-26 ASSESSMENT — PATIENT HEALTH QUESTIONNAIRE - PHQ9: SUM OF ALL RESPONSES TO PHQ QUESTIONS 1-9: 14

## 2018-02-22 DIAGNOSIS — F43.0 ACUTE REACTION TO STRESS: ICD-10-CM

## 2018-02-23 RX ORDER — VENLAFAXINE HYDROCHLORIDE 75 MG/1
75 CAPSULE, EXTENDED RELEASE ORAL DAILY
Qty: 30 CAPSULE | Refills: 0 | Status: SHIPPED | OUTPATIENT
Start: 2018-02-23 | End: 2018-02-28

## 2018-02-23 NOTE — TELEPHONE ENCOUNTER
Effexor - patient has future appointment.  Was started on 37.5 mg capsules for 2 weeks.  Pharmacy requesting 75 mg capsules now that patient has been taking 2.    Last visit: 1.25.18  Last refill: 1.25.18    Next 5 appointments (look out 90 days)     Feb 28, 2018  4:00 PM CST   (Arrive by 3:45 PM)   SHORT with Yosef Gonzales MD   [facility] )    [address] [facility] [address]   [phone]                PHQ-9 score:    PHQ-9 SCORE 1/25/2018   Total Score 14

## 2018-02-28 ENCOUNTER — OFFICE VISIT (OUTPATIENT)
Dept: FAMILY MEDICINE | Facility: OTHER | Age: 63
End: 2018-02-28
Attending: FAMILY MEDICINE
Payer: COMMERCIAL

## 2018-02-28 VITALS
SYSTOLIC BLOOD PRESSURE: 116 MMHG | WEIGHT: 217.25 LBS | TEMPERATURE: 98.3 F | BODY MASS INDEX: 30.73 KG/M2 | HEART RATE: 65 BPM | DIASTOLIC BLOOD PRESSURE: 58 MMHG | OXYGEN SATURATION: 94 %

## 2018-02-28 DIAGNOSIS — F41.9 ANXIETY: ICD-10-CM

## 2018-02-28 DIAGNOSIS — N39.3 FEMALE STRESS INCONTINENCE: ICD-10-CM

## 2018-02-28 DIAGNOSIS — F43.0 ACUTE REACTION TO STRESS: Primary | ICD-10-CM

## 2018-02-28 LAB
ALBUMIN UR-MCNC: NEGATIVE MG/DL
APPEARANCE UR: CLEAR
BILIRUB UR QL STRIP: NEGATIVE
COLOR UR AUTO: YELLOW
GLUCOSE UR STRIP-MCNC: NEGATIVE MG/DL
HGB UR QL STRIP: NEGATIVE
KETONES UR STRIP-MCNC: NEGATIVE MG/DL
LEUKOCYTE ESTERASE UR QL STRIP: NEGATIVE
NITRATE UR QL: NEGATIVE
PH UR STRIP: 5.5 PH (ref 5–7)
SOURCE: NORMAL
SP GR UR STRIP: 1.02 (ref 1–1.03)
UROBILINOGEN UR STRIP-ACNC: 0.2 EU/DL (ref 0.2–1)

## 2018-02-28 PROCEDURE — 99214 OFFICE O/P EST MOD 30 MIN: CPT | Performed by: FAMILY MEDICINE

## 2018-02-28 PROCEDURE — 81003 URINALYSIS AUTO W/O SCOPE: CPT | Performed by: FAMILY MEDICINE

## 2018-02-28 RX ORDER — VENLAFAXINE HYDROCHLORIDE 75 MG/1
75 CAPSULE, EXTENDED RELEASE ORAL DAILY
Qty: 90 CAPSULE | Refills: 3 | Status: SHIPPED | OUTPATIENT
Start: 2018-02-28 | End: 2019-03-13

## 2018-02-28 ASSESSMENT — ANXIETY QUESTIONNAIRES
6. BECOMING EASILY ANNOYED OR IRRITABLE: NOT AT ALL
4. TROUBLE RELAXING: NOT AT ALL
1. FEELING NERVOUS, ANXIOUS, OR ON EDGE: NOT AT ALL
3. WORRYING TOO MUCH ABOUT DIFFERENT THINGS: NOT AT ALL
2. NOT BEING ABLE TO STOP OR CONTROL WORRYING: NOT AT ALL
GAD7 TOTAL SCORE: 0
5. BEING SO RESTLESS THAT IT IS HARD TO SIT STILL: NOT AT ALL
7. FEELING AFRAID AS IF SOMETHING AWFUL MIGHT HAPPEN: NOT AT ALL

## 2018-02-28 ASSESSMENT — PAIN SCALES - GENERAL: PAINLEVEL: MODERATE PAIN (5)

## 2018-02-28 NOTE — MR AVS SNAPSHOT
After Visit Summary   2/28/2018    Shefali Dailey    MRN: [id number]           Patient Information     Date Of Birth          1955        Visit Information        Provider Department      2/28/2018 4:00 PM Yosef Gonzales MD [facility]        Today's Diagnoses     Acute reaction to stress    -  1    Anxiety        Female stress incontinence          Care Instructions    F/u with ongoing concerns.           Follow-ups after your visit        Who to contact     If you have questions or need follow up information about today's clinic visit or your schedule please contact [facility] directly at [phone].  Normal or non-critical lab and imaging results will be communicated to you by MyChart, letter or phone within 4 business days after the clinic has received the results. If you do not hear from us within 7 days, please contact the clinic through [employer] or phone. If you have a critical or abnormal lab result, we will notify you by phone as soon as possible.  Submit refill requests through [employer] or call your pharmacy and they will forward the refill request to us. Please allow 3 business days for your refill to be completed.          Additional Information About Your Visit        MyChart Information     [employer] gives you secure access to your electronic health record. If you see a primary care provider, you can also send messages to your care team and make appointments. If you have questions, please call your primary care clinic.  If you do not have a primary care provider, please call [phone] and they will assist you.        Care EveryWhere ID     This is your Care EveryWhere ID. This could be used by other organizations to access your [place] medical records  [id number]        Your Vitals Were     Pulse Temperature Pulse Oximetry BMI (Body Mass Index)          65 98.3  F (36.8  C) (Tympanic) 94% 30.73 kg/m2         Blood Pressure from Last 3 Encounters:    02/28/18 116/58   01/25/18 118/60   08/24/17 110/58    Weight from Last 3 Encounters:   02/28/18 217 lb 4 oz (98.5 kg)   01/25/18 218 lb (98.9 kg)   08/24/17 210 lb (95.3 kg)              We Performed the Following     *UA reflex to Microscopic and Culture - [facility]          Where to get your medicines      These medications were sent to [facility] [place] - [address]  [address] [place] [address]     Phone:  [phone]     venlafaxine 75 MG 24 hr capsule          Primary Care Provider Office Phone # Fax #    Yosef Gonzales [phone]       [address] [place] [address] [address]  [facility] [address]        Equal Access to Services     BILLY CHAO : Hadii kayla pascual hadasho Soomaali, waaxda luqadaha, qaybta kaalmada adeegyada, salina mirza . So [facility] [place] [phone].    ATENCIÓN: Si habla español, tiene a peters disposición servicios gratuitos de asistencia lingüística. [facility] [phone].    We comply with applicable federal civil rights laws and [place] laws. We do not discriminate on the basis of race, color, national origin, age, disability, sex, sexual orientation, or gender identity.            Thank you!     Thank you for choosing [facility]  for your care. Our goal is always to provide you with excellent care. Hearing back from our patients is one way we can continue to improve our services. Please take a few minutes to complete the written survey that you may receive in the mail after your visit with us. Thank you!             Your Updated Medication List - Protect others around you: Learn how to safely use, store and throw away your medicines at www.disposemymeds.org.          This list is accurate as of 2/28/18  5:09 PM.  Always use your most recent med list.                   Brand Name Dispense Instructions for use Diagnosis    venlafaxine 75 MG 24 hr capsule    EFFEXOR-XR    90 capsule    Take 1 capsule (75 mg) by mouth  daily    Acute reaction to stress

## 2018-02-28 NOTE — NURSING NOTE
"Chief Complaint   Patient presents with     Bladder Problems     still the same. but now have left flank pains. constant pain since friday. pain is 6      Bowel Problems     not going a frequent as before. goes from cramping and diarrhea to normal stools     Anxiety     feels much better.       Initial /58  Pulse 65  Temp 98.3  F (36.8  C) (Tympanic)  Wt 217 lb 4 oz (98.5 kg)  SpO2 94%  BMI 30.73 kg/m2 Estimated body mass index is 30.73 kg/(m^2) as calculated from the following:    Height as of 1/25/18: 5' 10.5\" (1.791 m).    Weight as of this encounter: 217 lb 4 oz (98.5 kg).  Medication Reconciliation: complete     Justa Jara    "

## 2018-02-28 NOTE — PROGRESS NOTES
Shefali Dailey    February 28, 2018    Chief Complaint   Patient presents with     Bladder Problems     still the same. but now have left flank pains. constant pain since friday. pain is 6      Bowel Problems     not going a frequent as before. goes from cramping and diarrhea to normal stools     Anxiety     feels much better.       SUBJECTIVE:  Here for f/u.  Doing way better.  Stools a bit better.  Bladder same and having some LBP wants to make sure not the kidneys.  Really quite pleased.  Mood is improved.  Family notices.  The issue is she is getting itching in the hands and feet and wondering if the effexor might be causing.  We discussed.  No other sx of allergy.     Past Medical History:   Diagnosis Date     Malignant neoplasm of breast (female), unspecified 11/28/2005       Past Surgical History:   Procedure Laterality Date     APPENDECTOMY  2004     ARTHROSCOPY KNEE Left 4/9/2015    Procedure: ARTHROSCOPY KNEE;  Surgeon: Arden Oreilly MD;  Location: [facility] OR     bilateral mastectomy      Bilateral, lymph nodes removed from left side     breast reconstruction  6/2006    Bilateral, expansion with saline implants     colonoscopy with biopsy  2012     DILATION AND CURETTAGE      D&C, multiple     EGD with biopsy  2012     needle loc breast biopsy  2005    LT     RAJESH/BSO  2004     TONSILLECTOMY  1961       Current Outpatient Prescriptions   Medication Sig Dispense Refill     venlafaxine (EFFEXOR-XR) 75 MG 24 hr capsule Take 1 capsule (75 mg) by mouth daily 90 capsule 3     [DISCONTINUED] venlafaxine (EFFEXOR-XR) 75 MG 24 hr capsule Take 1 capsule (75 mg) by mouth daily 30 capsule 0       Allergies   Allergen Reactions     Benoxinate Hcl Other (See Comments), Itching and Swelling     Burning  Fluress       Fluorescein Sodium Other (See Comments), Itching and Swelling     Burning  Fluress     Fluorescein-Benoxinate Other (See Comments)       Family History   Problem Relation Age of Onset     DIABETES Mother       Myocardial Infarction Mother 72     myocardial infarction, cause of death     Other - See Comments Father 63     liver cirrhosis     CEREBROVASCULAR DISEASE Father 64     cva, cause of death     Breast Cancer Daughter      Other - See Comments Sister      cholelithiasis     Other - See Comments Sister      DIABETES Sister      IDDM     Cancer - colorectal Sister      59     DIABETES Sister      diet controlled     Other - See Comments Sister      billary cirrhosis, stage 4       Social History     Social History     Marital status:      Spouse name: N/A     Number of children: N/A     Years of education: N/A     Occupational History     Not on file.     Social History Main Topics     Smoking status: Current Every Day Smoker     Packs/day: 0.50     Years: 34.00     Types: Cigarettes     Smokeless tobacco: Never Used      Comment: declines referral to [place] quitline     Alcohol use 0.0 oz/week     0 Standard drinks or equivalent per week      Comment: rarely     Drug use: No     Sexual activity: Not on file     Other Topics Concern     Blood Transfusions Yes     Caffeine Concern Yes     coffee 6 cups daily     Parent/Sibling W/ Cabg, Mi Or Angioplasty Before 65f 55m? No     Social History Narrative       5 point ROS negative except as noted above in HPI, including Gen., Resp., CV, GI &  system review.     OBJECTIVE:  B/P: 116/58, Temperature: 98.3, Pulse: 65, Respirations: Data Unavailable    GENERAL APPEARANCE: Alert, no acute distress  CV: regular rate and rhythm, no murmur, rub or gallop  RESP: lungs clear to auscultation bilaterally  ABDOMEN: normal bowel sounds, soft, nontender, no hepatosplenomegaly or other masses  Ext:  No rash.  Some erythema diffusely in the neck area.    SKIN: no suspicious lesions or rashes to visualized skin  NEURO: Alert, oriented x 3, speech and mentation normal    ASSESSMENT and PLAN:  (F43.0) Acute reaction to stress  (primary encounter diagnosis)  Comment: improving.    Plan: venlafaxine (EFFEXOR-XR) 75 MG 24 hr capsule        Continue effexor.  Question a bit of allergy, but for now continue and watch.  She is watching closely for worsening.  She tolerated in remote past without issue.     (F41.9) Anxiety  Comment: improved.   Plan: no change.  The bowels and bladder are a bit better and we are going to see if these keep getting better.     (N39.3) Female stress incontinence  Comment: with ongoing loose stools, improved.   Plan: *UA reflex to Microscopic and Culture - [place]         [place]        UA negative.  Consider detrol or other.  She understands.

## 2018-03-01 ASSESSMENT — PATIENT HEALTH QUESTIONNAIRE - PHQ9: SUM OF ALL RESPONSES TO PHQ QUESTIONS 1-9: 2

## 2018-03-01 ASSESSMENT — ANXIETY QUESTIONNAIRES: GAD7 TOTAL SCORE: 0

## 2018-07-05 ENCOUNTER — OFFICE VISIT (OUTPATIENT)
Dept: ORTHOPEDICS | Facility: OTHER | Age: 63
End: 2018-07-05
Attending: ORTHOPAEDIC SURGERY
Payer: COMMERCIAL

## 2018-07-05 VITALS
OXYGEN SATURATION: 95 % | TEMPERATURE: 98.3 F | DIASTOLIC BLOOD PRESSURE: 60 MMHG | SYSTOLIC BLOOD PRESSURE: 104 MMHG | HEIGHT: 71 IN | WEIGHT: 220 LBS | BODY MASS INDEX: 30.8 KG/M2 | HEART RATE: 69 BPM

## 2018-07-05 DIAGNOSIS — M17.12 PRIMARY OSTEOARTHRITIS OF LEFT KNEE: Primary | ICD-10-CM

## 2018-07-05 DIAGNOSIS — Z72.0 TOBACCO ABUSE: ICD-10-CM

## 2018-07-05 DIAGNOSIS — M17.10 PRIMARY LOCALIZED OSTEOARTHROSIS, LOWER LEG: ICD-10-CM

## 2018-07-05 DIAGNOSIS — M25.569 PAIN IN JOINT, LOWER LEG: ICD-10-CM

## 2018-07-05 PROCEDURE — 20610 DRAIN/INJ JOINT/BURSA W/O US: CPT | Mod: LT | Performed by: ORTHOPAEDIC SURGERY

## 2018-07-05 RX ORDER — DEXAMETHASONE SODIUM PHOSPHATE 4 MG/ML
4 INJECTION, SOLUTION INTRA-ARTICULAR; INTRALESIONAL; INTRAMUSCULAR; INTRAVENOUS; SOFT TISSUE ONCE
Qty: 1 ML | Refills: 0 | OUTPATIENT
Start: 2018-07-05 | End: 2018-10-31

## 2018-07-05 ASSESSMENT — PAIN SCALES - GENERAL: PAINLEVEL: MODERATE PAIN (4)

## 2018-07-05 NOTE — PROGRESS NOTES
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 Gonzales    HPI: She is a long history of DJD of the left knee.  She is status post APMM on 4/9/2015.  This knee has also bothered her with a Baker's cyst that comes and goes.  She has not had good results with physical therapy, steroid injections, and NSAIDs.  She had an excellent outcome with a Synvisc injection on 8/24/2017.  The benefits have worn off and that intermittent pain and effusions have recurred.  She requests another injection.  She denies recent injury to the left knee.      She continues to smoke and is not interested in quitting at this time.    Examination:/60  Pulse 69  Temp 98.3  F (36.8  C) (Tympanic)  Ht 5' 10.5\" (1.791 m)  Wt 220 lb (99.8 kg)  SpO2 95%  BMI 31.12 kg/m2  Healthy-appearing female with appropriate mood and affect.  The left knee has no effusion or deformity.    X-rays: The last films on this knee were performed on 8/24/2017 and showed moderate degenerative changes.    Impression:   #1.  Primary osteoarthritis left knee  #2.  Tobacco abuse    Plan: We will inject the left knee with her second Visco supplementation injection.  She will return as needed.    Procedure: After obtaining written informed consent the left knee was sterilely prepped.  A timeout was held.  The skin over that joint was anesthetized with topical ethyl chloride spray.  Sterile technique was employed as the subcutaneous tissues were injected with 1% Xylocaine without epinephrine.  That joint was injected with 6 mL of Synvisc 1 and 4 mg of dexamethasone through a superolateral approach.  The needle was removed and a Band-Aid was applied.  The patient tolerated the procedure well and there were no complications.  The dexamethasone was to prevent a Synvisc synovitis which can occur after 15-20% of Synvisc 1 injections.        "

## 2018-07-05 NOTE — NURSING NOTE
"Chief Complaint   Patient presents with     Musculoskeletal Problem     left knee pain last synvisc inject 8/24/2017.       Initial /60  Pulse 69  Temp 98.3  F (36.8  C) (Tympanic)  Ht 5' 10.5\" (1.791 m)  Wt 220 lb (99.8 kg)  SpO2 95%  BMI 31.12 kg/m2 Estimated body mass index is 31.12 kg/(m^2) as calculated from the following:    Height as of this encounter: 5' 10.5\" (1.791 m).    Weight as of this encounter: 220 lb (99.8 kg).  Medication Reconciliation: complete    Keyanna Villavicencio LPN    "

## 2018-07-05 NOTE — MR AVS SNAPSHOT
After Visit Summary   7/5/2018    Shefali Dailey    MRN: [id number]           Patient Information     Date Of Birth          1955        Visit Information        Provider Department      7/5/2018 1:40 PM Arden Oreilly MD  [facility]        Today's Diagnoses     Primary osteoarthritis of left knee    -  1    Primary localized osteoarthrosis, lower leg        JOINT PAIN KNEE        Tobacco abuse           Follow-ups after your visit        Follow-up notes from your care team     Return if symptoms worsen or fail to improve.      Who to contact     If you have questions or need follow up information about today's clinic visit or your schedule please contact  [facility] directly at [phone].  Normal or non-critical lab and imaging results will be communicated to you by MyChart, letter or phone within 4 business days after the clinic has received the results. If you do not hear from us within 7 days, please contact the clinic through [facility] or phone. If you have a critical or abnormal lab result, we will notify you by phone as soon as possible.  Submit refill requests through [employer] or call your pharmacy and they will forward the refill request to us. Please allow 3 business days for your refill to be completed.          Additional Information About Your Visit        MyChart Information     [facility] gives you secure access to your electronic health record. If you see a primary care provider, you can also send messages to your care team and make appointments. If you have questions, please call your primary care clinic.  If you do not have a primary care provider, please call [phone] and they will assist you.        Care EveryWhere ID     This is your Care EveryWhere ID. This could be used by other organizations to access your [place] medical records  [id number]        Your Vitals Were     Pulse Temperature Height Pulse Oximetry BMI (Body Mass Index)       69 98.3  F (36.8  C)  "(Tympanic) 5' 10.5\" (1.791 m) 95% 31.12 kg/m2        Blood Pressure from Last 3 Encounters:   07/05/18 104/60   02/28/18 116/58   01/25/18 118/60    Weight from Last 3 Encounters:   07/05/18 220 lb (99.8 kg)   02/28/18 217 lb 4 oz (98.5 kg)   01/25/18 218 lb (98.9 kg)              We Performed the Following     C Synvisc per 1 MG  []     DEXAMETHASONE SODIUM PHOS PER 1 MG     Large Joint/Bursa injection and/or drainage (Shoulder, Knee)          Today's Medication Changes          These changes are accurate as of 7/5/18  2:53 PM.  If you have any questions, ask your nurse or doctor.               Start taking these medicines.        Dose/Directions    dexamethasone 4 MG/ML injection   Commonly known as:  DECADRON   Used for:  Primary osteoarthritis of left knee   Started by:  Arden Oreilly MD        Dose:  4 mg   Inject 1 mL (4 mg) as directed once for 1 dose Use 4 mg or dose determined by provider for iontophoresis.   Quantity:  1 mL   Refills:  0       hylan 48 MG/6ML injection   Commonly known as:  SYNVISC ONE   Used for:  Primary osteoarthritis of left knee   Started by:  Arden Oreilly MD        Dose:  16 mg   2 mLs (16 mg) by INTRA-ARTICULAR route once for 1 dose   Quantity:  6 mL   Refills:  0            Where to get your medicines      Some of these will need a paper prescription and others can be bought over the counter.  Ask your nurse if you have questions.     You don't need a prescription for these medications     dexamethasone 4 MG/ML injection    hylan 48 MG/6ML injection                Primary Care Provider Office Phone # Fax #    Yosef Gonzales [phone]       [address] [place] [address] E  [facility] [address]        Equal Access to Services     YOUSUF CHAO AH: Sara Duncan, markel lozada, salina pichardo. So [facility] [phone].    ATENCIÓN: Si habla español, tiene a peters disposición servicios gratuitos de " evangelistaa lingüística. Elizabeth al [phone].    We comply with applicable federal civil rights laws and [place] laws. We do not discriminate on the basis of race, color, national origin, age, disability, sex, sexual orientation, or gender identity.            Thank you!     Thank you for choosing  [facility]  for your care. Our goal is always to provide you with excellent care. Hearing back from our patients is one way we can continue to improve our services. Please take a few minutes to complete the written survey that you may receive in the mail after your visit with us. Thank you!             Your Updated Medication List - Protect others around you: Learn how to safely use, store and throw away your medicines at www.disposemymeds.org.          This list is accurate as of 7/5/18  2:53 PM.  Always use your most recent med list.                   Brand Name Dispense Instructions for use Diagnosis    dexamethasone 4 MG/ML injection    DECADRON    1 mL    Inject 1 mL (4 mg) as directed once for 1 dose Use 4 mg or dose determined by provider for iontophoresis.    Primary osteoarthritis of left knee       hylan 48 MG/6ML injection    SYNVISC ONE    6 mL    2 mLs (16 mg) by INTRA-ARTICULAR route once for 1 dose    Primary osteoarthritis of left knee       venlafaxine 75 MG 24 hr capsule    EFFEXOR-XR    90 capsule    Take 1 capsule (75 mg) by mouth daily    Acute reaction to stress

## 2018-07-29 ENCOUNTER — HOSPITAL ENCOUNTER (EMERGENCY)
Facility: HOSPITAL | Age: 63
Discharge: HOME | End: 2018-07-29
Attending: PHYSICIAN ASSISTANT | Admitting: PHYSICIAN ASSISTANT
Payer: COMMERCIAL

## 2018-07-29 ENCOUNTER — APPOINTMENT (OUTPATIENT)
Dept: CT IMAGING | Facility: HOSPITAL | Age: 63
End: 2018-07-29
Attending: PHYSICIAN ASSISTANT
Payer: COMMERCIAL

## 2018-07-29 VITALS
HEART RATE: 54 BPM | SYSTOLIC BLOOD PRESSURE: 134 MMHG | RESPIRATION RATE: 16 BRPM | DIASTOLIC BLOOD PRESSURE: 84 MMHG | OXYGEN SATURATION: 98 % | TEMPERATURE: 98 F

## 2018-07-29 DIAGNOSIS — R10.9 FLANK PAIN: ICD-10-CM

## 2018-07-29 LAB
ALBUMIN SERPL-MCNC: 4.1 G/DL (ref 3.4–5)
ALBUMIN UR-MCNC: NEGATIVE MG/DL
ALP SERPL-CCNC: 88 U/L (ref 40–150)
ALT SERPL W P-5'-P-CCNC: 23 U/L (ref 0–50)
ANION GAP SERPL CALCULATED.3IONS-SCNC: 6 MMOL/L (ref 3–14)
APPEARANCE UR: CLEAR
AST SERPL W P-5'-P-CCNC: 13 U/L (ref 0–45)
BACTERIA #/AREA URNS HPF: ABNORMAL /HPF
BASOPHILS # BLD AUTO: 0 10E9/L (ref 0–0.2)
BASOPHILS NFR BLD AUTO: 0.4 %
BILIRUB SERPL-MCNC: 0.4 MG/DL (ref 0.2–1.3)
BILIRUB UR QL STRIP: NEGATIVE
BUN SERPL-MCNC: 17 MG/DL (ref 7–30)
CALCIUM SERPL-MCNC: 8.6 MG/DL (ref 8.5–10.1)
CHLORIDE SERPL-SCNC: 107 MMOL/L (ref 94–109)
CO2 SERPL-SCNC: 27 MMOL/L (ref 20–32)
COLOR UR AUTO: ABNORMAL
CREAT SERPL-MCNC: 0.6 MG/DL (ref 0.52–1.04)
DIFFERENTIAL METHOD BLD: NORMAL
EOSINOPHIL # BLD AUTO: 0.1 10E9/L (ref 0–0.7)
EOSINOPHIL NFR BLD AUTO: 1.5 %
ERYTHROCYTE [DISTWIDTH] IN BLOOD BY AUTOMATED COUNT: 13.2 % (ref 10–15)
GFR SERPL CREATININE-BSD FRML MDRD: >90 ML/MIN/1.7M2
GLUCOSE SERPL-MCNC: 113 MG/DL (ref 70–99)
GLUCOSE UR STRIP-MCNC: NEGATIVE MG/DL
HCT VFR BLD AUTO: 44.5 % (ref 35–47)
HGB BLD-MCNC: 15.2 G/DL (ref 11.7–15.7)
HGB UR QL STRIP: NEGATIVE
IMM GRANULOCYTES # BLD: 0 10E9/L (ref 0–0.4)
IMM GRANULOCYTES NFR BLD: 0.3 %
KETONES UR STRIP-MCNC: NEGATIVE MG/DL
LEUKOCYTE ESTERASE UR QL STRIP: NEGATIVE
LYMPHOCYTES # BLD AUTO: 1.7 10E9/L (ref 0.8–5.3)
LYMPHOCYTES NFR BLD AUTO: 22.3 %
MCH RBC QN AUTO: 30.6 PG (ref 26.5–33)
MCHC RBC AUTO-ENTMCNC: 34.2 G/DL (ref 31.5–36.5)
MCV RBC AUTO: 90 FL (ref 78–100)
MONOCYTES # BLD AUTO: 0.5 10E9/L (ref 0–1.3)
MONOCYTES NFR BLD AUTO: 6.1 %
MUCOUS THREADS #/AREA URNS LPF: PRESENT /LPF
NEUTROPHILS # BLD AUTO: 5.2 10E9/L (ref 1.6–8.3)
NEUTROPHILS NFR BLD AUTO: 69.4 %
NITRATE UR QL: NEGATIVE
NRBC # BLD AUTO: 0 10*3/UL
NRBC BLD AUTO-RTO: 0 /100
PH UR STRIP: 7.5 PH (ref 4.7–8)
PLATELET # BLD AUTO: 232 10E9/L (ref 150–450)
POTASSIUM SERPL-SCNC: 4.2 MMOL/L (ref 3.4–5.3)
PROT SERPL-MCNC: 7.7 G/DL (ref 6.8–8.8)
RBC # BLD AUTO: 4.97 10E12/L (ref 3.8–5.2)
RBC #/AREA URNS AUTO: <1 /HPF (ref 0–2)
SODIUM SERPL-SCNC: 140 MMOL/L (ref 133–144)
SOURCE: ABNORMAL
SP GR UR STRIP: 1.01 (ref 1–1.03)
UROBILINOGEN UR STRIP-MCNC: NORMAL MG/DL (ref 0–2)
WBC # BLD AUTO: 7.5 10E9/L (ref 4–11)
WBC #/AREA URNS AUTO: 1 /HPF (ref 0–5)

## 2018-07-29 PROCEDURE — 80053 COMPREHEN METABOLIC PANEL: CPT | Performed by: PHYSICIAN ASSISTANT

## 2018-07-29 PROCEDURE — 25000128 H RX IP 250 OP 636: Performed by: PHYSICIAN ASSISTANT

## 2018-07-29 PROCEDURE — 99285 EMERGENCY DEPT VISIT HI MDM: CPT | Mod: 25

## 2018-07-29 PROCEDURE — 99284 EMERGENCY DEPT VISIT MOD MDM: CPT | Performed by: PHYSICIAN ASSISTANT

## 2018-07-29 PROCEDURE — 96361 HYDRATE IV INFUSION ADD-ON: CPT

## 2018-07-29 PROCEDURE — 85025 COMPLETE CBC W/AUTO DIFF WBC: CPT | Performed by: PHYSICIAN ASSISTANT

## 2018-07-29 PROCEDURE — 36415 COLL VENOUS BLD VENIPUNCTURE: CPT | Performed by: PHYSICIAN ASSISTANT

## 2018-07-29 PROCEDURE — 96374 THER/PROPH/DIAG INJ IV PUSH: CPT

## 2018-07-29 PROCEDURE — 96375 TX/PRO/DX INJ NEW DRUG ADDON: CPT

## 2018-07-29 PROCEDURE — 74176 CT ABD & PELVIS W/O CONTRAST: CPT | Mod: TC

## 2018-07-29 PROCEDURE — 81001 URINALYSIS AUTO W/SCOPE: CPT | Performed by: PHYSICIAN ASSISTANT

## 2018-07-29 RX ORDER — OXYCODONE AND ACETAMINOPHEN 5; 325 MG/1; MG/1
1-2 TABLET ORAL EVERY 6 HOURS PRN
Qty: 12 TABLET | Refills: 0 | Status: SHIPPED | OUTPATIENT
Start: 2018-07-29 | End: 2019-03-09

## 2018-07-29 RX ORDER — SODIUM CHLORIDE 9 MG/ML
1000 INJECTION, SOLUTION INTRAVENOUS CONTINUOUS
Status: DISCONTINUED | OUTPATIENT
Start: 2018-07-29 | End: 2018-07-29 | Stop reason: HOSPADM

## 2018-07-29 RX ORDER — HYDROMORPHONE HYDROCHLORIDE 1 MG/ML
0.5 INJECTION, SOLUTION INTRAMUSCULAR; INTRAVENOUS; SUBCUTANEOUS
Status: COMPLETED | OUTPATIENT
Start: 2018-07-29 | End: 2018-07-29

## 2018-07-29 RX ORDER — KETOROLAC TROMETHAMINE 30 MG/ML
30 INJECTION, SOLUTION INTRAMUSCULAR; INTRAVENOUS ONCE
Status: COMPLETED | OUTPATIENT
Start: 2018-07-29 | End: 2018-07-29

## 2018-07-29 RX ADMIN — KETOROLAC TROMETHAMINE 30 MG: 30 INJECTION, SOLUTION INTRAMUSCULAR at 10:45

## 2018-07-29 RX ADMIN — SODIUM CHLORIDE 1000 ML: 9 INJECTION, SOLUTION INTRAVENOUS at 10:45

## 2018-07-29 RX ADMIN — Medication 0.5 MG: at 10:39

## 2018-07-29 ASSESSMENT — ENCOUNTER SYMPTOMS
BACK PAIN: 0
NECK PAIN: 0
DIARRHEA: 0
COUGH: 0
SHORTNESS OF BREATH: 0
FREQUENCY: 0
CONSTIPATION: 0
NAUSEA: 1
ABDOMINAL PAIN: 0
VOMITING: 0
FLANK PAIN: 1
FEVER: 0
APPETITE CHANGE: 0
CHILLS: 0

## 2018-07-29 NOTE — ED PROVIDER NOTES
History     Chief Complaint   Patient presents with     Flank Pain     left side flank pain since friday. has tried ibu and icey hot thinking it was her back     The history is provided by the patient.     Shefali Dailey is a 63 year old female who presented to the emergency department ambulatory along with  for evaluation of left flank pain.  Pain is been present for 2 days.  Began rather abruptly on Friday.  Focally located directly in the left flank.  Denies any cough or respiratory symptoms.  Denies any lower urinary tract symptoms.  Denies any fevers or chills.  Denies any unusual rashes.  Denies any chest pains or pressures.  Denies any abdominal discomfort.  Pain is described as sharp and colicky and rated 8 on the 10 scale.  She has been using Tylenol and ibuprofen without relief.    Problem List:    Patient Active Problem List    Diagnosis Date Noted     ACP (advance care planning) 02/06/2013     Priority: Medium     Advance Care Planning 5/3/2017: ACP Review of Chart / Resources Provided:  Reviewed chart for advance care plan.  Shefali Dailey has been provided information and resources to begin or update their advance care plan.  Added by Shania Cobos                Past Medical History:    Past Medical History:   Diagnosis Date     Malignant neoplasm of breast (female), unspecified 11/28/2005       Past Surgical History:    Past Surgical History:   Procedure Laterality Date     APPENDECTOMY  2004     ARTHROSCOPY KNEE Left 4/9/2015    Procedure: ARTHROSCOPY KNEE;  Surgeon: Arden Oreilly MD;  Location: [facility] OR     bilateral mastectomy      Bilateral, lymph nodes removed from left side     breast reconstruction  6/2006    Bilateral, expansion with saline implants     colonoscopy with biopsy  2012     DILATION AND CURETTAGE      D&C, multiple     EGD with biopsy  2012     needle loc breast biopsy  2005    LT     RAJESH/BSO  2004     TONSILLECTOMY  1961       Family History:    Family History    Problem Relation Age of Onset     Diabetes Mother      Myocardial Infarction Mother 72     myocardial infarction, cause of death     Other - See Comments Father 63     liver cirrhosis     Cerebrovascular Disease Father 64     cva, cause of death     Breast Cancer Daughter      Other - See Comments Sister      cholelithiasis     Other - See Comments Sister      Diabetes Sister      IDDM     Cancer - colorectal Sister      59     Diabetes Sister      diet controlled     Other - See Comments Sister      billary cirrhosis, stage 4       Social History:  Marital Status:   [4]  Social History   Substance Use Topics     Smoking status: Current Every Day Smoker     Packs/day: 0.50     Years: 34.00     Types: Cigarettes     Smokeless tobacco: Never Used      Comment: pt denied quit plan 7/5/18     Alcohol use 0.0 oz/week     0 Standard drinks or equivalent per week      Comment: rarely        Medications:      IBUPROFEN PO   oxyCODONE-acetaminophen (PERCOCET) 5-325 MG per tablet   venlafaxine (EFFEXOR-XR) 75 MG 24 hr capsule   dexamethasone (DECADRON) 4 MG/ML injection         Review of Systems   Constitutional: Negative for appetite change, chills and fever.   Respiratory: Negative for cough and shortness of breath.    Cardiovascular: Negative for chest pain.   Gastrointestinal: Positive for nausea. Negative for abdominal pain, constipation, diarrhea and vomiting.   Genitourinary: Positive for flank pain. Negative for decreased urine volume, frequency, pelvic pain and urgency.   Musculoskeletal: Negative for back pain and neck pain.   Skin: Negative.        Physical Exam   BP: 170/91  Pulse: 86  Heart Rate: 54  Temp: 97  F (36.1  C)  Resp: 20  SpO2: 97 %      Physical Exam   Constitutional: She is oriented to person, place, and time. She appears well-developed and well-nourished.   Cardiovascular: Normal rate and regular rhythm.    Pulmonary/Chest: Effort normal and breath sounds normal.   Genitourinary:    Genitourinary Comments: No appreciable CVA tenderness.   Musculoskeletal:        Back:    Neurological: She is alert and oriented to person, place, and time.   Skin: Skin is warm and dry.   Psychiatric: She has a normal mood and affect.   Nursing note and vitals reviewed.      ED Course     ED Course     Procedures               Critical Care time:  none               Results for orders placed or performed during the hospital encounter of 07/29/18 (from the past 24 hour(s))   UA with Microscopic   Result Value Ref Range    Color Urine Light Yellow     Appearance Urine Clear     Glucose Urine Negative NEG^Negative mg/dL    Bilirubin Urine Negative NEG^Negative    Ketones Urine Negative NEG^Negative mg/dL    Specific Gravity Urine 1.011 1.003 - 1.035    Blood Urine Negative NEG^Negative    pH Urine 7.5 4.7 - 8.0 pH    Protein Albumin Urine Negative NEG^Negative mg/dL    Urobilinogen mg/dL Normal 0.0 - 2.0 mg/dL    Nitrite Urine Negative NEG^Negative    Leukocyte Esterase Urine Negative NEG^Negative    Source Midstream Urine     WBC Urine 1 0 - 5 /HPF    RBC Urine <1 0 - 2 /HPF    Bacteria Urine Few (A) NEG^Negative /HPF    Mucous Urine Present (A) NEG^Negative /LPF   CBC with platelets differential   Result Value Ref Range    WBC 7.5 4.0 - 11.0 10e9/L    RBC Count 4.97 3.8 - 5.2 10e12/L    Hemoglobin 15.2 11.7 - 15.7 g/dL    Hematocrit 44.5 35.0 - 47.0 %    MCV 90 78 - 100 fl    MCH 30.6 26.5 - 33.0 pg    MCHC 34.2 31.5 - 36.5 g/dL    RDW 13.2 10.0 - 15.0 %    Platelet Count 232 150 - 450 10e9/L    Diff Method Automated Method     % Neutrophils 69.4 %    % Lymphocytes 22.3 %    % Monocytes 6.1 %    % Eosinophils 1.5 %    % Basophils 0.4 %    % Immature Granulocytes 0.3 %    Nucleated RBCs 0 0 /100    Absolute Neutrophil 5.2 1.6 - 8.3 10e9/L    Absolute Lymphocytes 1.7 0.8 - 5.3 10e9/L    Absolute Monocytes 0.5 0.0 - 1.3 10e9/L    Absolute Eosinophils 0.1 0.0 - 0.7 10e9/L    Absolute Basophils 0.0 0.0 - 0.2 10e9/L     Abs Immature Granulocytes 0.0 0 - 0.4 10e9/L    Absolute Nucleated RBC 0.0    Comprehensive metabolic panel   Result Value Ref Range    Sodium 140 133 - 144 mmol/L    Potassium 4.2 3.4 - 5.3 mmol/L    Chloride 107 94 - 109 mmol/L    Carbon Dioxide 27 20 - 32 mmol/L    Anion Gap 6 3 - 14 mmol/L    Glucose 113 (H) 70 - 99 mg/dL    Urea Nitrogen 17 7 - 30 mg/dL    Creatinine 0.60 0.52 - 1.04 mg/dL    GFR Estimate >90 >60 mL/min/1.7m2    GFR Estimate If Black >90 >60 mL/min/1.7m2    Calcium 8.6 8.5 - 10.1 mg/dL    Bilirubin Total 0.4 0.2 - 1.3 mg/dL    Albumin 4.1 3.4 - 5.0 g/dL    Protein Total 7.7 6.8 - 8.8 g/dL    Alkaline Phosphatase 88 40 - 150 U/L    ALT 23 0 - 50 U/L    AST 13 0 - 45 U/L   CT Abdomen Pelvis w/o Contrast    Narrative    CT ABDOMEN PELVIS W/O CONTRAST    CLINICAL HISTORY: Female, age 63 years,  left flank pain; ;    Comparison:  CT scan abdomen and pelvis 3/6/2012    TECHNIQUE:  CT was performed of the abdomen and pelvis without   contrast. Sagittal, coronal and axial reconstructions were reviewed.     FINDINGS:    Abdomen/Pelvis CT:  Lung Bases:  Mild dependent and peripheral atelectasis.    Esophagus/stomach: Normal.    Liver:  Normal.    Gallbladder: Normal.    Spleen: Normal.    Pancreas: Normal.    Adrenal Glands: Normal.    Kidneys: Normal.  Ureters: Normal.  Urinary bladder: Normal.    Abdominal Aorta: Scattered atherosclerotic calcifications.  IVC: Normal.    Lymph Nodes: Normal.    Large and Small Bowel: Diverticulosis of the distal colon. There is  very mild fat stranding adjacent to a couple of the diverticula  suggesting mild diverticulitis. No perforation. No abscess.  Appendix: Not seen. No secondary signs of appendicitis.    Pelvic Organs:  The uterus is surgically absent. Mild circumferential  wall thickening of the rectum.  Peritoneum: Normal.  Bony structures: No acute abnormality. Mild degenerative changes of  the SI joints and moderate degenerative changes of the  lumbosacral  junction.    Other Findings:  Inguinal lymph nodes are normal.      Impression    IMPRESSION:  1.   Findings suggesting very mild sigmoid diverticulitis. No evidence  of abscess nor evidence of perforation.    2.  Circumferential wall thickening of the rectum and distal sigmoid  colon suggesting localized inflammation.    MELLO GONZALEZ MD       Medications   0.9% sodium chloride BOLUS (0 mLs Intravenous Stopped 7/29/18 1129)     Followed by   sodium chloride 0.9% infusion (not administered)   HYDROmorphone (PF) (DILAUDID) injection 0.5 mg (0.5 mg Intravenous Given 7/29/18 1039)   ketorolac (TORADOL) injection 30 mg (30 mg Intravenous Given 7/29/18 1045)       Assessments & Plan (with Medical Decision Making)   Workup as above.  CT scan of the abdomen and pelvis shows no apparent hydronephrosis or ureteral obstruction.  Symptoms are sharp and very focal to the left flank.  There is no appreciable CVA tenderness.  She has no chest pains or shortness of breath.  No other concerning or red flag symptoms.  Basic laboratory evaluation is negative.  Significant relief with Toradol and Dilaudid.  With the possible mild sigmoid diverticulitis, and no GI symptoms, I believe that treatment based on the CT findings would have more risks than benefits.  The newest guidelines from the AGA would support this.  I did discuss the finding of her CT scan and advised that she follow-up in the clinic or return here for any lower abdominal discomfort, fevers, or other concerns.  Pain down to a 2 on the 10 scale.  Much more comfortable.  Long detailed discussion.  Certainly may be coming from her back.  At this point in the emergency department I can find no reasonable or compelling medical indication for further workup, intervention, or imaging at this time.  Symptoms, history of present illness, exam, and workup is not consistent with acute coronary syndrome, PE, aortic dissection, spinal epidural abscess, pancreatitis,  or other catastrophic event.  Percocet sparingly along with ibuprofen.  Ambulation as tolerated.  Return to the emergency department for any chest pain, shortness of breath, pain that changes in quality, location, or character, diaphoresis, or any other questions or concerns.  Follow-up in the clinic this week for persistent symptoms.    (Please note that this note was completed with a voice recognition program.  Every attempt was made to edit the dictations, but inevitably there remain words that are mis-transcribed.)    I have reviewed the nursing notes.    I have reviewed the findings, diagnosis, plan and need for follow up with the patient.       New Prescriptions    OXYCODONE-ACETAMINOPHEN (PERCOCET) 5-325 MG PER TABLET    Take 1-2 tablets by mouth every 6 hours as needed for pain       Final diagnoses:   Flank pain       7/29/2018   [facility]     Daryl Salinas PA-C  07/29/18 1134

## 2018-07-29 NOTE — ED AVS SNAPSHOT
[facility]    [address] [address]    Phone:  [phone]                                       Shefali Dailey   MRN: [id number]    Department:  [facility]   Date of Visit:  7/29/2018           After Visit Summary Signature Page     I have received my discharge instructions, and my questions have been answered. I have discussed any challenges I see with this plan with the nurse or doctor.    ..........................................................................................................................................  Patient/Patient Representative Signature      ..........................................................................................................................................  Patient Representative Print Name and Relationship to Patient    ..................................................               ................................................  Date                                            Time    ..........................................................................................................................................  Reviewed by Signature/Title    ...................................................              ..............................................  Date                                                            Time

## 2018-07-29 NOTE — ED NOTES
"Patient being evaluated today for continued left flank pain. Patient states that she was at work on Thursday when she started to experience sudden back pain. She described this as a \"back spasm\" which she has had in the past. She has been trying usual home therapies with no relief. Yesterday, the pain worsened, became constant, and changed in characteristic. Patient denies any radiation with pain, nausea, vomiting, or urinary symptoms. Patient alert and oriented. She is pacing in the room as she appears very uncomfortable.   "

## 2018-07-29 NOTE — ED AVS SNAPSHOT
[facility]    [address] [address]    Phone:  [phone]                                       Shefali Dailey   MRN: [id number]    Department:  [facility]   Date of Visit:  7/29/2018           Patient Information     Date Of Birth          1955        Your diagnoses for this visit were:     Flank pain        You were seen by Daryl Salinas PA-C.      Follow-up Information     Schedule an appointment as soon as possible for a visit with Yosef Gonzales MD.    Specialty:  Family Practice    Contact information:    [address]  [facility] [address] [phone]          Follow up with [facility].    Specialty:  EMERGENCY MEDICINE    Why:  If symptoms worsen    Contact information:    [address] [address] [phone]    Additional information:    From [facility]: Take [address]. Turn left at [address]. Turn left at the first stoplight on [address]. At the first stop sign, take a right onto [address]. Take a left into the parking lot and continue through until you reach the North enterance of the building.       From [place]: Take [address]. Take the [address] ramp towards [place]. Turn left onto [address]. Take a slight right onto [address]. Turn left at the first stoplight on [address]. At the first stop sign, take a right onto [address]. Take a left into the parking lot and continue through until you reach the North enterance of the building.       From [place]: Take [address]. Take a right at [address]. At the first stop sign, take a right onto [address]. Take a left into the parking lot and continue through until you reach the North enterance of the building.         Discharge Instructions       Your basic lab work was normal today.  Your CT scan showed mild diverticulitis and no other abnormalities.  I  believe that the current evidence does not support routine antibiotic usage in uncomplicated diverticulitis.  Flank pain can have multiple causes.  It does not appear to be coming from your kidney today.  Sometimes this comes from your back, or can be referred from other areas. You may continue ibuprofen every 6 hours.  Start percocet sparingly.  Please return here for ANY worsening pain, vomiting, chest pain, shortness of breath, sweating, or ANY other concerns or questions.     Discharge References/Attachments     FLANK PAIN, UNCERTAIN CAUSE (ENGLISH)         Review of your medicines      START taking        Dose / Directions Last dose taken    oxyCODONE-acetaminophen 5-325 MG per tablet   Commonly known as:  PERCOCET   Dose:  1-2 tablet   Quantity:  12 tablet        Take 1-2 tablets by mouth every 6 hours as needed for pain   Refills:  0          Our records show that you are taking the medicines listed below. If these are incorrect, please call your family doctor or clinic.        Dose / Directions Last dose taken    dexamethasone 4 MG/ML injection   Commonly known as:  DECADRON   Dose:  4 mg   Quantity:  1 mL        Inject 1 mL (4 mg) as directed once for 1 dose Use 4 mg or dose determined by provider for iontophoresis.   Refills:  0        IBUPROFEN PO   Dose:  600 mg        Take 600 mg by mouth every 8 hours as needed for moderate pain   Refills:  0        venlafaxine 75 MG 24 hr capsule   Commonly known as:  EFFEXOR-XR   Dose:  75 mg   Quantity:  90 capsule        Take 1 capsule (75 mg) by mouth daily   Refills:  3                Information about OPIOIDS     PRESCRIPTION OPIOIDS: WHAT YOU NEED TO KNOW   We gave you an opioid (narcotic) pain medicine. It is important to manage your pain, but opioids are not always the best choice. You should first try all the other options your care team gave you. Take this medicine for as short a time (and as few doses) as possible.     These medicines have risks:    DO NOT  drive when on new or higher doses of pain medicine. These medicines can affect your alertness and reaction times, and you could be arrested for driving under the influence (DUI). If you need to use opioids long-term, talk to your care team about driving.    DO NOT operate heave machinery    DO NOT do any other dangerous activities while taking these medicines.     DO NOT drink any alcohol while taking these medicines.      If the opioid prescribed includes acetaminophen, DO NOT take with any other medicines that contain acetaminophen. Read all labels carefully. Look for the word  acetaminophen  or  Tylenol.  Ask your pharmacist if you have questions or are unsure.    You can get addicted to pain medicines, especially if you have a history of addiction (chemical, alcohol or substance dependence). Talk to your care team about ways to reduce this risk.    Store your pills in a secure place, locked if possible. We will not replace any lost or stolen medicine. If you don t finish your medicine, please throw away (dispose) as directed by your pharmacist. The [place] [employer]ution [employer] has more information about safe disposal: https://www.pca.[facility].mn.us/living-green/managing-unwanted-medications.     All opioids tend to cause constipation. Drink plenty of water and eat foods that have a lot of fiber, such as fruits, vegetables, prune juice, apple juice and high-fiber cereal. Take a laxative (Miralax, milk of magnesia, Colace, Senna) if you don t move your bowels at least every other day.         Prescriptions were sent or printed at these locations (1 Prescription)                   [facility]  [address]   [address] [address]    Telephone:  [phone]   Fax:  [phone]   Hours:                  Printed at Department/Unit printer (1 of 1)         oxyCODONE-acetaminophen (PERCOCET) 5-325 MG per tablet                Procedures and tests performed during your  visit     CBC with platelets differential    CT Abdomen Pelvis w/o Contrast    Comprehensive metabolic panel    Peripheral IV catheter    UA with Microscopic      Orders Needing Specimen Collection     None      Pending Results     No orders found from 7/27/2018 to 7/30/2018.            Pending Culture Results     No orders found from 7/27/2018 to 7/30/2018.            Thank you for choosing [facility]       Thank you for choosing [facility] for your care. Our goal is always to provide you with excellent care. Hearing back from our patients is one way we can continue to improve our services. Please take a few minutes to complete the written survey that you may receive in the mail after you visit with us. Thank you!        [facility] Information     [employer] gives you secure access to your electronic health record. If you see a primary care provider, you can also send messages to your care team and make appointments. If you have questions, please call your primary care clinic.  If you do not have a primary care provider, please call [phone] and they will assist you.        Care EveryWhere ID     This is your Care EveryWhere ID. This could be used by other organizations to access your [facility] medical records  [id number]        Equal Access to Services     YOUSUF CHAO : Hadsin Duncan, waperlada neville, qausman raymond, salina monsivais. So [facility] [phone].    ATENCIÓN: Si habla español, tiene a peters disposición servicios gratuitos de asistencia lingüística. Llame al [phone].    We comply with applicable federal civil rights laws and [place] laws. We do not discriminate on the basis of race, color, national origin, age, disability, sex, sexual orientation, or gender identity.            After Visit Summary       This is your record. Keep this with you and show to your community pharmacist(s) and doctor(s) at your next visit.

## 2018-07-29 NOTE — DISCHARGE INSTRUCTIONS
Your basic lab work was normal today.  Your CT scan showed mild diverticulitis and no other abnormalities.  I believe that the current evidence does not support routine antibiotic usage in uncomplicated diverticulitis.  Flank pain can have multiple causes.  It does not appear to be coming from your kidney today.  Sometimes this comes from your back, or can be referred from other areas. You may continue ibuprofen every 6 hours.  Start percocet sparingly.  Please return here for ANY worsening pain, vomiting, chest pain, shortness of breath, sweating, or ANY other concerns or questions.

## 2018-10-02 ENCOUNTER — TELEPHONE (OUTPATIENT)
Dept: ORTHOPEDICS | Facility: OTHER | Age: 63
End: 2018-10-02

## 2018-10-02 ENCOUNTER — APPOINTMENT (OUTPATIENT)
Dept: OCCUPATIONAL MEDICINE | Facility: OTHER | Age: 63
End: 2018-10-02

## 2018-10-02 NOTE — TELEPHONE ENCOUNTER
Patient given appointment patient states she is having increase pain to knee and hip and seems to be getting worse not better. Patient did not need appointment ASAP as this has been going on awhile, and will be on vacation end of August and is not going anywhere and this will work out best.   Alda Whatley LPN

## 2018-10-02 NOTE — TELEPHONE ENCOUNTER
Having trouble with her knee again can get hold of her by ext [phone] or [phone] you can also leave a message on [employer] she needs an appointment anytime if available after 3pm. Please advise.

## 2018-10-23 DIAGNOSIS — M25.552 LEFT HIP PAIN: Primary | ICD-10-CM

## 2018-10-31 ENCOUNTER — OFFICE VISIT (OUTPATIENT)
Dept: ORTHOPEDICS | Facility: OTHER | Age: 63
End: 2018-10-31
Attending: ORTHOPAEDIC SURGERY
Payer: COMMERCIAL

## 2018-10-31 VITALS
WEIGHT: 220 LBS | BODY MASS INDEX: 30.8 KG/M2 | OXYGEN SATURATION: 98 % | SYSTOLIC BLOOD PRESSURE: 138 MMHG | DIASTOLIC BLOOD PRESSURE: 72 MMHG | TEMPERATURE: 96.7 F | HEART RATE: 76 BPM | HEIGHT: 71 IN

## 2018-10-31 DIAGNOSIS — M25.562 ACUTE PAIN OF LEFT KNEE: ICD-10-CM

## 2018-10-31 DIAGNOSIS — M17.12 PRIMARY OSTEOARTHRITIS OF LEFT KNEE: Primary | ICD-10-CM

## 2018-10-31 DIAGNOSIS — G89.29 CHRONIC LEFT HIP PAIN: ICD-10-CM

## 2018-10-31 DIAGNOSIS — Z72.0 TOBACCO ABUSE: ICD-10-CM

## 2018-10-31 DIAGNOSIS — M25.552 LEFT HIP PAIN: ICD-10-CM

## 2018-10-31 DIAGNOSIS — M25.552 CHRONIC LEFT HIP PAIN: ICD-10-CM

## 2018-10-31 PROCEDURE — 99213 OFFICE O/P EST LOW 20 MIN: CPT | Mod: 25 | Performed by: ORTHOPAEDIC SURGERY

## 2018-10-31 PROCEDURE — 73502 X-RAY EXAM HIP UNI 2-3 VIEWS: CPT | Mod: TC

## 2018-10-31 PROCEDURE — 20610 DRAIN/INJ JOINT/BURSA W/O US: CPT | Mod: LT | Performed by: ORTHOPAEDIC SURGERY

## 2018-10-31 RX ORDER — DEXAMETHASONE SODIUM PHOSPHATE 4 MG/ML
4 INJECTION, SOLUTION INTRA-ARTICULAR; INTRALESIONAL; INTRAMUSCULAR; INTRAVENOUS; SOFT TISSUE ONCE
Qty: 1 ML | Refills: 0 | OUTPATIENT
Start: 2018-10-31 | End: 2018-11-14

## 2018-10-31 ASSESSMENT — PAIN SCALES - GENERAL: PAINLEVEL: MILD PAIN (3)

## 2018-10-31 NOTE — NURSING NOTE
"Chief Complaint   Patient presents with     Pain     New issue-Left Hip     RECHECK     Left Knee       Initial /72  Pulse 76  Temp 96.7  F (35.9  C) (Tympanic)  Ht 5' 10.5\" (1.791 m)  Wt 220 lb (99.8 kg)  SpO2 98%  BMI 31.12 kg/m2 Estimated body mass index is 31.12 kg/(m^2) as calculated from the following:    Height as of this encounter: 5' 10.5\" (1.791 m).    Weight as of this encounter: 220 lb (99.8 kg).  Medication Reconciliation: complete    Keyanna Villavicencio LPN    "

## 2018-10-31 NOTE — MR AVS SNAPSHOT
After Visit Summary   10/31/2018    Shefali Dailey    MRN: [id number]           Patient Information     Date Of Birth          1955        Visit Information        Provider Department      10/31/2018 8:40 AM Arden Oreilly MD [facility]        Today's Diagnoses     Primary osteoarthritis of left knee    -  1    Chronic left hip pain        Tobacco abuse        Acute pain of left knee          Care Instructions    The X-ray Department of this [facility] will call you within 2 business days to schedule an MRI.  If you do not hear from them by the 3rd business day, call our office at [phone].            Follow-ups after your visit        Follow-up notes from your care team     Return in about 2 weeks (around 11/14/2018).      Your next 10 appointments already scheduled     Nov 12, 2018  7:15 AM CST   (Arrive by 7:00 AM)   MR KNEE LEFT W/O CONTRAST with HIMR1   [facility] ([facility] )    [address] [place] [id number] [phone]           How do I prepare for my exam? (Food and drink instructions) **If you will be receiving sedation or general anesthesia, please see special notes below.**  How do I prepare for my exam? (Other instructions) Take your medicines as usual, unless your doctor tells you not to. Please remove any body piercings and hair extensions before you arrive. Follow your doctor s orders. If you do not, we may have to postpone your exam. You may or may not receive IV contrast for this exam pending the discretion of the Radiologist.  You do not need to do anything special to prepare. **If you will be receiving sedation or general anesthesia, please see special notes below.**  What should I wear:  The MRI machine uses a strong magnet. Please wear clothes without metal (snaps, zippers). A sweatsuit works well, or we may give you a hospital gown.  How long does the exam take: Most tests take 30 to 60 minutes.  HOWEVER, IF YOUR DOCTOR  PRESCRIBES ANESTHESIA please plan on spending four to five hours in the recovery room.  What should I bring: Bring a list of your current medicines to your exam (including vitamins, minerals and over-the-counter drugs). Also bring the results of similar scans you may have had.  Do I need a : **If you will be receiving sedation or general anesthesia, please see special notes below.**  What should I do after the exam? No Restrictions, You may resume normal activities.  What is this test: MRI (magnetic resonance imaging) uses a strong magnet and radio waves to look inside the body. An MRA (magnetic resonance angiogram) does the same thing, but it lets us look at your blood vessels. A computer turns the radio waves into pictures showing cross sections of the body, much like slices of bread. This helps us see any problems more clearly.  Who should I call with questions: Please call the Imaging Department at your exam site with any questions. Directions, parking instructions, and other information is available on our website, [email or web address].  How do I prepare if I m having sedation or anesthesia? **IMPORTANT** THE INSTRUCTIONS BELOW ARE ONLY FOR THOSE PATIENTS WHO HAVE BEEN TOLD THEY WILL RECEIVE SEDATION OR GENERAL ANESTHESIA DURING THEIR MRI PROCEDURE:  IF YOU WILL RECEIVE SEDATION (take medicine to help you relax during your exam): You must get the medicine from your doctor before you arrive. Bring the medicine to the exam. Do not take it at home. Arrive one hour early. Bring someone who can take you home after the test. Your medicine will make you sleepy. After the exam, you may not drive, take a bus or take a taxi by yourself. No eating 8 hours before your exam. You may have clear liquids up until 4 hours before your exam. (Clear liquids include water, clear tea, black coffee and fruit juice without pulp.)  IF YOU WILL RECEIVE ANESTHESIA (be asleep for your exam): Arrive 1 1/2 hours early. Bring someone  who can take you home after the test. You may not drive, take a bus or take a taxi by yourself. No eating 8 hours before your exam. You may have clear liquids up until 4 hours before your exam. (Clear liquids include water, clear tea, black coffee and fruit juice without pulp.) You will spend four to five hours in the recovery room.            Nov 14, 2018 11:00 AM CST   (Arrive by 10:45 AM)   [facility] Visit with [facility]   [facility]    [address]  [place] [address] [phone]              Future tests that were ordered for you today     Open Future Orders        Priority Expected Expires Ordered    MR Knee Left w/o Contrast Routine  10/31/2019 10/31/2018            Who to contact     If you have questions or need follow up information about today's clinic visit or your schedule please contact [facility] directly at [phone].  Normal or non-critical lab and imaging results will be communicated to you by zappithart, letter or phone within 4 business days after the clinic has received the results. If you do not hear from us within 7 days, please contact the clinic through [employer] or phone. If you have a critical or abnormal lab result, we will notify you by phone as soon as possible.  Submit refill requests through [employer] or call your pharmacy and they will forward the refill request to us. Please allow 3 business days for your refill to be completed.          Additional Information About Your Visit        [employer] Information     [employer] gives you secure access to your electronic health record. If you see a primary care provider, you can also send messages to your care team and make appointments. If you have questions, please call your primary care clinic.  If you do not have a primary care provider, please call [phone] and they will assist you.        Care EveryWhere ID     This is your Care EveryWhere ID.  "This could be used by other organizations to access your [facility] medical records  [id number]        Your Vitals Were     Pulse Temperature Height Pulse Oximetry BMI (Body Mass Index)       76 96.7  F (35.9  C) (Tympanic) 5' 10.5\" (1.791 m) 98% 31.12 kg/m2        Blood Pressure from Last 3 Encounters:   10/31/18 138/72   07/29/18 134/84   07/05/18 104/60    Weight from Last 3 Encounters:   10/31/18 220 lb (99.8 kg)   07/05/18 220 lb (99.8 kg)   02/28/18 217 lb 4 oz (98.5 kg)              We Performed the Following     DEXAMETHASONE SODIUM PHOS PER 1 MG     Large Joint/Bursa injection and/or drainage (Shoulder, Knee)          Where to get your medicines      Some of these will need a paper prescription and others can be bought over the counter.  Ask your nurse if you have questions.     You don't need a prescription for these medications     dexamethasone 4 MG/ML injection          Primary Care Provider Office Phone # Fax #    Yosef Gonzales [phone]       [address] [place] [address]        Equal Access to Services     [facility] AH: Hadii kayla pascual hadasho Soveda, waaxda luqadaha, qaybta kaalmada adeegyada, salina monsivais. So [facility] [phone].    ATENCIÓN: Si habla español, tiene a peters disposición servicios gratuitos de asistencia lingüística. [facility] [phone].    We comply with applicable federal civil rights laws and [place] laws. We do not discriminate on the basis of race, color, national origin, age, disability, sex, sexual orientation, or gender identity.            Thank you!     Thank you for choosing [facility]  for your care. Our goal is always to provide you with excellent care. Hearing back from our patients is one way we can continue to improve our services. Please take a few minutes to complete the written survey that you may receive in the mail after your visit with us. Thank you!             Your Updated " Medication List - Protect others around you: Learn how to safely use, store and throw away your medicines at www.disposemymeds.org.          This list is accurate as of 10/31/18  9:40 AM.  Always use your most recent med list.                   Brand Name Dispense Instructions for use Diagnosis    dexamethasone 4 MG/ML injection    DECADRON    1 mL    Inject 1 mL (4 mg) as directed once for 1 dose Use 4 mg or dose determined by provider for iontophoresis.    Primary osteoarthritis of left knee       IBUPROFEN PO      Take 600 mg by mouth every 8 hours as needed for moderate pain        oxyCODONE-acetaminophen 5-325 MG per tablet    PERCOCET    12 tablet    Take 1-2 tablets by mouth every 6 hours as needed for pain        venlafaxine 75 MG 24 hr capsule    EFFEXOR-XR    90 capsule    Take 1 capsule (75 mg) by mouth daily    Acute reaction to stress

## 2018-10-31 NOTE — PROGRESS NOTES
"Chief complaint:   #1.   Primary osteoarthritis left knee with recurrent Baker's cyst    1a. S/p APMM left knee 4/9/2015  #2.   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 Gonzales     HPI:   Left knee: She is a long history of DJD of the left knee.  She is status post APMM on 4/9/2015.  Grade 2 changes were seen in the trochlear groove, MFC, and both tibial plateaus. This knee has also has a Baker's cyst that comes and goes.  She has not had good results with physical therapy, steroid injections, and NSAIDs.  She had an excellent outcome with a Synvisc injection on 8/24/2017.  It was repeated on 7/5/2018.    Left hip: She states that the chemotherapy she had for her breast cancer 13 years ago in [place] damaged the articular cartilage of her left hip.  She says a bone scan done in [place] was positive for uptake consistent with degenerative disease at the left hip.  She has had off-and-on left hip discomfort for 13 years but during this year the pain has significantly increased.  It is located posterolaterally and is intermittent, provoked by weightbearing activities - especially stairs.  Ibuprofen used to help this problem but no longer helps.    Subjective: Since her Synvisc injection her left knee pain has been worse.  Her left knee intermittently swells.  When she feels pain it is felt superolaterally, posteriorly and posterior medially.  She also notes swelling over the posterior medial knee which she attributes to recurrence of her Baker's cyst.  She denies a recent injury to the knee.    She still smokes and is now interested in being referred to the Quit Plan.    Objective:/72  Pulse 76  Temp 96.7  F (35.9  C) (Tympanic)  Ht 5' 10.5\" (1.791 m)  Wt 220 lb (99.8 kg)  SpO2 98%  BMI 31.12 kg/m2  Healthy-appearing female with appropriate mood and affect.  The skin in the left lower extremity is intact.  The " left hip is nontender anteriorly and posteriorly but tender posterolaterally over the greater trochanter.  Its range of motion is symmetric with the opposite asymptomatic side.  The impingement test is negative.     The left knee has no effusion.  There is soft tissue fullness posteromedially but I feel no fluid collection.  I feel no Baker's cyst posteriorly.  The left knee range of motion is full.  It is stable to ligamentous stressing.  Ewelina's maneuvers are subjectively negative but she states she can feel an unpleasant pop which I do not feel, as the knee is maneuvered.    The neurovascular status of the left leg is intact.    X-ray; plain films of the left hip taken today show slightly increased sclerosis of the superior weightbearing portion of the acetabulum but are otherwise unremarkable.    Last left knee films were taken on 8/24/2017 and showed moderate narrowing of the medial joint space with no interval change.    Impression:  #1.  Primary osteoarthritis left knee, s/p APMM  #2.  Increasing right knee pain with subjectively positive Ewelina's maneuver ? new meniscal tear, ?  Medial meniscal cyst  #3.  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  Acute on chronic left hip pain  #5.  Tobacco abuse    Plan:  Left knee: I recommend an MRI.  She will return afterwards to get the results.  Left hip: I recommended a greater trochanteric bursal steroid injection.  If that does not solve the problem then I recommend physical therapy.  She agreed.  She will return in 2 weeks.    Procedure: After obtaining written informed consent and sterilely prepping the site a timeout was held.  Sterile technique was employed as the left hip greater trochanteric bursa was then injected with 4 mg of dexamethasone mixed with 9mL of Carbocaine.  Topical ethyl chloride spray was used as a topical anesthetic.  The patient tolerated the procedure well and there were no complications.

## 2018-10-31 NOTE — PATIENT INSTRUCTIONS
The X-ray Department of this [facility] will call you within 2 business days to schedule an MRI.  If you do not hear from them by the 3rd business day, call our office at [phone].

## 2018-11-12 ENCOUNTER — HOSPITAL ENCOUNTER (OUTPATIENT)
Dept: MRI IMAGING | Facility: HOSPITAL | Age: 63
Discharge: HOME | End: 2018-11-12
Attending: ORTHOPAEDIC SURGERY | Admitting: ORTHOPAEDIC SURGERY
Payer: COMMERCIAL

## 2018-11-12 ENCOUNTER — RESULTS ONLY (OUTPATIENT)
Dept: LAB | Age: 63
End: 2018-11-12

## 2018-11-12 DIAGNOSIS — M25.562 ACUTE PAIN OF LEFT KNEE: ICD-10-CM

## 2018-11-12 DIAGNOSIS — M17.12 PRIMARY OSTEOARTHRITIS OF LEFT KNEE: ICD-10-CM

## 2018-11-12 PROCEDURE — 73721 MRI JNT OF LWR EXTRE W/O DYE: CPT | Mod: TC,LT

## 2018-11-13 LAB — HBV SURFACE AB SERPL IA-ACNC: 145.45 M[IU]/ML

## 2018-11-14 ENCOUNTER — OFFICE VISIT (OUTPATIENT)
Dept: ORTHOPEDICS | Facility: OTHER | Age: 63
End: 2018-11-14
Attending: ORTHOPAEDIC SURGERY
Payer: COMMERCIAL

## 2018-11-14 VITALS
HEART RATE: 74 BPM | OXYGEN SATURATION: 98 % | DIASTOLIC BLOOD PRESSURE: 64 MMHG | HEIGHT: 71 IN | TEMPERATURE: 97 F | BODY MASS INDEX: 30.8 KG/M2 | WEIGHT: 220 LBS | SYSTOLIC BLOOD PRESSURE: 130 MMHG

## 2018-11-14 DIAGNOSIS — Z72.0 TOBACCO ABUSE: ICD-10-CM

## 2018-11-14 DIAGNOSIS — M16.12 UNILATERAL OSTEOARTHRITIS OF HIP, LEFT: ICD-10-CM

## 2018-11-14 DIAGNOSIS — M71.22 BAKER'S CYST, LEFT: ICD-10-CM

## 2018-11-14 DIAGNOSIS — M17.12 PRIMARY OSTEOARTHRITIS OF LEFT KNEE: Primary | ICD-10-CM

## 2018-11-14 PROCEDURE — 20610 DRAIN/INJ JOINT/BURSA W/O US: CPT | Mod: 52 | Performed by: ORTHOPAEDIC SURGERY

## 2018-11-14 PROCEDURE — 99212 OFFICE O/P EST SF 10 MIN: CPT | Mod: 25 | Performed by: ORTHOPAEDIC SURGERY

## 2018-11-14 ASSESSMENT — PAIN SCALES - GENERAL: PAINLEVEL: MILD PAIN (3)

## 2018-11-14 NOTE — PROGRESS NOTES
Chief complaint:   #1.   Primary osteoarthritis left knee with recurrent Baker's cyst    1a. S/p APMM left knee 4/9/2015  #2.  Chemotherapy related osteoarthritis left hip      Patient profile: 63-year-old [facility] [place], breast cancer survivor, smoker, patient of Dr. Gonzales      HPI:   Left knee: She is a long history of DJD of the left knee.  She is status post APMM on 4/9/2015.  Grade 2 changes were seen in the trochlear groove, MFC, and both tibial plateaus. This knee has also has a Baker's cyst that comes and goes.  She has not had good results with physical therapy, steroid injections, and NSAIDs.  She had an excellent outcome with a Synvisc injection on 8/24/2017.  It was repeated on 7/5/2018 but did not help.  At her follow-up visit of 10/31/2018 an MRI was ordered.     Left hip: She states that the chemotherapy she had for her breast cancer 13 years ago in [place] damaged the articular cartilage of her left hip.  She says a bone scan done in [place] was positive for uptake consistent with degenerative disease at the left hip.  She has had intermittent posterolateral left hip discomfort for 13 years but during this year the pain significantly increased.  It is provoked by weightbearing activities - especially stairs.  Ibuprofen used to help  but no longer doees.  She presented for this problem on 10/31/2018.  X-rays showed increased sclerosis of the superior weightbearing portion of the acetabulum indicating early DJD.  She was referred to Interventional Radiology for an intra-articular steroid injection.    Subjective:  Left knee: At present her knee pain is mostly posterior and medial.  She feels a fullness behind the knee making her believe her Baker's cyst has recurred.    Left hip: The intra-articular steroid injection essentially eliminated her hip pain.  She is very pleased.    She still smokes and is on the Quit Plan.    Nothing is changed with respect to her musculoskeletal or  "neurologic review of systems since seen last.    Examination:/64  Pulse 74  Temp 97  F (36.1  C) (Tympanic)  Ht 5' 10.5\" (1.791 m)  Wt 220 lb (99.8 kg)  SpO2 98%  BMI 31.12 kg/m2  Healthy-appearing female with appropriate mood and affect.  Behind the left knee there is a fullness indicating the presence of a Baker's cyst.    X-ray: The MRI of the left knee performed on 11/12/2018 shows articular cartilage thinning in all 3 compartments with small areas of full-thickness cartilage loss on the medial and lateral femoral condyles.  She is status post partial medial meniscectomy with degeneration of the remaining rim.  There is a moderate sized Baker's cyst present.    Impression:   #1.  Primary osteoarthritis left knee with recurrent Baker's cyst  #2.  Chemotherapy related osteoarthritis left hip  #3.  Tobacco abuse    Plan: I offered to aspirate the Baker's cyst and to put her back on a full-strength NSAID.  She agreed.  She has sulindac at home from a prior prescription.  She will use that up first.    A knee replacement is definitely in her future.  She would like to hold off until she is close to intermediate.    Procedure: After obtaining written consent and conducting a timeout the posterior aspect of the left knee was sterilely prepped. Topical ethyl chloride spray  and subcutaneous 1% Xylocaine were used as local anesthetics.  I then attempted to aspirate the Baker's cyst but was unsuccessful.  The needle was removed and a Band-Aid applied.  She tolerated the procedure well and there were no complications.  I offered to refer her to Interventional Radiology to have it aspirated under fluoroscopic guidance but she wants to think about it.  She will let me know.    She will return as needed.        "

## 2018-11-14 NOTE — PATIENT INSTRUCTIONS
Take sulindac with food twice daily , Physical Therapy if NSAID therapy does not work, call our office and we will make a referral. Interventional radiology can drain cyst and referrals will be made if you choose.

## 2018-11-14 NOTE — NURSING NOTE
"Chief Complaint   Patient presents with     Results     MRI Left Knee       Initial /64  Pulse 74  Temp 97  F (36.1  C) (Tympanic)  Ht 5' 10.5\" (1.791 m)  Wt 220 lb (99.8 kg)  SpO2 98%  BMI 31.12 kg/m2 Estimated body mass index is 31.12 kg/(m^2) as calculated from the following:    Height as of this encounter: 5' 10.5\" (1.791 m).    Weight as of this encounter: 220 lb (99.8 kg).  Medication Reconciliation: complete    Keyanna Villavicencio LPN    "

## 2018-11-14 NOTE — MR AVS SNAPSHOT
After Visit Summary   11/14/2018    Shefali Dailey    MRN: [id number]           Patient Information     Date Of Birth          1955        Visit Information        Provider Department      11/14/2018 11:00 AM Arden Oreilly MD [facility]        Today's Diagnoses     Primary osteoarthritis of left knee    -  1    Baker's cyst, left        Tobacco abuse        Unilateral osteoarthritis of hip, left          Care Instructions    Take sulindac with food twice daily , Physical Therapy if NSAID therapy does not work, call our office and we will make a referral. Interventional radiology can drain cyst and referrals will be made if you choose.          Follow-ups after your visit        Who to contact     If you have questions or need follow up information about today's clinic visit or your schedule please contact [facility] directly at [phone].  Normal or non-critical lab and imaging results will be communicated to you by [employer], letter or phone within 4 business days after the clinic has received the results. If you do not hear from us within 7 days, please contact the clinic through [employer] or phone. If you have a critical or abnormal lab result, we will notify you by phone as soon as possible.  Submit refill requests through [employer] ([employer]) or call your pharmacy and they will forward the refill request to us. Please allow 3 business days for your refill to be completed.          Additional Information About Your Visit        MyChart Information     [employer]) gives you secure access to your electronic health record. If you see a primary care provider, you can also send messages to your care team and make appointments. If you have questions, please call your primary care clinic.  If you do not have a primary care provider, please call [phone] and they will assist you.        Care EveryWhere ID     This is your Care EveryWhere ID. This could be used by  "other organizations to access your [place] medical records  [id number]        Your Vitals Were     Pulse Temperature Height Pulse Oximetry BMI (Body Mass Index)       74 97  F (36.1  C) (Tympanic) 5' 10.5\" (1.791 m) 98% 31.12 kg/m2        Blood Pressure from Last 3 Encounters:   11/14/18 130/64   10/31/18 138/72   07/29/18 134/84    Weight from Last 3 Encounters:   11/14/18 220 lb (99.8 kg)   10/31/18 220 lb (99.8 kg)   07/05/18 220 lb (99.8 kg)              We Performed the Following     Large Joint/Bursa injection and/or drainage (Shoulder, Knee)          Today's Medication Changes          These changes are accurate as of 11/14/18 11:55 AM.  If you have any questions, ask your nurse or doctor.               Stop taking these medicines if you haven't already. Please contact your care team if you have questions.     dexamethasone 4 MG/ML injection   Commonly known as:  DECADRON   Stopped by:  Arden Oreilly MD                    Primary Care Provider Office Phone # Fax #    Yosef Gonzales [phone]       [address] [place] [address]        Equal Access to Services     YOUSUF CHAO AH: Hadii kayla pascual hadasho Soomaali, waaxda luqadaha, qaybta kaalmada adeegyada, salina monsivais. So [facility] [phone].    ATENCIÓN: Si habla español, tiene a peters disposición servicios gratuitos de asistencia lingüística. Llame al [phone].    We comply with applicable federal civil rights laws and [place] laws. We do not discriminate on the basis of race, color, national origin, age, disability, sex, sexual orientation, or gender identity.            Thank you!     Thank you for choosing [facility]  for your care. Our goal is always to provide you with excellent care. Hearing back from our patients is one way we can continue to improve our services. Please take a few minutes to complete the written survey that you may receive in the mail after your " visit with us. Thank you!             Your Updated Medication List - Protect others around you: Learn how to safely use, store and throw away your medicines at www.disposemymeds.org.          This list is accurate as of 11/14/18 11:55 AM.  Always use your most recent med list.                   Brand Name Dispense Instructions for use Diagnosis    IBUPROFEN PO      Take 600 mg by mouth every 8 hours as needed for moderate pain        oxyCODONE-acetaminophen 5-325 MG per tablet    PERCOCET    12 tablet    Take 1-2 tablets by mouth every 6 hours as needed for pain        venlafaxine 75 MG 24 hr capsule    EFFEXOR-XR    90 capsule    Take 1 capsule (75 mg) by mouth daily    Acute reaction to stress

## 2019-01-17 ENCOUNTER — MYC MEDICAL ADVICE (OUTPATIENT)
Dept: FAMILY MEDICINE | Facility: OTHER | Age: 64
End: 2019-01-17

## 2019-01-17 DIAGNOSIS — N39.3 FEMALE STRESS INCONTINENCE: Primary | ICD-10-CM

## 2019-01-17 NOTE — TELEPHONE ENCOUNTER
I think Dr. Camara does a really good job with this and I advise she see him.  Referral sent.  Thanks. Yosef Gonzales

## 2019-02-01 ENCOUNTER — OFFICE VISIT (OUTPATIENT)
Dept: OBGYN | Facility: OTHER | Age: 64
End: 2019-02-01
Attending: OBSTETRICS & GYNECOLOGY
Payer: COMMERCIAL

## 2019-02-01 VITALS
WEIGHT: 210 LBS | DIASTOLIC BLOOD PRESSURE: 78 MMHG | OXYGEN SATURATION: 94 % | SYSTOLIC BLOOD PRESSURE: 130 MMHG | HEIGHT: 71 IN | HEART RATE: 67 BPM | BODY MASS INDEX: 29.4 KG/M2

## 2019-02-01 DIAGNOSIS — N39.41 URGE INCONTINENCE OF URINE: Primary | ICD-10-CM

## 2019-02-01 DIAGNOSIS — Z72.0 TOBACCO ABUSE: ICD-10-CM

## 2019-02-01 DIAGNOSIS — Z71.6 TOBACCO ABUSE COUNSELING: ICD-10-CM

## 2019-02-01 DIAGNOSIS — N39.46 URINARY INCONTINENCE, MIXED: ICD-10-CM

## 2019-02-01 PROCEDURE — 99203 OFFICE O/P NEW LOW 30 MIN: CPT | Performed by: OBSTETRICS & GYNECOLOGY

## 2019-02-01 RX ORDER — TOLTERODINE 4 MG/1
4 CAPSULE, EXTENDED RELEASE ORAL DAILY
Qty: 90 CAPSULE | Refills: 3 | Status: SHIPPED | OUTPATIENT
Start: 2019-02-01 | End: 2020-02-07

## 2019-02-01 ASSESSMENT — MIFFLIN-ST. JEOR: SCORE: 1605.74

## 2019-02-01 ASSESSMENT — PAIN SCALES - GENERAL: PAINLEVEL: MILD PAIN (3)

## 2019-02-01 NOTE — NURSING NOTE
"Chief Complaint   Patient presents with     Consult     Consult/ Gonzales/ urinary incontinence       Initial /78 (BP Location: Left arm, Cuff Size: Adult Large)   Pulse 67   Ht 1.791 m (5' 10.5\")   Wt 95.3 kg (210 lb)   SpO2 94%   BMI 29.71 kg/m   Estimated body mass index is 29.71 kg/m  as calculated from the following:    Height as of this encounter: 1.791 m (5' 10.5\").    Weight as of this encounter: 95.3 kg (210 lb).  Medication Reconciliation: complete    Darlyn Joshi LPN  "

## 2019-02-02 NOTE — PROGRESS NOTES
CC:  Consult from Dr. Petit for urinary incontinence   HPI:  Shefali Dailey is a 63 year old female P2 (vag).   She has had progressive urinary incontinence now interfering with work and ADL's.  She had prior RAJESH/BSO for uterine fibroids.   She has done Kegal exercises without improvement.       Frequency: Yes  q 2 hours  Urgency:  Yes  Stress:  Yes  Nocturia:  Yes, 1-2  Previous work-up:Yes, Negative UA at Dr. Wilcox office.   Contraception: NA  Pelvic pain:No  Dyspareunia: No  Incomplete emptying:  :Does not know  Pelvic pressure:  No  Dysuria: No  Bulging:  No  Splinting: No  Constipation:  No    Past GYN history:        Last PAP smear:  Normal  Hysterectomy: Yes    Patients records are available and reviewed at today's visit.    Past Medical History:   Diagnosis Date     Malignant neoplasm of breast (female), unspecified 11/28/2005       Past Surgical History:   Procedure Laterality Date     APPENDECTOMY  2004     ARTHROSCOPY KNEE Left 4/9/2015    Procedure: ARTHROSCOPY KNEE;  Surgeon: Arden Oreilly MD;  Location: [facility] OR     bilateral mastectomy      Bilateral, lymph nodes removed from left side     breast reconstruction  6/2006    Bilateral, expansion with saline implants     colonoscopy with biopsy  2012     DILATION AND CURETTAGE      D&C, multiple     EGD with biopsy  2012     HYSTERECTOMY TOTAL ABDOMINAL, BILATERAL SALPINGO-OOPHORECTOMY, COMBINED Bilateral 01/01/2004     needle loc breast biopsy  2005    LT     TONSILLECTOMY  1961       Family History   Problem Relation Age of Onset     Diabetes Mother      Myocardial Infarction Mother 72        myocardial infarction, cause of death     Other - See Comments Father 63        liver cirrhosis     Cerebrovascular Disease Father 64        cva, cause of death     Breast Cancer Daughter      Other - See Comments Sister         cholelithiasis     Other - See Comments Sister      Diabetes Sister         IDDM     Cancer - colorectal Sister         59      "Diabetes Sister         diet controlled     Other - See Comments Sister         yamileth cirrhosis, stage 4       Current Outpatient Medications   Medication Sig Dispense Refill     IBUPROFEN PO Take 600 mg by mouth every 8 hours as needed for moderate pain       tolterodine ER (DETROL LA) 4 MG 24 hr capsule Take 1 capsule (4 mg) by mouth daily 90 capsule 3     venlafaxine (EFFEXOR-XR) 75 MG 24 hr capsule Take 1 capsule (75 mg) by mouth daily 90 capsule 3     oxyCODONE-acetaminophen (PERCOCET) 5-325 MG per tablet Take 1-2 tablets by mouth every 6 hours as needed for pain (Patient not taking: Reported on 10/31/2018) 12 tablet 0       Allergies: Benoxinate hcl; Fluorescein sodium; and Fluorescein-benoxinate    ROS:  CONSTITUTIONAL: NEGATIVE for fever, chills, change in weight.  GI: NEGATIVE except for above  : As Above  PSYCHIATRIC: NEGATIVE for changes in mood or affect    EXAM:  Blood pressure 130/78, pulse 67, height 1.791 m (5' 10.5\"), weight 95.3 kg (210 lb), SpO2 94 %, not currently breastfeeding.   BMI= Body mass index is 29.71 kg/m .  General - pleasant female in no acute distress.  Abdomen - soft, nontender, nondistended, no hepatosplenomegaly.  Pelvic - EG: normal adult female, BUS: within normal limits, Vagina: atophic, no discharge, Pelvis: no masses or tenderness.  Prolapse:  [place] 0, cystocele 0.5, rectocele 0   Urethral hypermobility:  Yes    Rectovaginal - deferred.  Musculoskeletal - no gross deformities or edema  Neurological - normal mental status.    Supine stress test:  equivocal      ASSESSMENT/PLAN:  Mixed incontinence of urine  (primary encounter diagnosis)  Comment: Predominant urge  Plan: tolterodine ER (DETROL LA) 4 MG 24 hr capsule         Discussed expectant, PT, medical, and surgical options with pt.  Risks and benefits of each.  Counseled on behavioral modification (caffeine avoidance, smoking cessation). Pt may be a sling candidate for stress UI.   Pt desires least invasive approach for " initial treatment and willing to dry medication for OAB.   Detrol Rx.  SE's discussed.   Patient has my card and phone number if questions or continued problems.  30 minutes were spent with the patient with greater than 50% of the visit spent in face-to-face counseling and coordination of care.

## 2019-03-09 ENCOUNTER — APPOINTMENT (OUTPATIENT)
Dept: GENERAL RADIOLOGY | Facility: HOSPITAL | Age: 64
End: 2019-03-09
Attending: FAMILY MEDICINE
Payer: COMMERCIAL

## 2019-03-09 ENCOUNTER — HOSPITAL ENCOUNTER (EMERGENCY)
Facility: HOSPITAL | Age: 64
Discharge: HOME | End: 2019-03-09
Attending: FAMILY MEDICINE | Admitting: FAMILY MEDICINE
Payer: COMMERCIAL

## 2019-03-09 VITALS
SYSTOLIC BLOOD PRESSURE: 132 MMHG | RESPIRATION RATE: 19 BRPM | TEMPERATURE: 98.4 F | HEART RATE: 70 BPM | OXYGEN SATURATION: 96 % | DIASTOLIC BLOOD PRESSURE: 84 MMHG

## 2019-03-09 DIAGNOSIS — K52.9 GASTROENTERITIS: ICD-10-CM

## 2019-03-09 LAB
ALBUMIN SERPL-MCNC: 4.1 G/DL (ref 3.4–5)
ALP SERPL-CCNC: 86 U/L (ref 40–150)
ALT SERPL W P-5'-P-CCNC: 30 U/L (ref 0–50)
ANION GAP SERPL CALCULATED.3IONS-SCNC: 7 MMOL/L (ref 3–14)
AST SERPL W P-5'-P-CCNC: 22 U/L (ref 0–45)
BASOPHILS # BLD AUTO: 0 10E9/L (ref 0–0.2)
BASOPHILS NFR BLD AUTO: 0.2 %
BILIRUB SERPL-MCNC: 0.3 MG/DL (ref 0.2–1.3)
BUN SERPL-MCNC: 19 MG/DL (ref 7–30)
C DIFF TOX B STL QL: NEGATIVE
CALCIUM SERPL-MCNC: 8.9 MG/DL (ref 8.5–10.1)
CHLORIDE SERPL-SCNC: 110 MMOL/L (ref 94–109)
CO2 SERPL-SCNC: 24 MMOL/L (ref 20–32)
CREAT SERPL-MCNC: 0.68 MG/DL (ref 0.52–1.04)
CRP SERPL-MCNC: 7.7 MG/L (ref 0–8)
DIFFERENTIAL METHOD BLD: ABNORMAL
EOSINOPHIL # BLD AUTO: 0.1 10E9/L (ref 0–0.7)
EOSINOPHIL NFR BLD AUTO: 1.2 %
ERYTHROCYTE [DISTWIDTH] IN BLOOD BY AUTOMATED COUNT: 13.6 % (ref 10–15)
GFR SERPL CREATININE-BSD FRML MDRD: >90 ML/MIN/{1.73_M2}
GLUCOSE SERPL-MCNC: 123 MG/DL (ref 70–99)
HCT VFR BLD AUTO: 46.6 % (ref 35–47)
HEMOCCULT STL QL IA: NEGATIVE
HGB BLD-MCNC: 15.7 G/DL (ref 11.7–15.7)
IMM GRANULOCYTES # BLD: 0 10E9/L (ref 0–0.4)
IMM GRANULOCYTES NFR BLD: 0.2 %
LACTOFERRIN STL QL IA: POSITIVE
LYMPHOCYTES # BLD AUTO: 1.2 10E9/L (ref 0.8–5.3)
LYMPHOCYTES NFR BLD AUTO: 24.4 %
MCH RBC QN AUTO: 29.9 PG (ref 26.5–33)
MCHC RBC AUTO-ENTMCNC: 33.7 G/DL (ref 31.5–36.5)
MCV RBC AUTO: 89 FL (ref 78–100)
MONOCYTES # BLD AUTO: 0.5 10E9/L (ref 0–1.3)
MONOCYTES NFR BLD AUTO: 9.4 %
NEUTROPHILS # BLD AUTO: 3.3 10E9/L (ref 1.6–8.3)
NEUTROPHILS NFR BLD AUTO: 64.6 %
NRBC # BLD AUTO: 0 10*3/UL
NRBC BLD AUTO-RTO: 0 /100
PLATELET # BLD AUTO: 196 10E9/L (ref 150–450)
POTASSIUM SERPL-SCNC: 3.6 MMOL/L (ref 3.4–5.3)
PROT SERPL-MCNC: 7.6 G/DL (ref 6.8–8.8)
RBC # BLD AUTO: 5.25 10E12/L (ref 3.8–5.2)
SODIUM SERPL-SCNC: 141 MMOL/L (ref 133–144)
SPECIMEN SOURCE: NORMAL
WBC # BLD AUTO: 5.1 10E9/L (ref 4–11)

## 2019-03-09 PROCEDURE — 80053 COMPREHEN METABOLIC PANEL: CPT | Performed by: FAMILY MEDICINE

## 2019-03-09 PROCEDURE — 87046 STOOL CULTR AEROBIC BACT EA: CPT | Performed by: FAMILY MEDICINE

## 2019-03-09 PROCEDURE — 36415 COLL VENOUS BLD VENIPUNCTURE: CPT | Performed by: FAMILY MEDICINE

## 2019-03-09 PROCEDURE — 83630 LACTOFERRIN FECAL (QUAL): CPT | Performed by: FAMILY MEDICINE

## 2019-03-09 PROCEDURE — 85025 COMPLETE CBC W/AUTO DIFF WBC: CPT | Performed by: FAMILY MEDICINE

## 2019-03-09 PROCEDURE — 96374 THER/PROPH/DIAG INJ IV PUSH: CPT

## 2019-03-09 PROCEDURE — 87015 SPECIMEN INFECT AGNT CONCNTJ: CPT | Performed by: FAMILY MEDICINE

## 2019-03-09 PROCEDURE — 87493 C DIFF AMPLIFIED PROBE: CPT | Performed by: FAMILY MEDICINE

## 2019-03-09 PROCEDURE — 86140 C-REACTIVE PROTEIN: CPT | Performed by: FAMILY MEDICINE

## 2019-03-09 PROCEDURE — 25800030 ZZH RX IP 258 OP 636: Performed by: FAMILY MEDICINE

## 2019-03-09 PROCEDURE — 99284 EMERGENCY DEPT VISIT MOD MDM: CPT | Mod: 25

## 2019-03-09 PROCEDURE — 99284 EMERGENCY DEPT VISIT MOD MDM: CPT | Mod: Z6 | Performed by: FAMILY MEDICINE

## 2019-03-09 PROCEDURE — 25000128 H RX IP 250 OP 636: Performed by: FAMILY MEDICINE

## 2019-03-09 PROCEDURE — 87899 AGENT NOS ASSAY W/OPTIC: CPT | Performed by: FAMILY MEDICINE

## 2019-03-09 PROCEDURE — 87045 FECES CULTURE AEROBIC BACT: CPT | Performed by: FAMILY MEDICINE

## 2019-03-09 PROCEDURE — 82274 ASSAY TEST FOR BLOOD FECAL: CPT | Performed by: FAMILY MEDICINE

## 2019-03-09 PROCEDURE — 74019 RADEX ABDOMEN 2 VIEWS: CPT | Mod: TC

## 2019-03-09 PROCEDURE — 96361 HYDRATE IV INFUSION ADD-ON: CPT

## 2019-03-09 RX ORDER — ONDANSETRON 4 MG/1
4 TABLET, ORALLY DISINTEGRATING ORAL EVERY 8 HOURS PRN
Qty: 10 TABLET | Refills: 0 | Status: SHIPPED | OUTPATIENT
Start: 2019-03-09 | End: 2019-09-03

## 2019-03-09 RX ORDER — SODIUM CHLORIDE, SODIUM LACTATE, POTASSIUM CHLORIDE, CALCIUM CHLORIDE 600; 310; 30; 20 MG/100ML; MG/100ML; MG/100ML; MG/100ML
1000 INJECTION, SOLUTION INTRAVENOUS CONTINUOUS
Status: DISCONTINUED | OUTPATIENT
Start: 2019-03-09 | End: 2019-03-09 | Stop reason: HOSPADM

## 2019-03-09 RX ORDER — ONDANSETRON 2 MG/ML
4 INJECTION INTRAMUSCULAR; INTRAVENOUS
Status: COMPLETED | OUTPATIENT
Start: 2019-03-09 | End: 2019-03-09

## 2019-03-09 RX ORDER — CIPROFLOXACIN 500 MG/1
500 TABLET, FILM COATED ORAL 2 TIMES DAILY
Qty: 14 TABLET | Refills: 0 | Status: SHIPPED | OUTPATIENT
Start: 2019-03-09 | End: 2019-09-03

## 2019-03-09 RX ADMIN — SODIUM CHLORIDE, POTASSIUM CHLORIDE, SODIUM LACTATE AND CALCIUM CHLORIDE 1000 ML: 600; 310; 30; 20 INJECTION, SOLUTION INTRAVENOUS at 15:16

## 2019-03-09 RX ADMIN — ONDANSETRON 4 MG: 2 INJECTION INTRAMUSCULAR; INTRAVENOUS at 13:57

## 2019-03-09 RX ADMIN — SODIUM CHLORIDE, POTASSIUM CHLORIDE, SODIUM LACTATE AND CALCIUM CHLORIDE 1000 ML: 600; 310; 30; 20 INJECTION, SOLUTION INTRAVENOUS at 13:55

## 2019-03-09 ASSESSMENT — ENCOUNTER SYMPTOMS
DIAPHORESIS: 1
BACK PAIN: 0
DIARRHEA: 1
NECK PAIN: 0
HEADACHES: 0
PSYCHIATRIC NEGATIVE: 1
ABDOMINAL PAIN: 1
ACTIVITY CHANGE: 1
CONSTIPATION: 0
PALPITATIONS: 0
SHORTNESS OF BREATH: 0
DYSURIA: 0
NAUSEA: 1
VOMITING: 1
ABDOMINAL DISTENTION: 1

## 2019-03-09 NOTE — LETTER
[facility]  [address]  [place] [address]  Phone: [phone]    March 9, 2019        Shefali Dailey  [address]  [place] [address]          To whom it may concern:    RE: Shefali Todddy DESIRAE Dailey was seen in the emergency room and should not return to work for 2 days.      Please contact me for questions or concerns.      Sincerely,        Sabine Llanes MD

## 2019-03-09 NOTE — ED AVS SNAPSHOT
[facility]  [address] [address]  Phone:  [phone]                                    Shefali Dailey   MRN: [id number]    Department:  [facility]   Date of Visit:  3/9/2019           After Visit Summary Signature Page    I have received my discharge instructions, and my questions have been answered. I have discussed any challenges I see with this plan with the nurse or doctor.    ..........................................................................................................................................  Patient/Patient Representative Signature      ..........................................................................................................................................  Patient Representative Print Name and Relationship to Patient    ..................................................               ................................................  Date                                   Time    ..........................................................................................................................................  Reviewed by Signature/Title    ...................................................              ..............................................  Date                                               Time          22EPIC Rev 08/18

## 2019-03-09 NOTE — ED PROVIDER NOTES
History     Chief Complaint   Patient presents with     Abdominal Pain     c/o abd pain off and on and diarrhea x 4 days. notes nausea and dizziness     HPI  Shefali Dailey is a 63 year old female who has had abdominal pain on and off along with diarrhea for 4 days.  She states she is also nauseated and dizzy, had to call into work on Friday because she was unable to stay out of the bathroom.  States that the diarrhea is watery, foul-smelling and frequent.  She has been trying to drink Gatorade and 7-Up but she says that generally speaking that tends to make a volume of stool larger in her estimation.  She has abdominal pain that is periumbilical, cramping.    Allergies:  Allergies   Allergen Reactions     Benoxinate Hcl Other (See Comments), Itching and Swelling     Burning  Fluress       Fluorescein Sodium Other (See Comments), Itching and Swelling     Burning  Fluress     Fluorescein-Benoxinate Other (See Comments)       Problem List:    Patient Active Problem List    Diagnosis Date Noted     ACP (advance care planning) 02/06/2013     Priority: Medium     Advance Care Planning 5/3/2017: ACP Review of Chart / Resources Provided:  Reviewed chart for advance care plan.  Shefali Dailey has been provided information and resources to begin or update their advance care plan.  Added by Shania Cobos                Past Medical History:    Past Medical History:   Diagnosis Date     Malignant neoplasm of breast (female), unspecified 11/28/2005       Past Surgical History:    Past Surgical History:   Procedure Laterality Date     APPENDECTOMY  2004     ARTHROSCOPY KNEE Left 4/9/2015    Procedure: ARTHROSCOPY KNEE;  Surgeon: Arden Oreilly MD;  Location: [facility]     bilateral mastectomy      Bilateral, lymph nodes removed from left side     breast reconstruction  6/2006    Bilateral, expansion with saline implants     colonoscopy with biopsy  2012     DILATION AND CURETTAGE      D&C, multiple     EGD with biopsy   2012     HYSTERECTOMY TOTAL ABDOMINAL, BILATERAL SALPINGO-OOPHORECTOMY, COMBINED Bilateral 01/01/2004     needle loc breast biopsy  2005    LT     TONSILLECTOMY  1961       Family History:    Family History   Problem Relation Age of Onset     Diabetes Mother      Myocardial Infarction Mother 72        myocardial infarction, cause of death     Other - See Comments Father 63        liver cirrhosis     Cerebrovascular Disease Father 64        cva, cause of death     Breast Cancer Daughter      Other - See Comments Sister         cholelithiasis     Other - See Comments Sister      Diabetes Sister         IDDM     Cancer - colorectal Sister         59     Diabetes Sister         diet controlled     Other - See Comments Sister         billary cirrhosis, stage 4       Social History:  Marital Status:   [4]  Social History     Tobacco Use     Smoking status: Current Every Day Smoker     Packs/day: 0.50     Years: 34.00     Pack years: 17.00     Types: Cigarettes     Smokeless tobacco: Never Used     Tobacco comment: pt enrolled in quit plan 10/31/18   Substance Use Topics     Alcohol use: Yes     Alcohol/week: 0.0 oz     Comment: rarely     Drug use: No        Medications:      ciprofloxacin (CIPRO) 500 MG tablet   ondansetron (ZOFRAN ODT) 4 MG ODT tab   tolterodine ER (DETROL LA) 4 MG 24 hr capsule   venlafaxine (EFFEXOR-XR) 75 MG 24 hr capsule   IBUPROFEN PO         Review of Systems   Constitutional: Positive for activity change and diaphoresis.   HENT: Negative.    Respiratory: Negative for shortness of breath.    Cardiovascular: Negative for chest pain and palpitations.   Gastrointestinal: Positive for abdominal distention, abdominal pain, diarrhea, nausea and vomiting. Negative for constipation.   Genitourinary: Negative for dysuria.   Musculoskeletal: Negative for back pain and neck pain.   Neurological: Negative for headaches.   Psychiatric/Behavioral: Negative.        Physical Exam   BP: 147/92  Heart Rate:  85  Temp: 97.8  F (36.6  C)  Resp: 16  SpO2: 98 %      Physical Exam   Constitutional: She is oriented to person, place, and time. She appears well-developed and well-nourished.   HENT:   Head: Normocephalic and atraumatic.   Neck: Normal range of motion. Neck supple.   Cardiovascular: Normal rate, regular rhythm and normal heart sounds.   No murmur heard.  Pulmonary/Chest: Effort normal and breath sounds normal. No respiratory distress.   Abdominal: Soft. Bowel sounds are normal. She exhibits no distension. There is tenderness. There is no guarding.   Musculoskeletal: Normal range of motion. She exhibits no edema.   Neurological: She is alert and oriented to person, place, and time.   Skin: Skin is warm and dry. Capillary refill takes less than 2 seconds.   Psychiatric: She has a normal mood and affect.   Nursing note and vitals reviewed.      ED Course        Procedures    Results for orders placed or performed during the hospital encounter of 03/09/19 (from the past 24 hour(s))   CBC with platelets differential   Result Value Ref Range    WBC 5.1 4.0 - 11.0 10e9/L    RBC Count 5.25 (H) 3.8 - 5.2 10e12/L    Hemoglobin 15.7 11.7 - 15.7 g/dL    Hematocrit 46.6 35.0 - 47.0 %    MCV 89 78 - 100 fl    MCH 29.9 26.5 - 33.0 pg    MCHC 33.7 31.5 - 36.5 g/dL    RDW 13.6 10.0 - 15.0 %    Platelet Count 196 150 - 450 10e9/L    Diff Method Automated Method     % Neutrophils 64.6 %    % Lymphocytes 24.4 %    % Monocytes 9.4 %    % Eosinophils 1.2 %    % Basophils 0.2 %    % Immature Granulocytes 0.2 %    Nucleated RBCs 0 0 /100    Absolute Neutrophil 3.3 1.6 - 8.3 10e9/L    Absolute Lymphocytes 1.2 0.8 - 5.3 10e9/L    Absolute Monocytes 0.5 0.0 - 1.3 10e9/L    Absolute Eosinophils 0.1 0.0 - 0.7 10e9/L    Absolute Basophils 0.0 0.0 - 0.2 10e9/L    Abs Immature Granulocytes 0.0 0 - 0.4 10e9/L    Absolute Nucleated RBC 0.0    Comprehensive metabolic panel   Result Value Ref Range    Sodium 141 133 - 144 mmol/L    Potassium 3.6  3.4 - 5.3 mmol/L    Chloride 110 (H) 94 - 109 mmol/L    Carbon Dioxide 24 20 - 32 mmol/L    Anion Gap 7 3 - 14 mmol/L    Glucose 123 (H) 70 - 99 mg/dL    Urea Nitrogen 19 7 - 30 mg/dL    Creatinine 0.68 0.52 - 1.04 mg/dL    GFR Estimate >90 >60 mL/min/[1.73_m2]    GFR Estimate If Black >90 >60 mL/min/[1.73_m2]    Calcium 8.9 8.5 - 10.1 mg/dL    Bilirubin Total 0.3 0.2 - 1.3 mg/dL    Albumin 4.1 3.4 - 5.0 g/dL    Protein Total 7.6 6.8 - 8.8 g/dL    Alkaline Phosphatase 86 40 - 150 U/L    ALT 30 0 - 50 U/L    AST 22 0 - 45 U/L   CRP inflammation   Result Value Ref Range    CRP Inflammation 7.7 0.0 - 8.0 mg/L   Occult blood fecal HGB immuno   Result Value Ref Range    Occult Blood HGB FIT Negative NEG^Negative   Fecal Lactoferrin   Result Value Ref Range    Fecal Lactoferrin Positive (A) NEG^Negative   Clostridium difficile toxin B PCR   Result Value Ref Range    Specimen Description Feces     C Diff Toxin B PCR Negative NEG^Negative       Medications   lactated ringers BOLUS 1,000 mL (0 mLs Intravenous Stopped 3/9/19 1622)     Followed by   lactated ringers BOLUS 1,000 mL (0 mLs Intravenous Stopped 3/9/19 1512)     Followed by   lactated ringers infusion (not administered)   ondansetron (ZOFRAN) injection 4 mg (4 mg Intravenous Given 3/9/19 1357)       Assessments & Plan (with Medical Decision Making)   Patient has positive fecal lactoferrin, and negative Clostridium difficile.  Stool cultures pending.  Discussed this with patient, she is aware that she may need to stop antibiotics in the future if the culture comes back negative. This may also be a viral illness in that she has a normal white count.  Will place the patient on Cipro at this time, have her off work until Tuesday, we will follow have her follow-up with primary care on Tuesday when culture should be available.    I have reviewed the nursing notes.    I have reviewed the findings, diagnosis, plan and need for follow up with the patient.     Medication  List      Started    ciprofloxacin 500 MG tablet  Commonly known as:  CIPRO  500 mg, Oral, 2 TIMES DAILY     ondansetron 4 MG ODT tab  Commonly known as:  ZOFRAN ODT  4 mg, Oral, EVERY 8 HOURS PRN            Final diagnoses:   Gastroenteritis       3/9/2019   [facility]     Sabine Garcia MD  03/09/19 9575

## 2019-03-10 NOTE — ED NOTES
Discharge instructions given. Verbalized understanding. States pain is the same and rates 3/10. Ambulated out of ED independently with family at side.

## 2019-03-10 NOTE — RESULT ENCOUNTER NOTE
Stool culture still pending.  Suha Crenshaw Certified  Physician Assistant  3/10/2019  4:34 PM  [facility]

## 2019-03-12 LAB
BACTERIA SPEC CULT: NORMAL
E COLI SXT1+2 STL IA: NORMAL
SPECIMEN SOURCE: NORMAL

## 2019-03-13 ENCOUNTER — OFFICE VISIT (OUTPATIENT)
Dept: FAMILY MEDICINE | Facility: OTHER | Age: 64
End: 2019-03-13
Attending: FAMILY MEDICINE
Payer: COMMERCIAL

## 2019-03-13 VITALS
TEMPERATURE: 98 F | DIASTOLIC BLOOD PRESSURE: 58 MMHG | OXYGEN SATURATION: 96 % | HEART RATE: 80 BPM | WEIGHT: 227 LBS | SYSTOLIC BLOOD PRESSURE: 130 MMHG | BODY MASS INDEX: 32.11 KG/M2

## 2019-03-13 DIAGNOSIS — E86.0 DEHYDRATION: ICD-10-CM

## 2019-03-13 DIAGNOSIS — Z78.9 IMMUNE TO HEPATITIS B: ICD-10-CM

## 2019-03-13 DIAGNOSIS — F43.0 ACUTE REACTION TO STRESS: ICD-10-CM

## 2019-03-13 DIAGNOSIS — A08.4 VIRAL GASTROENTERITIS: Primary | ICD-10-CM

## 2019-03-13 PROCEDURE — 99214 OFFICE O/P EST MOD 30 MIN: CPT | Performed by: FAMILY MEDICINE

## 2019-03-13 RX ORDER — VENLAFAXINE HYDROCHLORIDE 75 MG/1
75 CAPSULE, EXTENDED RELEASE ORAL DAILY
Qty: 90 CAPSULE | Refills: 3 | Status: SHIPPED | OUTPATIENT
Start: 2019-03-13 | End: 2020-03-20

## 2019-03-13 ASSESSMENT — PAIN SCALES - GENERAL: PAINLEVEL: MODERATE PAIN (5)

## 2019-03-13 NOTE — NURSING NOTE
"Chief Complaint   Patient presents with     UC Follow-Up       Initial /58   Pulse 80   Temp 98  F (36.7  C) (Tympanic)   Wt 103 kg (227 lb)   SpO2 96%   BMI 32.11 kg/m   Estimated body mass index is 32.11 kg/m  as calculated from the following:    Height as of 2/1/19: 1.791 m (5' 10.5\").    Weight as of this encounter: 103 kg (227 lb).  Medication Reconciliation: complete    Justa Jara MA    "

## 2019-03-13 NOTE — PROGRESS NOTES
SUBJECTIVE:                                                    Shefali Dailey is a 63 year old female who presents to clinic today for the following health issues:      ED/UC Followup:    Facility:  [facility]  Date of visit: 3/9/19  Reason for visit: gastroenteritis  Current Status: feeling much better. Would like to discuss the cultures from UC.         PROBLEMS TO ADD ON...    Problem list and histories reviewed & adjusted, as indicated.  Additional history: here for f/u.  Doing better.  Had VGE.  Reviewed labs.  F/u stress.  Discussed effexor.  It is stable but she isn't doing great.  Talked about adjusting the med, etc.  See below.     Patient Active Problem List   Diagnosis     ACP (advance care planning)     Immune to hepatitis B     Past Surgical History:   Procedure Laterality Date     APPENDECTOMY  2004     ARTHROSCOPY KNEE Left 4/9/2015    Procedure: ARTHROSCOPY KNEE;  Surgeon: Arden Oreilly MD;  Location: [facility]     bilateral mastectomy      Bilateral, lymph nodes removed from left side     breast reconstruction  6/2006    Bilateral, expansion with saline implants     colonoscopy with biopsy  2012     DILATION AND CURETTAGE      D&C, multiple     EGD with biopsy  2012     HYSTERECTOMY TOTAL ABDOMINAL, BILATERAL SALPINGO-OOPHORECTOMY, COMBINED Bilateral 01/01/2004     needle loc breast biopsy  2005    LT     TONSILLECTOMY  1961       Social History     Tobacco Use     Smoking status: Current Every Day Smoker     Packs/day: 0.50     Years: 44.00     Pack years: 22.00     Types: Cigarettes     Start date: 1/1/1976     Smokeless tobacco: Never Used   Substance Use Topics     Alcohol use: Yes     Alcohol/week: 0.0 oz     Comment: rarely     Family History   Problem Relation Age of Onset     Diabetes Mother      Myocardial Infarction Mother 72        myocardial infarction, cause of death     Other - See Comments Father 63        liver cirrhosis     Cerebrovascular Disease Father 64        cva, cause of death      Breast Cancer Daughter      Other - See Comments Sister         cholelithiasis     Other - See Comments Sister      Diabetes Sister         IDDM     Cancer - colorectal Sister         59     Diabetes Sister         diet controlled     Other - See Comments Sister         billpili cirrhosis, stage 4         Current Outpatient Medications   Medication Sig Dispense Refill     ciprofloxacin (CIPRO) 500 MG tablet Take 1 tablet (500 mg) by mouth 2 times daily for 7 days 14 tablet 0     IBUPROFEN PO Take 600 mg by mouth every 8 hours as needed for moderate pain       tolterodine ER (DETROL LA) 4 MG 24 hr capsule Take 1 capsule (4 mg) by mouth daily 90 capsule 3     venlafaxine (EFFEXOR-XR) 75 MG 24 hr capsule Take 1 capsule (75 mg) by mouth daily 90 capsule 3     Allergies   Allergen Reactions     Benoxinate Hcl Other (See Comments), Itching and Swelling     Burning  Fluress       Fluorescein Sodium Other (See Comments), Itching and Swelling     Burning  Fluress     Fluorescein-Benoxinate Other (See Comments)       ROS:  Constitutional, HEENT, cardiovascular, pulmonary, gi and gu systems are negative, except as otherwise noted.    OBJECTIVE:                                                    /58   Pulse 80   Temp 98  F (36.7  C) (Tympanic)   Wt 103 kg (227 lb)   SpO2 96%   BMI 32.11 kg/m    Body mass index is 32.11 kg/m .  GENERAL APPEARANCE: Alert, no acute distress  CV: regular rate and rhythm, no murmur, rub or gallop  RESP: lungs clear to auscultation bilaterally  ABDOMEN: normal bowel sounds, soft, nontender, no hepatosplenomegaly or other masses  SKIN: no suspicious lesions or rashes to visualized skin  NEURO: Alert, oriented x 3, speech and mentation normal      Labs reviewed in detail.      ASSESSMENT/PLAN:                                                    1. Viral gastroenteritis  Resolved.     2. Dehydration  Resolved.     3. Acute reaction to stress  Stable.  Continue effexor.  Talked about  increase.  Talked about counseling.  For now stay the course.  She will let me know if she needs something else.    - venlafaxine (EFFEXOR-XR) 75 MG 24 hr capsule; Take 1 capsule (75 mg) by mouth daily  Dispense: 90 capsule; Refill: 3    4. Immune to hepatitis B  Discussed.  She is immune.  She feels her immunity waned.  She works in surgery.  I told her we should check in a couple of years the titer again.  She is pleased.            Yosef Gonzales MD  [facility]

## 2019-04-09 NOTE — ED NOTES
Pt presents today for c/o right sided lymph node pain/neck/ear pain. No fevers as of yet. Significant other is starting chemo tomorrow and wants to not pass anything on.   
CONSTITUTIONAL: No fevers, no chills, no lightheadedness, no dizziness  Eyes: no visual changes  Ears: no ear drainage, no ear pain  Nose: no nasal congestion  Mouth/Throat: no sore throat  CV: see hpi  PULM: see hpi  GI: No n/v/d, no abd pain  : no dysuria, no hematuria  SKIN: no rashes.  NEURO: no headache, no focal weakness or numbness  PSYCHIATRIC: no known mental health issues.

## 2019-05-05 ENCOUNTER — MYC MEDICAL ADVICE (OUTPATIENT)
Dept: FAMILY MEDICINE | Facility: OTHER | Age: 64
End: 2019-05-05

## 2019-07-18 ENCOUNTER — TRANSFERRED RECORDS (OUTPATIENT)
Dept: HEALTH INFORMATION MANAGEMENT | Facility: CLINIC | Age: 64
End: 2019-07-18

## 2019-08-30 NOTE — PATIENT INSTRUCTIONS
Before Your Surgery      Call your surgeon if there is any change in your health. This includes signs of a cold or flu (such as a sore throat, runny nose, cough, rash or fever).    Do not smoke, drink alcohol or take over the counter medicine (unless your surgeon or primary care doctor tells you to) for the 24 hours before and after surgery.    If you take prescribed drugs: Follow your doctor s orders about which medicines to take and which to stop until after surgery.    Eating and drinking prior to surgery: follow the instructions from your surgeon    Take a shower or bath the night before surgery. Use the soap your surgeon gave you to gently clean your skin. If you do not have soap from your surgeon, use your regular soap. Do not shave or scrub the surgery site.  Wear clean pajamas and have clean sheets on your bed.   HOW TO QUIT SMOKING  Smoking is one of the hardest habits to break. About half of all those who have ever smoked have been able to quit, and most of those (about 70%) who still smoke want to quit. Here are some of the best ways to stop smoking.     KEEP TRYING:  It takes most smokers about 8 tries before they are finally able to fully quit. So, the more often you try and fail, the better your chance of quitting the next time! So, don't give up!    GO COLD TURKEY:  Most ex-smokers quit cold turkey. Trying to cut back gradually doesn't seem to work as well, perhaps because it continues the smoking habit. Also, it is possible to fool yourself by inhaling more while smoking fewer cigarettes. This results in the same amount of nicotine in your body!    GET SUPPORT:  Support programs can make an important difference, especially for the heavy smoker. These groups offer lectures, methods to change your behavior and peer support. Call the free national Quitline for more information. 800-QUIT-NOW (927-647-6205). Low-cost or free programs are offered by many hospitals, local chapters of the American Lung  Association (810-255-8548) and the American Cancer Society (628-284-1735). Support at home is important too. Non-smokers can help by offering praise and encouragement. If the smoker fails to quit, encourage them to try again!    OVER-THE-COUNTER MEDICINES:  For those who can't quit on their own, Nicotine Replacement Therapy (NRT) may make quitting much easier. Certain aids such as the nicotine patch, gum and lozenge are available without a prescription. However, it is best to use these under the guidance of your doctor. The skin patch provides a steady supply of nicotine to the body. Nicotine gum and lozenge gives temporary bursts of low levels of nicotine. Both methods take the edge off the craving for cigarettes. WARNING: If you feel symptoms of nicotine overdose, such as nausea, vomiting, dizziness, weakness, or fast heartbeat, stop using these and see your doctor.    PRESCRIPTION MEDICINES:  After evaluating your smoking patterns and prior attempts at quitting, your doctor may offer a prescription medicine such as bupropion (Zyban, Wellbutrin), varenicline (Chantix, Champix), a niocotine inhaler or nasal spray. Each has its unique advantage and side effects which your doctor can review with you.    HEALTH BENEFITS OF QUITTING:  The benefits of quitting start right away and keep improving the longer you go without smokin minutes: blood pressure and pulse return to normal  8 hours: oxygen levels return to normal  2 days: ability to smell and taste begins to improve as damaged nerves start to regrow  2-3 weeks: circulation and lung function improves  1-9 months: decreased cough, congestion and shortness of breath; less tired  1 year: risk of heart attack decreases by half  5 years: risk of lung cancer decreases by half; risk of stroke becomes the same as a non-smoker  For information about how to quit smoking, visit the following links:  National Cancer [place] ,   Clearing the Air, Quit Smoking Today   -  an online booklet. http://www.smokefree.gov/pubs/clearing_the_air.pdf  Smokefree.gov http://smokefree.gov/  QuitNet http://www.quitnet.com/    7324-3838 [employer], [address] [place] [address], [place] [address]. All rights reserved. This information is not intended as a substitute for professional medical care. Always follow your healthcare professional's instructions.      The Benefits of Living Smoke Free  What do you want to gain from quitting? Check off some reasons to quit.  Health Benefits  ___ Reduce my risk of lung cancer, heart disease, chronic lung disease  ___ Have fewer wrinkles and softer skin  ___ Improve my sense of taste and smell  ___ For pregnant women--reduce the risk of having a miscarriage, stillbirth, premature birth, or low-birth-weight baby  Personal Benefits  ___ Feel more in control of my life  ___ Have better-smelling hair, breath, clothes, home, and car  ___ Save time by not having to take smoke breaks, buy cigarettes, or hunt for a light  ___ Have whiter teeth  Family Benefits  ___ Reduce my children s respiratory tract infections  ___ Set a good example for my children  ___ Reduce my family s cancer risk  Financial Benefits  ___ Save hundreds of dollars each year that would be spent on cigarettes  ___ Save money on medical bills  ___ Save on life, health, and car insurance premiums    Those Dollars Add Up!  Cigarettes are expensive, and getting more expensive all the time. Do you realize how much money you are spending on cigarettes per year? What is the average amount you spend on a pack of cigarettes? What is the average number of packs that you smoke per day? Using your answers to these questions, fill in this formula to help you find out:  ($ _____ per pack) ×  ( _____ number of packs per day) × (365 days) =  $ _____ yearly cost of smoking  Besides tobacco, there are other costs, including extra cleaning bills and replacement costs for clothing and furniture; medical expenses for  smoking-related illnesses; and higher health, life, and car insurance premiums.    Cigars and Pipes Count Too!  Cigars and pipes are also dangerous. So are smokeless (chewing) tobacco and snuff. All of these products contain nicotine, a highly addictive substance that has harmful effects on your body. Quitting smoking means giving up all tobacco products.      7999-1471 [employer], [address] [place] [address], [place] [id number]. All rights reserved. This information is not intended as a substitute for professional medical care. Always follow your healthcare professional's instructions.  Before Your Surgery    Call your surgeon if there is any change in your health. This includes signs of a cold or flu (such as a sore throat, runny nose, cough, rash or fever).  Do not smoke, drink alcohol or take over the counter medicine (unless your surgeon or primary care doctor tells you to) for the 24 hours before and after surgery.  If you take prescribed drugs: Follow your doctor s orders about which medicines to take and which to stop until after surgery.  Eating and drinking prior to surgery: follow the instructions from your surgeon  Take a shower or bath the night before surgery. Use the soap your surgeon gave you to gently clean your skin. If you do not have soap from your surgeon, use your regular soap. Do not shave or scrub the surgery site.  Wear clean pajamas and have clean sheets on your bed.

## 2019-08-30 NOTE — H&P (VIEW-ONLY)
[address]  [facility] [place] [address]  [phone]  Dept: [phone]    PRE-OP EVALUATION:  Today's date: 9/3/2019    Shefali Dailey (: 1955) presents for pre-operative evaluation assessment as requested by Dr. Garner.  She requires evaluation and anesthesia risk assessment prior to undergoing surgery/procedure for treatment of knee pain .    Proposed Surgery/ Procedure: left total knee vinny  Date of Surgery/ Procedure: 19  Time of Surgery/ Procedure: [facility]  Hospital/Surgical Facility: [facility] [place]  Fax number for surgical facility: electronically  Primary Physician: Yosef Gonzales  Type of Anesthesia Anticipated: to be determined    Patient has a Health Care Directive or Living Will:  NO    1. NO - Do you have a history of heart attack, stroke, stent, bypass or surgery on an artery in the head, neck, heart or legs?  2. NO - Do you ever have any pain or discomfort in your chest?  3. NO - Do you have a history of  Heart Failure?  4. NO - Are you troubled by shortness of breath when: walking on the level, up a slight hill or at night?  5. NO - Do you currently have a cold, bronchitis or other respiratory infection?  6. NO - Do you have a cough, shortness of breath or wheezing?  7. NO - Do you sometimes get pains in the calves of your legs when you walk?  8. YES - Do you or anyone in your family have previous history of blood clots? Father had passed from a blood clot after liver bypass surgery.   9. NO - Do you or does anyone in your family have a serious bleeding problem such as prolonged bleeding following surgeries or cuts?  10. NO - Have you ever had problems with anemia or been told to take iron pills?  11. NO - Have you had any abnormal blood loss such as black, tarry or bloody stools, or abnormal vaginal bleeding?  12. NO - Have you ever had a blood transfusion?  13. NO - Have you or any of your relatives ever had problems with anesthesia?  14. NO - Do you have sleep  apnea, excessive snoring or daytime drowsiness?  15. NO - Do you have any prosthetic heart valves?  16. NO - Do you have prosthetic joints?  17. NO - Is there any chance that you may be pregnant?      HPI:     HPI related to upcoming procedure: feels fine.  Left knee painful.        See problem list for active medical problems.  Problems all longstanding and stable, except as noted/documented.  See ROS for pertinent symptoms related to these conditions.      MEDICAL HISTORY:     Patient Active Problem List    Diagnosis Date Noted     Immune to hepatitis B 03/13/2019     Priority: Medium     ACP (advance care planning) 02/06/2013     Priority: Medium     Advance Care Planning 5/3/2017: ACP Review of Chart / Resources Provided:  Reviewed chart for advance care plan.  Shefali MERRILL Paulte has been provided information and resources to begin or update their advance care plan.  Added by Shania Cobos              Past Medical History:   Diagnosis Date     Malignant neoplasm of breast (female), unspecified 11/28/2005     Past Surgical History:   Procedure Laterality Date     APPENDECTOMY  2004     ARTHROSCOPY KNEE Left 4/9/2015    Procedure: ARTHROSCOPY KNEE;  Surgeon: Arden Oreilly MD;  Location: [facility]     bilateral mastectomy      Bilateral, lymph nodes removed from left side     breast reconstruction  6/2006    Bilateral, expansion with saline implants     colonoscopy with biopsy  2012     DILATION AND CURETTAGE      D&C, multiple     EGD with biopsy  2012     HYSTERECTOMY TOTAL ABDOMINAL, BILATERAL SALPINGO-OOPHORECTOMY, COMBINED Bilateral 08/26/2004    no cervix, [facility]      needle loc breast biopsy  2005    LT     TONSILLECTOMY  1961     Current Outpatient Medications   Medication Sig Dispense Refill     IBUPROFEN PO Take 600 mg by mouth every 8 hours as needed for moderate pain       tolterodine ER (DETROL LA) 4 MG 24 hr capsule Take 1 capsule (4 mg) by mouth daily 90 capsule 3     venlafaxine  (EFFEXOR-XR) 75 MG 24 hr capsule Take 1 capsule (75 mg) by mouth daily 90 capsule 3     OTC products: None, except as noted above    Allergies   Allergen Reactions     Benoxinate Hcl Other (See Comments), Itching and Swelling     Burning  Fluress       Fluorescein Sodium Other (See Comments), Itching and Swelling     Burning  Fluress     Fluorescein-Benoxinate Other (See Comments)      Latex Allergy: NO    Social History     Tobacco Use     Smoking status: Current Every Day Smoker     Packs/day: 0.50     Years: 44.00     Pack years: 22.00     Types: Cigarettes     Start date: 1/1/1976     Smokeless tobacco: Never Used   Substance Use Topics     Alcohol use: Yes     Alcohol/week: 0.0 oz     Comment: rarely     History   Drug Use No       REVIEW OF SYSTEMS:   CONSTITUTIONAL: NEGATIVE for fever, chills, change in weight  INTEGUMENTARY/SKIN: NEGATIVE for worrisome rashes, moles or lesions  EYES: NEGATIVE for vision changes or irritation  ENT/MOUTH: NEGATIVE for ear, mouth and throat problems  RESP: NEGATIVE for significant cough or SOB  BREAST: NEGATIVE for masses, tenderness or discharge  CV: NEGATIVE for chest pain, palpitations or peripheral edema  GI: NEGATIVE for nausea, abdominal pain, heartburn, or change in bowel habits  : NEGATIVE for frequency, dysuria, or hematuria  MUSCULOSKELETAL:joint stiffness and pain left knee  NEURO: NEGATIVE for weakness, dizziness or paresthesias  ENDOCRINE: NEGATIVE for temperature intolerance, skin/hair changes  HEME: NEGATIVE for bleeding problems  PSYCHIATRIC: NEGATIVE for changes in mood or affect    EXAM:   There were no vitals taken for this visit.    GENERAL APPEARANCE: healthy, alert and no distress     EYES: EOMI, PERRL     HENT: ear canals and TM's normal and nose and mouth without ulcers or lesions     NECK: no adenopathy, no asymmetry, masses, or scars and thyroid normal to palpation     RESP: lungs clear to auscultation - no rales, rhonchi or wheezes     CV: regular  rates and rhythm, normal S1 S2, no S3 or S4 and no murmur, click or rub     ABDOMEN:  soft, nontender, no HSM or masses and bowel sounds normal     MS: extremities normal- no gross deformities noted, no evidence of inflammation in joints, FROM in all extremities.     SKIN: no suspicious lesions or rashes     NEURO: Normal strength and tone, sensory exam grossly normal, mentation intact and speech normal     PSYCH: mentation appears normal. and affect normal/bright     LYMPHATICS: No cervical adenopathy    DIAGNOSTICS:   EKG: appears normal, NSR, normal axis, normal intervals, no acute ST/T changes c/w ischemia, no LVH by voltage criteria, unchanged from previous tracings.  Cbc normal.  Esr normal.  UA normal.  Bmp pending.      Recent Labs   Lab Test 03/09/19  1329 07/29/18  1035   HGB 15.7 15.2    232    140   POTASSIUM 3.6 4.2   CR 0.68 0.60        IMPRESSION:   Reason for surgery/procedure: left knee oa  Diagnosis/reason for consult: upcoming left TKA preop clearance.     The proposed surgical procedure is considered INTERMEDIATE risk.    REVISED CARDIAC RISK INDEX  The patient has the following serious cardiovascular risks for perioperative complications such as (MI, PE, VFib and 3  AV Block):  No serious cardiac risks  INTERPRETATION: 0 risks: Class I (very low risk - 0.4% complication rate)    The patient has the following additional risks for perioperative complications:  No identified additional risks      ICD-10-CM    1. Preop general physical exam Z01.818        RECOMMENDATIONS:     --Consult hospital rounder / IM to assist post-op medical management    --Patient is to take all scheduled medications on the day of surgery EXCEPT for modifications listed below.    APPROVAL GIVEN to proceed with proposed procedure, without further diagnostic evaluation       Signed Electronically by: Yosef Gonzales MD    Copy of this evaluation report is provided to requesting physician.    [facility] Preop  Guidelines    Revised Cardiac Risk Index  [facility]  [address]  [facility] [place] [address]  [phone]  Dept: [phone]

## 2019-08-30 NOTE — PROGRESS NOTES
[address]  [facility] [address]  [phone]  Dept: [phone]    PRE-OP EVALUATION:  Today's date: 9/3/2019    Shefali Dailey (: 1955) presents for pre-operative evaluation assessment as requested by Dr. Garner.  She requires evaluation and anesthesia risk assessment prior to undergoing surgery/procedure for treatment of knee pain .    Proposed Surgery/ Procedure: left total knee vinny  Date of Surgery/ Procedure: 19  Time of Surgery/ Procedure: [facility]  [place] [facility]  [place] number for surgical facility: electronically  Primary Physician: Yosef Gonzales  Type of Anesthesia Anticipated: to be determined    Patient has a Health Care Directive or Living Will:  NO    1. NO - Do you have a history of heart attack, stroke, stent, bypass or surgery on an artery in the head, neck, heart or legs?  2. NO - Do you ever have any pain or discomfort in your chest?  3. NO - Do you have a history of  Heart Failure?  4. NO - Are you troubled by shortness of breath when: walking on the level, up a slight hill or at night?  5. NO - Do you currently have a cold, bronchitis or other respiratory infection?  6. NO - Do you have a cough, shortness of breath or wheezing?  7. NO - Do you sometimes get pains in the calves of your legs when you walk?  8. YES - Do you or anyone in your family have previous history of blood clots? Father had passed from a blood clot after liver bypass surgery.   9. NO - Do you or does anyone in your family have a serious bleeding problem such as prolonged bleeding following surgeries or cuts?  10. NO - Have you ever had problems with anemia or been told to take iron pills?  11. NO - Have you had any abnormal blood loss such as black, tarry or bloody stools, or abnormal vaginal bleeding?  12. NO - Have you ever had a blood transfusion?  13. NO - Have you or any of your relatives ever had problems with anesthesia?  14. NO - Do you have sleep  apnea, excessive snoring or daytime drowsiness?  15. NO - Do you have any prosthetic heart valves?  16. NO - Do you have prosthetic joints?  17. NO - Is there any chance that you may be pregnant?      HPI:     HPI related to upcoming procedure: feels fine.  Left knee painful.        See problem list for active medical problems.  Problems all longstanding and stable, except as noted/documented.  See ROS for pertinent symptoms related to these conditions.      MEDICAL HISTORY:     Patient Active Problem List    Diagnosis Date Noted     Immune to hepatitis B 03/13/2019     Priority: Medium     ACP (advance care planning) 02/06/2013     Priority: Medium     Advance Care Planning 5/3/2017: ACP Review of Chart / Resources Provided:  Reviewed chart for advance care plan.  Shefali MERRILL Paulte has been provided information and resources to begin or update their advance care plan.  Added by Shania Cobos              Past Medical History:   Diagnosis Date     Malignant neoplasm of breast (female), unspecified 11/28/2005     Past Surgical History:   Procedure Laterality Date     APPENDECTOMY  2004     ARTHROSCOPY KNEE Left 4/9/2015    Procedure: ARTHROSCOPY KNEE;  Surgeon: Arden Oreilly MD;  Location: [facility]     bilateral mastectomy      Bilateral, lymph nodes removed from left side     breast reconstruction  6/2006    Bilateral, expansion with saline implants     colonoscopy with biopsy  2012     DILATION AND CURETTAGE      D&C, multiple     EGD with biopsy  2012     HYSTERECTOMY TOTAL ABDOMINAL, BILATERAL SALPINGO-OOPHORECTOMY, COMBINED Bilateral 08/26/2004    no cervix, [facility]      needle loc breast biopsy  2005    LT     TONSILLECTOMY  1961     Current Outpatient Medications   Medication Sig Dispense Refill     IBUPROFEN PO Take 600 mg by mouth every 8 hours as needed for moderate pain       tolterodine ER (DETROL LA) 4 MG 24 hr capsule Take 1 capsule (4 mg) by mouth daily 90 capsule 3     venlafaxine  (EFFEXOR-XR) 75 MG 24 hr capsule Take 1 capsule (75 mg) by mouth daily 90 capsule 3     OTC products: None, except as noted above    Allergies   Allergen Reactions     Benoxinate Hcl Other (See Comments), Itching and Swelling     Burning  Fluress       Fluorescein Sodium Other (See Comments), Itching and Swelling     Burning  Fluress     Fluorescein-Benoxinate Other (See Comments)      Latex Allergy: NO    Social History     Tobacco Use     Smoking status: Current Every Day Smoker     Packs/day: 0.50     Years: 44.00     Pack years: 22.00     Types: Cigarettes     Start date: 1/1/1976     Smokeless tobacco: Never Used   Substance Use Topics     Alcohol use: Yes     Alcohol/week: 0.0 oz     Comment: rarely     History   Drug Use No       REVIEW OF SYSTEMS:   CONSTITUTIONAL: NEGATIVE for fever, chills, change in weight  INTEGUMENTARY/SKIN: NEGATIVE for worrisome rashes, moles or lesions  EYES: NEGATIVE for vision changes or irritation  ENT/MOUTH: NEGATIVE for ear, mouth and throat problems  RESP: NEGATIVE for significant cough or SOB  BREAST: NEGATIVE for masses, tenderness or discharge  CV: NEGATIVE for chest pain, palpitations or peripheral edema  GI: NEGATIVE for nausea, abdominal pain, heartburn, or change in bowel habits  : NEGATIVE for frequency, dysuria, or hematuria  MUSCULOSKELETAL:joint stiffness and pain left knee  NEURO: NEGATIVE for weakness, dizziness or paresthesias  ENDOCRINE: NEGATIVE for temperature intolerance, skin/hair changes  HEME: NEGATIVE for bleeding problems  PSYCHIATRIC: NEGATIVE for changes in mood or affect    EXAM:   There were no vitals taken for this visit.    GENERAL APPEARANCE: healthy, alert and no distress     EYES: EOMI, PERRL     HENT: ear canals and TM's normal and nose and mouth without ulcers or lesions     NECK: no adenopathy, no asymmetry, masses, or scars and thyroid normal to palpation     RESP: lungs clear to auscultation - no rales, rhonchi or wheezes     CV: regular  rates and rhythm, normal S1 S2, no S3 or S4 and no murmur, click or rub     ABDOMEN:  soft, nontender, no HSM or masses and bowel sounds normal     MS: extremities normal- no gross deformities noted, no evidence of inflammation in joints, FROM in all extremities.     SKIN: no suspicious lesions or rashes     NEURO: Normal strength and tone, sensory exam grossly normal, mentation intact and speech normal     PSYCH: mentation appears normal. and affect normal/bright     LYMPHATICS: No cervical adenopathy    DIAGNOSTICS:   EKG: appears normal, NSR, normal axis, normal intervals, no acute ST/T changes c/w ischemia, no LVH by voltage criteria, unchanged from previous tracings.  Cbc normal.  Esr normal.  UA normal.  Bmp pending.      Recent Labs   Lab Test 03/09/19  1329 07/29/18  1035   HGB 15.7 15.2    232    140   POTASSIUM 3.6 4.2   CR 0.68 0.60        IMPRESSION:   Reason for surgery/procedure: left knee oa  Diagnosis/reason for consult: upcoming left TKA preop clearance.     The proposed surgical procedure is considered INTERMEDIATE risk.    REVISED CARDIAC RISK INDEX  The patient has the following serious cardiovascular risks for perioperative complications such as (MI, PE, VFib and 3  AV Block):  No serious cardiac risks  INTERPRETATION: 0 risks: Class I (very low risk - 0.4% complication rate)    The patient has the following additional risks for perioperative complications:  No identified additional risks      ICD-10-CM    1. Preop general physical exam Z01.818        RECOMMENDATIONS:     --Consult hospital rounder / IM to assist post-op medical management    --Patient is to take all scheduled medications on the day of surgery EXCEPT for modifications listed below.    APPROVAL GIVEN to proceed with proposed procedure, without further diagnostic evaluation       Signed Electronically by: Yosef Gonzales MD    Copy of this evaluation report is provided to requesting physician.    [facility] Preop  Guidelines    Revised Cardiac Risk Index  [facility]  [address]  [facility] [place] [address]  [phone]  Dept: [phone]

## 2019-09-03 ENCOUNTER — OFFICE VISIT (OUTPATIENT)
Dept: FAMILY MEDICINE | Facility: OTHER | Age: 64
End: 2019-09-03
Attending: FAMILY MEDICINE
Payer: COMMERCIAL

## 2019-09-03 VITALS
OXYGEN SATURATION: 96 % | BODY MASS INDEX: 32.25 KG/M2 | DIASTOLIC BLOOD PRESSURE: 72 MMHG | WEIGHT: 228 LBS | SYSTOLIC BLOOD PRESSURE: 126 MMHG | HEART RATE: 81 BPM

## 2019-09-03 DIAGNOSIS — Z01.818 PREOP GENERAL PHYSICAL EXAM: Primary | ICD-10-CM

## 2019-09-03 DIAGNOSIS — M17.12 PRIMARY OSTEOARTHRITIS OF LEFT KNEE: ICD-10-CM

## 2019-09-03 DIAGNOSIS — Z72.0 TOBACCO ABUSE: ICD-10-CM

## 2019-09-03 DIAGNOSIS — Z71.6 TOBACCO ABUSE COUNSELING: ICD-10-CM

## 2019-09-03 LAB
ALBUMIN UR-MCNC: NEGATIVE MG/DL
APPEARANCE UR: CLEAR
BASOPHILS # BLD AUTO: 0 10E9/L (ref 0–0.2)
BASOPHILS NFR BLD AUTO: 0.3 %
BILIRUB UR QL STRIP: NEGATIVE
COLOR UR AUTO: YELLOW
DIFFERENTIAL METHOD BLD: NORMAL
EOSINOPHIL # BLD AUTO: 0.1 10E9/L (ref 0–0.7)
EOSINOPHIL NFR BLD AUTO: 1.5 %
ERYTHROCYTE [DISTWIDTH] IN BLOOD BY AUTOMATED COUNT: 13.6 % (ref 10–15)
ERYTHROCYTE [SEDIMENTATION RATE] IN BLOOD BY WESTERGREN METHOD: 8 MM/H (ref 0–30)
GLUCOSE UR STRIP-MCNC: NEGATIVE MG/DL
HCT VFR BLD AUTO: 42.5 % (ref 35–47)
HGB BLD-MCNC: 14.2 G/DL (ref 11.7–15.7)
HGB UR QL STRIP: NEGATIVE
KETONES UR STRIP-MCNC: NEGATIVE MG/DL
LEUKOCYTE ESTERASE UR QL STRIP: NEGATIVE
LYMPHOCYTES # BLD AUTO: 2.4 10E9/L (ref 0.8–5.3)
LYMPHOCYTES NFR BLD AUTO: 31 %
MCH RBC QN AUTO: 30.3 PG (ref 26.5–33)
MCHC RBC AUTO-ENTMCNC: 33.4 G/DL (ref 31.5–36.5)
MCV RBC AUTO: 91 FL (ref 78–100)
MONOCYTES # BLD AUTO: 0.6 10E9/L (ref 0–1.3)
MONOCYTES NFR BLD AUTO: 7.9 %
NEUTROPHILS # BLD AUTO: 4.7 10E9/L (ref 1.6–8.3)
NEUTROPHILS NFR BLD AUTO: 59.3 %
NITRATE UR QL: NEGATIVE
PH UR STRIP: 6 PH (ref 5–7)
PLATELET # BLD AUTO: 210 10E9/L (ref 150–450)
RBC # BLD AUTO: 4.68 10E12/L (ref 3.8–5.2)
SOURCE: NORMAL
SP GR UR STRIP: 1.02 (ref 1–1.03)
UROBILINOGEN UR STRIP-ACNC: 1 EU/DL (ref 0.2–1)
WBC # BLD AUTO: 7.8 10E9/L (ref 4–11)

## 2019-09-03 PROCEDURE — 85652 RBC SED RATE AUTOMATED: CPT | Performed by: FAMILY MEDICINE

## 2019-09-03 PROCEDURE — 85025 COMPLETE CBC W/AUTO DIFF WBC: CPT | Performed by: FAMILY MEDICINE

## 2019-09-03 PROCEDURE — 80048 BASIC METABOLIC PNL TOTAL CA: CPT | Performed by: FAMILY MEDICINE

## 2019-09-03 PROCEDURE — 36415 COLL VENOUS BLD VENIPUNCTURE: CPT | Performed by: FAMILY MEDICINE

## 2019-09-03 PROCEDURE — 81003 URINALYSIS AUTO W/O SCOPE: CPT | Performed by: FAMILY MEDICINE

## 2019-09-03 PROCEDURE — 93000 ELECTROCARDIOGRAM COMPLETE: CPT | Performed by: INTERNAL MEDICINE

## 2019-09-03 PROCEDURE — 99214 OFFICE O/P EST MOD 30 MIN: CPT | Performed by: FAMILY MEDICINE

## 2019-09-03 ASSESSMENT — PAIN SCALES - GENERAL: PAINLEVEL: MODERATE PAIN (5)

## 2019-09-03 NOTE — NURSING NOTE
"Chief Complaint   Patient presents with     Pre-Op Exam       Initial /72   Pulse 81   Wt 103.4 kg (228 lb)   SpO2 96%   BMI 32.25 kg/m   Estimated body mass index is 32.25 kg/m  as calculated from the following:    Height as of 2/1/19: 1.791 m (5' 10.5\").    Weight as of this encounter: 103.4 kg (228 lb).  Medication Reconciliation: complete     Justa Jara MA      "

## 2019-09-03 NOTE — LETTER
September 5, 2019      Shefali DESIRAE Dailey  [address] [place] [address]        Dear ,    We are writing to inform you of your test results.    Your test results fall within the expected range(s) or remain unchanged from previous results.  Please continue with current treatment plan.    Resulted Orders   Basic metabolic panel   Result Value Ref Range    Sodium 140 133 - 144 mmol/L    Potassium 4.1 3.4 - 5.3 mmol/L    Chloride 108 94 - 109 mmol/L    Carbon Dioxide 24 20 - 32 mmol/L    Anion Gap 8 3 - 14 mmol/L    Glucose 105 (H) 70 - 99 mg/dL      Comment:      Non Fasting    Urea Nitrogen 24 7 - 30 mg/dL    Creatinine 0.79 0.52 - 1.04 mg/dL    GFR Estimate 80 >60 mL/min/[1.73_m2]      Comment:      Non  GFR Calc  Starting 12/18/2018, serum creatinine based estimated GFR (eGFR) will be   calculated using the Chronic Kidney Disease Epidemiology Collaboration   (CKD-EPI) equation.      GFR Estimate If Black >90 >60 mL/min/[1.73_m2]      Comment:       GFR Calc  Starting 12/18/2018, serum creatinine based estimated GFR (eGFR) will be   calculated using the Chronic Kidney Disease Epidemiology Collaboration   (CKD-EPI) equation.      Calcium 8.9 8.5 - 10.1 mg/dL   CBC with platelets differential   Result Value Ref Range    WBC 7.8 4.0 - 11.0 10e9/L    RBC Count 4.68 3.8 - 5.2 10e12/L    Hemoglobin 14.2 11.7 - 15.7 g/dL    Hematocrit 42.5 35.0 - 47.0 %    MCV 91 78 - 100 fl    MCH 30.3 26.5 - 33.0 pg    MCHC 33.4 31.5 - 36.5 g/dL    RDW 13.6 10.0 - 15.0 %    Platelet Count 210 150 - 450 10e9/L    % Neutrophils 59.3 %    % Lymphocytes 31.0 %    % Monocytes 7.9 %    % Eosinophils 1.5 %    % Basophils 0.3 %    Absolute Neutrophil 4.7 1.6 - 8.3 10e9/L    Absolute Lymphocytes 2.4 0.8 - 5.3 10e9/L    Absolute Monocytes 0.6 0.0 - 1.3 10e9/L    Absolute Eosinophils 0.1 0.0 - 0.7 10e9/L    Absolute Basophils 0.0 0.0 - 0.2 10e9/L    Diff Method Automated Method    ESR: Erythrocyte  sedimentation rate   Result Value Ref Range    Sed Rate 8 0 - 30 mm/h   *UA reflex to Microscopic and Culture - [facility]   Result Value Ref Range    Color Urine Yellow     Appearance Urine Clear     Glucose Urine Negative NEG^Negative mg/dL    Bilirubin Urine Negative NEG^Negative    Ketones Urine Negative NEG^Negative mg/dL    Specific Gravity Urine 1.020 1.003 - 1.035    Blood Urine Negative NEG^Negative    pH Urine 6.0 5.0 - 7.0 pH    Protein Albumin Urine Negative NEG^Negative mg/dL    Urobilinogen Urine 1.0 0.2 - 1.0 EU/dL    Nitrite Urine Negative NEG^Negative    Leukocyte Esterase Urine Negative NEG^Negative    Source Midstream Urine        If you have any questions or concerns, please call the clinic at the number listed above.       Sincerely,        Yosef Gonzales MD

## 2019-09-04 ENCOUNTER — TELEPHONE (OUTPATIENT)
Dept: FAMILY MEDICINE | Facility: OTHER | Age: 64
End: 2019-09-04

## 2019-09-04 LAB
ANION GAP SERPL CALCULATED.3IONS-SCNC: 8 MMOL/L (ref 3–14)
BUN SERPL-MCNC: 24 MG/DL (ref 7–30)
CALCIUM SERPL-MCNC: 8.9 MG/DL (ref 8.5–10.1)
CHLORIDE SERPL-SCNC: 108 MMOL/L (ref 94–109)
CO2 SERPL-SCNC: 24 MMOL/L (ref 20–32)
CREAT SERPL-MCNC: 0.79 MG/DL (ref 0.52–1.04)
GFR SERPL CREATININE-BSD FRML MDRD: 80 ML/MIN/{1.73_M2}
GLUCOSE SERPL-MCNC: 105 MG/DL (ref 70–99)
POTASSIUM SERPL-SCNC: 4.1 MMOL/L (ref 3.4–5.3)
SODIUM SERPL-SCNC: 140 MMOL/L (ref 133–144)

## 2019-09-04 NOTE — TELEPHONE ENCOUNTER
Faxed preop Surgery 9/12/19 [facility] [place] [facility]  Fx# [phone] fxd demo,med list,H & P 9/3/19 Dr Gonzales,labs,EKG  Dx: procedure / Left total knee Be  Meghan Quinonez

## 2019-09-05 ENCOUNTER — ANESTHESIA EVENT (OUTPATIENT)
Dept: SURGERY | Facility: HOSPITAL | Age: 64
DRG: 470 | End: 2019-09-05
Payer: COMMERCIAL

## 2019-09-05 ASSESSMENT — LIFESTYLE VARIABLES: TOBACCO_USE: 1

## 2019-09-05 NOTE — OR NURSING
Email sent to total joint replacement team at [facility] as follows;    We have Shefali Dailey : 59 coming  for LTKA with Dr. Garner.  Shefali works here in our OR.  She received total joint replacement education.  Shefali plans on going to her home in [place] with help from significant other.  She has several stairs and has some equipment.  She was interested in home PT and would like more information about it.    Reviewed following topics with her;  Call don t Fall , Capnography monitoring, Iceman cooler, and Signs & Symptoms of Infection to watch/report and prevent.  Shefali verbalized good understanding of all topics discussed.    Thank you,  Cira Adrian R.N.  Pre-Anesthesia Testing Surgical Education  [facility]

## 2019-09-05 NOTE — ANESTHESIA PREPROCEDURE EVALUATION
Anesthesia Pre-Procedure Evaluation    Patient: Shefali Dailey   MRN: [id number] : 1955          Preoperative Diagnosis: LEFT KNEE PAIN    Procedure(s):  LEFT TOTAL KNEE/NITHIN    Past Medical History:   Diagnosis Date     Malignant neoplasm of breast (female), unspecified 2005     Past Surgical History:   Procedure Laterality Date     APPENDECTOMY  2004     ARTHROSCOPY KNEE Left 2015    Procedure: ARTHROSCOPY KNEE;  Surgeon: Arden Oreilly MD;  Location: [facility]     bilateral mastectomy      Bilateral, lymph nodes removed from left side     breast reconstruction  2006    Bilateral, expansion with saline implants     colonoscopy with biopsy       DILATION AND CURETTAGE      D&C, multiple     EGD with biopsy       HYSTERECTOMY TOTAL ABDOMINAL, BILATERAL SALPINGO-OOPHORECTOMY, COMBINED Bilateral 2004    no cervix, [facility]      needle loc breast biopsy      LT     TONSILLECTOMY         Anesthesia Evaluation     . Pt has had prior anesthetic.     No history of anesthetic complications          ROS/MED HX    ENT/Pulmonary:     (+)SAMANTHA risk factors snores loudly, tobacco use, Current use <0.5 PPD packs/day  , . .    Neurologic:  - neg neurologic ROS     Cardiovascular:  - neg cardiovascular ROS       METS/Exercise Tolerance:     Hematologic:  - neg hematologic  ROS       Musculoskeletal: Comment: DJD  (+) arthritis,  other musculoskeletal- DJD      GI/Hepatic:     (+) GERD Symptomatic,       Renal/Genitourinary:  - ROS Renal section negative       Endo:     (+) Obesity, .      Psychiatric:     (+) psychiatric history depression      Infectious Disease:  - neg infectious disease ROS       Malignancy:   (+) Malignancy History of Breast  Breast CA status post Surgery.         Other:    - neg other ROS                      Physical Exam  Normal systems: cardiovascular and pulmonary    Airway   Mallampati: III  TM distance: >3 FB  Neck ROM: full    Dental   (+)  "missing    Cardiovascular   Rhythm and rate: regular and normal      Pulmonary    breath sounds clear to auscultation            Lab Results   Component Value Date    WBC 7.8 09/03/2019    HGB 14.2 09/03/2019    HCT 42.5 09/03/2019     09/03/2019    CRP 7.7 03/09/2019    SED 8 09/03/2019     09/03/2019    POTASSIUM 4.1 09/03/2019    CHLORIDE 108 09/03/2019    CO2 24 09/03/2019    BUN 24 09/03/2019    CR 0.79 09/03/2019     (H) 09/03/2019    LISA 8.9 09/03/2019    ALBUMIN 4.1 03/09/2019    PROTTOTAL 7.6 03/09/2019    ALT 30 03/09/2019    AST 22 03/09/2019    ALKPHOS 86 03/09/2019    BILITOTAL 0.3 03/09/2019    LIPASE 205 05/03/2017       Preop Vitals  BP Readings from Last 3 Encounters:   09/03/19 126/72   03/13/19 130/58   03/09/19 132/84    Pulse Readings from Last 3 Encounters:   09/03/19 81   03/13/19 80   03/09/19 70      Resp Readings from Last 3 Encounters:   03/09/19 19   07/29/18 16   05/03/17 16    SpO2 Readings from Last 3 Encounters:   09/03/19 96%   03/13/19 96%   03/09/19 96%      Temp Readings from Last 1 Encounters:   03/13/19 98  F (36.7  C) (Tympanic)    Ht Readings from Last 1 Encounters:   02/01/19 1.791 m (5' 10.5\")      Wt Readings from Last 1 Encounters:   09/03/19 103.4 kg (228 lb)    Estimated body mass index is 32.25 kg/m  as calculated from the following:    Height as of 2/1/19: 1.791 m (5' 10.5\").    Weight as of 9/3/19: 103.4 kg (228 lb).       Anesthesia Plan      History & Physical Review  History and physical reviewed and following examination; no interval change.    ASA Status:  2 .    NPO Status:  > 8 hours    Plan for Spinal and Periph. Nerve Block for postop pain with Intravenous and Propofol induction. Maintenance will be TIVA.    PONV prophylaxis:  Ondansetron (or other 5HT-3), Dexamethasone or Solumedrol and Scopolamine patch       Postoperative Care  Postoperative pain management:  IV analgesics, Oral pain medications, Neuraxial analgesia and Peripheral nerve " block (Single Shot).      Consents  Anesthetic plan, risks, benefits and alternatives discussed with:  Patient..                 RENNY Silva CRNA

## 2019-09-12 ENCOUNTER — HOSPITAL ENCOUNTER (INPATIENT)
Facility: HOSPITAL | Age: 64
LOS: 1 days | Discharge: HOME | DRG: 470 | End: 2019-09-13
Attending: ORTHOPAEDIC SURGERY | Admitting: ORTHOPAEDIC SURGERY
Payer: COMMERCIAL

## 2019-09-12 ENCOUNTER — ANESTHESIA (OUTPATIENT)
Dept: SURGERY | Facility: HOSPITAL | Age: 64
DRG: 470 | End: 2019-09-12
Payer: COMMERCIAL

## 2019-09-12 ENCOUNTER — APPOINTMENT (OUTPATIENT)
Dept: PHYSICAL THERAPY | Facility: HOSPITAL | Age: 64
DRG: 470 | End: 2019-09-12
Attending: ORTHOPAEDIC SURGERY
Payer: COMMERCIAL

## 2019-09-12 ENCOUNTER — APPOINTMENT (OUTPATIENT)
Dept: GENERAL RADIOLOGY | Facility: HOSPITAL | Age: 64
DRG: 470 | End: 2019-09-12
Attending: ORTHOPAEDIC SURGERY
Payer: COMMERCIAL

## 2019-09-12 DIAGNOSIS — Z96.652 STATUS POST TOTAL LEFT KNEE REPLACEMENT: ICD-10-CM

## 2019-09-12 DIAGNOSIS — Z96.652 S/P TOTAL KNEE ARTHROPLASTY, LEFT: Primary | ICD-10-CM

## 2019-09-12 PROBLEM — M25.562 LEFT KNEE PAIN: Status: ACTIVE | Noted: 2019-09-12

## 2019-09-12 PROCEDURE — C9290 INJ, BUPIVACAINE LIPOSOME: HCPCS | Performed by: ORTHOPAEDIC SURGERY

## 2019-09-12 PROCEDURE — 40000305 ZZH STATISTIC PRE PROC ASSESS I: Performed by: ORTHOPAEDIC SURGERY

## 2019-09-12 PROCEDURE — 25000125 ZZHC RX 250: Performed by: ORTHOPAEDIC SURGERY

## 2019-09-12 PROCEDURE — 27447 TOTAL KNEE ARTHROPLASTY: CPT | Performed by: ANESTHESIOLOGY

## 2019-09-12 PROCEDURE — 12000000 ZZH R&B MED SURG/OB

## 2019-09-12 PROCEDURE — 25000125 ZZHC RX 250: Performed by: NURSE ANESTHETIST, CERTIFIED REGISTERED

## 2019-09-12 PROCEDURE — 27110028 ZZH OR GENERAL SUPPLY NON-STERILE: Performed by: ORTHOPAEDIC SURGERY

## 2019-09-12 PROCEDURE — 40000275 ZZH STATISTIC RCP TIME EA 10 MIN

## 2019-09-12 PROCEDURE — 37000008 ZZH ANESTHESIA TECHNICAL FEE, 1ST 30 MIN: Performed by: ORTHOPAEDIC SURGERY

## 2019-09-12 PROCEDURE — 25000125 ZZHC RX 250: Performed by: ANESTHESIOLOGY

## 2019-09-12 PROCEDURE — 76942 ECHO GUIDE FOR BIOPSY: CPT | Mod: 26 | Performed by: NURSE ANESTHETIST, CERTIFIED REGISTERED

## 2019-09-12 PROCEDURE — 36000063 ZZH SURGERY LEVEL 4 EA 15 ADDTL MIN: Performed by: ORTHOPAEDIC SURGERY

## 2019-09-12 PROCEDURE — 25000128 H RX IP 250 OP 636: Performed by: ORTHOPAEDIC SURGERY

## 2019-09-12 PROCEDURE — 27210794 ZZH OR GENERAL SUPPLY STERILE: Performed by: ORTHOPAEDIC SURGERY

## 2019-09-12 PROCEDURE — 97161 PT EVAL LOW COMPLEX 20 MIN: CPT | Mod: GP

## 2019-09-12 PROCEDURE — 25800030 ZZH RX IP 258 OP 636: Performed by: ORTHOPAEDIC SURGERY

## 2019-09-12 PROCEDURE — 64447 NJX AA&/STRD FEMORAL NRV IMG: CPT | Mod: 59 | Performed by: NURSE ANESTHETIST, CERTIFIED REGISTERED

## 2019-09-12 PROCEDURE — 0SRD0J9 REPLACEMENT OF LEFT KNEE JOINT WITH SYNTHETIC SUBSTITUTE, CEMENTED, OPEN APPROACH: ICD-10-PCS | Performed by: ORTHOPAEDIC SURGERY

## 2019-09-12 PROCEDURE — 37000009 ZZH ANESTHESIA TECHNICAL FEE, EACH ADDTL 15 MIN: Performed by: ORTHOPAEDIC SURGERY

## 2019-09-12 PROCEDURE — 25000128 H RX IP 250 OP 636: Performed by: NURSE ANESTHETIST, CERTIFIED REGISTERED

## 2019-09-12 PROCEDURE — 36000093 ZZH SURGERY LEVEL 4 1ST 30 MIN: Performed by: ORTHOPAEDIC SURGERY

## 2019-09-12 PROCEDURE — 88300 SURGICAL PATH GROSS: CPT | Mod: TC | Performed by: ORTHOPAEDIC SURGERY

## 2019-09-12 PROCEDURE — 3E0T3BZ INTRODUCTION OF ANESTHETIC AGENT INTO PERIPHERAL NERVES AND PLEXI, PERCUTANEOUS APPROACH: ICD-10-PCS | Performed by: NURSE ANESTHETIST, CERTIFIED REGISTERED

## 2019-09-12 PROCEDURE — 25000132 ZZH RX MED GY IP 250 OP 250 PS 637: Performed by: ORTHOPAEDIC SURGERY

## 2019-09-12 PROCEDURE — C1776 JOINT DEVICE (IMPLANTABLE): HCPCS | Performed by: ORTHOPAEDIC SURGERY

## 2019-09-12 PROCEDURE — 27447 TOTAL KNEE ARTHROPLASTY: CPT | Performed by: NURSE ANESTHETIST, CERTIFIED REGISTERED

## 2019-09-12 PROCEDURE — 40000985 XR KNEE PORT LT 1/2 VW: Mod: TC,LT

## 2019-09-12 PROCEDURE — 27810169 ZZH OR IMPLANT GENERAL: Performed by: ORTHOPAEDIC SURGERY

## 2019-09-12 PROCEDURE — 25800030 ZZH RX IP 258 OP 636: Performed by: NURSE ANESTHETIST, CERTIFIED REGISTERED

## 2019-09-12 PROCEDURE — 71000014 ZZH RECOVERY PHASE 1 LEVEL 2 FIRST HR: Performed by: ORTHOPAEDIC SURGERY

## 2019-09-12 DEVICE — IMPLANTABLE DEVICE: Type: IMPLANTABLE DEVICE | Site: KNEE | Status: FUNCTIONAL

## 2019-09-12 DEVICE — BONE CEMENT-SIMPLEX W/TOBRAMYCIN: Type: IMPLANTABLE DEVICE | Site: KNEE | Status: FUNCTIONAL

## 2019-09-12 DEVICE — PATELLA-NEXGEN ALL POLY 35MM: Type: IMPLANTABLE DEVICE | Site: KNEE | Status: FUNCTIONAL

## 2019-09-12 RX ORDER — PROCHLORPERAZINE MALEATE 10 MG
10 TABLET ORAL EVERY 6 HOURS PRN
Status: DISCONTINUED | OUTPATIENT
Start: 2019-09-12 | End: 2019-09-13 | Stop reason: HOSPADM

## 2019-09-12 RX ORDER — HYDROCODONE BITARTRATE AND ACETAMINOPHEN 5; 325 MG/1; MG/1
1 TABLET ORAL EVERY 4 HOURS PRN
Status: DISCONTINUED | OUTPATIENT
Start: 2019-09-12 | End: 2019-09-13

## 2019-09-12 RX ORDER — PROPOFOL 10 MG/ML
INJECTION, EMULSION INTRAVENOUS CONTINUOUS PRN
Status: DISCONTINUED | OUTPATIENT
Start: 2019-09-12 | End: 2019-09-12

## 2019-09-12 RX ORDER — ONDANSETRON 2 MG/ML
4 INJECTION INTRAMUSCULAR; INTRAVENOUS EVERY 6 HOURS PRN
Status: DISCONTINUED | OUTPATIENT
Start: 2019-09-12 | End: 2019-09-13 | Stop reason: HOSPADM

## 2019-09-12 RX ORDER — CYCLOBENZAPRINE HCL 10 MG
10 TABLET ORAL 3 TIMES DAILY PRN
Status: DISCONTINUED | OUTPATIENT
Start: 2019-09-12 | End: 2019-09-13 | Stop reason: HOSPADM

## 2019-09-12 RX ORDER — ONDANSETRON 4 MG/1
4 TABLET, ORALLY DISINTEGRATING ORAL EVERY 6 HOURS PRN
Status: DISCONTINUED | OUTPATIENT
Start: 2019-09-12 | End: 2019-09-13 | Stop reason: HOSPADM

## 2019-09-12 RX ORDER — NALOXONE HYDROCHLORIDE 0.4 MG/ML
.1-.4 INJECTION, SOLUTION INTRAMUSCULAR; INTRAVENOUS; SUBCUTANEOUS
Status: DISCONTINUED | OUTPATIENT
Start: 2019-09-12 | End: 2019-09-13 | Stop reason: HOSPADM

## 2019-09-12 RX ORDER — ONDANSETRON 4 MG/1
4 TABLET, ORALLY DISINTEGRATING ORAL EVERY 30 MIN PRN
Status: DISCONTINUED | OUTPATIENT
Start: 2019-09-12 | End: 2019-09-12 | Stop reason: HOSPADM

## 2019-09-12 RX ORDER — BUPIVACAINE HYDROCHLORIDE 7.5 MG/ML
INJECTION, SOLUTION INTRASPINAL PRN
Status: DISCONTINUED | OUTPATIENT
Start: 2019-09-12 | End: 2019-09-12

## 2019-09-12 RX ORDER — LIDOCAINE 40 MG/G
CREAM TOPICAL
Status: DISCONTINUED | OUTPATIENT
Start: 2019-09-16 | End: 2019-09-13 | Stop reason: HOSPADM

## 2019-09-12 RX ORDER — ONDANSETRON 2 MG/ML
4 INJECTION INTRAMUSCULAR; INTRAVENOUS EVERY 30 MIN PRN
Status: DISCONTINUED | OUTPATIENT
Start: 2019-09-12 | End: 2019-09-12 | Stop reason: HOSPADM

## 2019-09-12 RX ORDER — AMOXICILLIN 250 MG
1 CAPSULE ORAL 2 TIMES DAILY
Status: DISCONTINUED | OUTPATIENT
Start: 2019-09-12 | End: 2019-09-13 | Stop reason: HOSPADM

## 2019-09-12 RX ORDER — HYDROCODONE BITARTRATE AND ACETAMINOPHEN 7.5; 325 MG/15ML; MG/15ML
10-15 SOLUTION ORAL EVERY 4 HOURS PRN
Status: DISCONTINUED | OUTPATIENT
Start: 2019-09-12 | End: 2019-09-12

## 2019-09-12 RX ORDER — NALOXONE HYDROCHLORIDE 0.4 MG/ML
.1-.4 INJECTION, SOLUTION INTRAMUSCULAR; INTRAVENOUS; SUBCUTANEOUS
Status: ACTIVE | OUTPATIENT
Start: 2019-09-12 | End: 2019-09-13

## 2019-09-12 RX ORDER — FENTANYL CITRATE 50 UG/ML
25-50 INJECTION, SOLUTION INTRAMUSCULAR; INTRAVENOUS
Status: DISCONTINUED | OUTPATIENT
Start: 2019-09-12 | End: 2019-09-12 | Stop reason: HOSPADM

## 2019-09-12 RX ORDER — VENLAFAXINE HYDROCHLORIDE 75 MG/1
75 CAPSULE, EXTENDED RELEASE ORAL DAILY
Status: DISCONTINUED | OUTPATIENT
Start: 2019-09-12 | End: 2019-09-13 | Stop reason: HOSPADM

## 2019-09-12 RX ORDER — CEFAZOLIN SODIUM 2 G/100ML
2 INJECTION, SOLUTION INTRAVENOUS
Status: COMPLETED | OUTPATIENT
Start: 2019-09-12 | End: 2019-09-12

## 2019-09-12 RX ORDER — ROPIVACAINE HYDROCHLORIDE 5 MG/ML
INJECTION, SOLUTION EPIDURAL; INFILTRATION; PERINEURAL PRN
Status: DISCONTINUED | OUTPATIENT
Start: 2019-09-12 | End: 2019-09-12

## 2019-09-12 RX ORDER — HYDROMORPHONE HYDROCHLORIDE 1 MG/ML
.3-.5 INJECTION, SOLUTION INTRAMUSCULAR; INTRAVENOUS; SUBCUTANEOUS
Status: DISCONTINUED | OUTPATIENT
Start: 2019-09-12 | End: 2019-09-13 | Stop reason: HOSPADM

## 2019-09-12 RX ORDER — FENTANYL CITRATE 50 UG/ML
INJECTION, SOLUTION INTRAMUSCULAR; INTRAVENOUS PRN
Status: DISCONTINUED | OUTPATIENT
Start: 2019-09-12 | End: 2019-09-12

## 2019-09-12 RX ORDER — SODIUM CHLORIDE, SODIUM LACTATE, POTASSIUM CHLORIDE, CALCIUM CHLORIDE 600; 310; 30; 20 MG/100ML; MG/100ML; MG/100ML; MG/100ML
INJECTION, SOLUTION INTRAVENOUS CONTINUOUS
Status: DISCONTINUED | OUTPATIENT
Start: 2019-09-12 | End: 2019-09-12 | Stop reason: HOSPADM

## 2019-09-12 RX ORDER — BUPIVACAINE HYDROCHLORIDE 2.5 MG/ML
INJECTION, SOLUTION INFILTRATION; PERINEURAL PRN
Status: DISCONTINUED | OUTPATIENT
Start: 2019-09-12 | End: 2019-09-12 | Stop reason: HOSPADM

## 2019-09-12 RX ORDER — CEFAZOLIN SODIUM 2 G/100ML
2 INJECTION, SOLUTION INTRAVENOUS EVERY 8 HOURS
Status: COMPLETED | OUTPATIENT
Start: 2019-09-12 | End: 2019-09-13

## 2019-09-12 RX ORDER — LABETALOL 20 MG/4 ML (5 MG/ML) INTRAVENOUS SYRINGE
10
Status: DISCONTINUED | OUTPATIENT
Start: 2019-09-12 | End: 2019-09-12 | Stop reason: HOSPADM

## 2019-09-12 RX ORDER — SCOLOPAMINE TRANSDERMAL SYSTEM 1 MG/1
1 PATCH, EXTENDED RELEASE TRANSDERMAL ONCE
Status: COMPLETED | OUTPATIENT
Start: 2019-09-12 | End: 2019-09-12

## 2019-09-12 RX ORDER — CEFAZOLIN SODIUM 1 G/3ML
1 INJECTION, POWDER, FOR SOLUTION INTRAMUSCULAR; INTRAVENOUS SEE ADMIN INSTRUCTIONS
Status: DISCONTINUED | OUTPATIENT
Start: 2019-09-12 | End: 2019-09-12 | Stop reason: HOSPADM

## 2019-09-12 RX ORDER — HYDROMORPHONE HYDROCHLORIDE 1 MG/ML
.3-.5 INJECTION, SOLUTION INTRAMUSCULAR; INTRAVENOUS; SUBCUTANEOUS EVERY 5 MIN PRN
Status: DISCONTINUED | OUTPATIENT
Start: 2019-09-12 | End: 2019-09-12 | Stop reason: HOSPADM

## 2019-09-12 RX ORDER — DEXAMETHASONE SODIUM PHOSPHATE 10 MG/ML
INJECTION, SOLUTION INTRAMUSCULAR; INTRAVENOUS PRN
Status: DISCONTINUED | OUTPATIENT
Start: 2019-09-12 | End: 2019-09-12

## 2019-09-12 RX ORDER — AMOXICILLIN 250 MG
2 CAPSULE ORAL 2 TIMES DAILY
Status: DISCONTINUED | OUTPATIENT
Start: 2019-09-12 | End: 2019-09-13 | Stop reason: HOSPADM

## 2019-09-12 RX ORDER — ZOLPIDEM TARTRATE 5 MG/1
5 TABLET ORAL
Status: DISCONTINUED | OUTPATIENT
Start: 2019-09-13 | End: 2019-09-13 | Stop reason: HOSPADM

## 2019-09-12 RX ORDER — KETOROLAC TROMETHAMINE 30 MG/ML
30 INJECTION, SOLUTION INTRAMUSCULAR; INTRAVENOUS EVERY 6 HOURS
Status: COMPLETED | OUTPATIENT
Start: 2019-09-12 | End: 2019-09-13

## 2019-09-12 RX ORDER — ONDANSETRON 2 MG/ML
INJECTION INTRAMUSCULAR; INTRAVENOUS PRN
Status: DISCONTINUED | OUTPATIENT
Start: 2019-09-12 | End: 2019-09-12

## 2019-09-12 RX ORDER — MEPERIDINE HYDROCHLORIDE 50 MG/ML
12.5 INJECTION INTRAMUSCULAR; INTRAVENOUS; SUBCUTANEOUS EVERY 5 MIN PRN
Status: DISCONTINUED | OUTPATIENT
Start: 2019-09-12 | End: 2019-09-12 | Stop reason: HOSPADM

## 2019-09-12 RX ORDER — LIDOCAINE 40 MG/G
CREAM TOPICAL
Status: DISCONTINUED | OUTPATIENT
Start: 2019-09-12 | End: 2019-09-12 | Stop reason: HOSPADM

## 2019-09-12 RX ORDER — TOLTERODINE 4 MG/1
4 CAPSULE, EXTENDED RELEASE ORAL DAILY
Status: DISCONTINUED | OUTPATIENT
Start: 2019-09-12 | End: 2019-09-13 | Stop reason: HOSPADM

## 2019-09-12 RX ORDER — SODIUM CHLORIDE 9 MG/ML
INJECTION, SOLUTION INTRAVENOUS CONTINUOUS
Status: DISCONTINUED | OUTPATIENT
Start: 2019-09-12 | End: 2019-09-13 | Stop reason: HOSPADM

## 2019-09-12 RX ADMIN — BUPIVACAINE HYDROCHLORIDE IN DEXTROSE 1.8 ML: 7.5 INJECTION, SOLUTION SUBARACHNOID at 07:45

## 2019-09-12 RX ADMIN — ROPIVACAINE HYDROCHLORIDE 30 ML: 5 INJECTION, SOLUTION EPIDURAL; INFILTRATION; PERINEURAL at 07:20

## 2019-09-12 RX ADMIN — HYDROMORPHONE HYDROCHLORIDE 0.5 MG: 1 INJECTION, SOLUTION INTRAMUSCULAR; INTRAVENOUS; SUBCUTANEOUS at 15:56

## 2019-09-12 RX ADMIN — SODIUM CHLORIDE, POTASSIUM CHLORIDE, SODIUM LACTATE AND CALCIUM CHLORIDE: 600; 310; 30; 20 INJECTION, SOLUTION INTRAVENOUS at 07:10

## 2019-09-12 RX ADMIN — KETOROLAC TROMETHAMINE 30 MG: 30 INJECTION, SOLUTION INTRAMUSCULAR; INTRAVENOUS at 18:07

## 2019-09-12 RX ADMIN — MIDAZOLAM 1 MG: 1 INJECTION INTRAMUSCULAR; INTRAVENOUS at 07:33

## 2019-09-12 RX ADMIN — SENNOSIDES AND DOCUSATE SODIUM 1 TABLET: 8.6; 5 TABLET ORAL at 12:03

## 2019-09-12 RX ADMIN — HYDROCODONE BITARTRATE AND ACETAMINOPHEN 10 ML: 7.5; 325 SOLUTION ORAL at 18:08

## 2019-09-12 RX ADMIN — KETOROLAC TROMETHAMINE 30 MG: 30 INJECTION, SOLUTION INTRAMUSCULAR; INTRAVENOUS at 12:00

## 2019-09-12 RX ADMIN — FENTANYL CITRATE 25 MCG: 50 INJECTION, SOLUTION INTRAMUSCULAR; INTRAVENOUS at 07:40

## 2019-09-12 RX ADMIN — CEFAZOLIN SODIUM 2 G: 2 INJECTION, SOLUTION INTRAVENOUS at 15:44

## 2019-09-12 RX ADMIN — FENTANYL CITRATE 50 MCG: 50 INJECTION, SOLUTION INTRAMUSCULAR; INTRAVENOUS at 07:33

## 2019-09-12 RX ADMIN — SODIUM CHLORIDE: 9 INJECTION, SOLUTION INTRAVENOUS at 11:59

## 2019-09-12 RX ADMIN — MIDAZOLAM 1 MG: 1 INJECTION INTRAMUSCULAR; INTRAVENOUS at 07:40

## 2019-09-12 RX ADMIN — DEXAMETHASONE SODIUM PHOSPHATE 10 MG: 10 INJECTION, SOLUTION INTRAMUSCULAR; INTRAVENOUS at 07:45

## 2019-09-12 RX ADMIN — TRANEXAMIC ACID 1 G: 1 INJECTION, SOLUTION INTRAVENOUS at 07:33

## 2019-09-12 RX ADMIN — TOLTERODINE 4 MG: 4 CAPSULE, EXTENDED RELEASE ORAL at 20:06

## 2019-09-12 RX ADMIN — SCOPALAMINE 1 PATCH: 1 PATCH, EXTENDED RELEASE TRANSDERMAL at 07:22

## 2019-09-12 RX ADMIN — TRANEXAMIC ACID 1 G: 1 INJECTION, SOLUTION INTRAVENOUS at 08:50

## 2019-09-12 RX ADMIN — HYDROCODONE BITARTRATE AND ACETAMINOPHEN 10 ML: 7.5; 325 SOLUTION ORAL at 13:49

## 2019-09-12 RX ADMIN — FENTANYL CITRATE 25 MCG: 50 INJECTION, SOLUTION INTRAMUSCULAR; INTRAVENOUS at 07:45

## 2019-09-12 RX ADMIN — CEFAZOLIN SODIUM 2 G: 2 INJECTION, SOLUTION INTRAVENOUS at 07:33

## 2019-09-12 RX ADMIN — ONDANSETRON 4 MG: 2 INJECTION INTRAMUSCULAR; INTRAVENOUS at 08:55

## 2019-09-12 RX ADMIN — SENNOSIDES AND DOCUSATE SODIUM 1 TABLET: 8.6; 5 TABLET ORAL at 20:07

## 2019-09-12 RX ADMIN — VENLAFAXINE HYDROCHLORIDE 75 MG: 75 CAPSULE, EXTENDED RELEASE ORAL at 20:06

## 2019-09-12 RX ADMIN — PROPOFOL 30 MCG/KG/MIN: 10 INJECTION, EMULSION INTRAVENOUS at 07:45

## 2019-09-12 RX ADMIN — SODIUM CHLORIDE, POTASSIUM CHLORIDE, SODIUM LACTATE AND CALCIUM CHLORIDE: 600; 310; 30; 20 INJECTION, SOLUTION INTRAVENOUS at 08:45

## 2019-09-12 ASSESSMENT — ACTIVITIES OF DAILY LIVING (ADL)
ADLS_ACUITY_SCORE: 11
ADLS_ACUITY_SCORE: 12
ADLS_ACUITY_SCORE: 12

## 2019-09-12 ASSESSMENT — MIFFLIN-ST. JEOR
SCORE: 1688.19
SCORE: 1682.39

## 2019-09-12 NOTE — INTERVAL H&P NOTE
Brief History of Illness:   Shefali Dailey is a 64 year old female who was admitted for left knee pain    H&P reviewed and patient examined with no new updates from prior exam

## 2019-09-12 NOTE — PROGRESS NOTES
Inpatient Physical Therapy Evaluation    Name: Shefali Dailey MRN# [id number]   Age: 64 year old YOB: 1955     Date of Consultation: 2019  Consultation is requested by:  Dr. Garner   Specific Consultation Request:  RIKI TKA   Primary care provider: Yosef Gonzales    General Information:   Onset Date: 19    History of Current Problem/Admission: LEFT KNEE PAIN  Left knee pain    Prior Level of Function: Pt reports she was completely independent with all mobility prior to hospitalization without assistive device  Ambulation: 0 - Independent   Transferrin - Independent   Toiletin - Independent    Bathin - Independent   Dressin - Independent   Eatin - Independent   Communication: 0 - Understands/communicates without difficulty  Swallowin - swallows foods/liquids without difficulty  Cognition: 0 - no cognitive issues reported    Fall history within the last 6 months: No    Current Living Situation: Patient lives in a house with her significant other. Home has 5 stairs to enter and 17 stairs to second story.     Current Equipment Used at Home: None but has walker, cane, and crutches    Patient & Family Goals: Return home with outpatient PT     Past Medical History:   Past Medical History:   Diagnosis Date     Malignant neoplasm of breast (female), unspecified 2005       Past Surgical History:  Past Surgical History:   Procedure Laterality Date     APPENDECTOMY  2004     ARTHROSCOPY KNEE Left 2015    Procedure: ARTHROSCOPY KNEE;  Surgeon: Arden Oreilly MD;  Location: [facility]     bilateral mastectomy      Bilateral, lymph nodes removed from left side     breast reconstruction  2006    Bilateral, expansion with saline implants     colonoscopy with biopsy       DILATION AND CURETTAGE      D&C, multiple     EGD with biopsy       HYSTERECTOMY TOTAL ABDOMINAL, BILATERAL SALPINGO-OOPHORECTOMY, COMBINED Bilateral 2004    no cervix, [facility]      needle loc breast biopsy  2005    LT     TONSILLECTOMY  1961       Medications:   Current Facility-Administered Medications   Medication     [START ON 9/13/2019] aspirin (ASA) EC tablet 325 mg     ceFAZolin (ANCEF) intermittent infusion 2 g in 100 mL dextrose PRE-MIX     cyclobenzaprine (FLEXERIL) tablet 10 mg     HYDROcodone-acetaminophen 7.5-325 MG/15ML solution 10-15 mL     HYDROmorphone (PF) (DILAUDID) injection 0.3-0.5 mg     ketorolac (TORADOL) injection 30 mg     [START ON 9/16/2019] lidocaine (LMX4) kit     [START ON 9/16/2019] lidocaine 1 % 0.1-1 mL     naloxone (NARCAN) injection 0.1-0.4 mg     naloxone (NARCAN) injection 0.1-0.4 mg     naloxone (NARCAN) injection 0.1-0.4 mg     ondansetron (ZOFRAN-ODT) ODT tab 4 mg    Or     ondansetron (ZOFRAN) injection 4 mg     prochlorperazine (COMPAZINE) injection 10 mg    Or     prochlorperazine (COMPAZINE) tablet 10 mg     scopolamine (TRANSDERM-SCOP) Patch in Place     [START ON 9/13/2019] scopolamine (TRANSDERM-SCOP) patch REMOVAL     senna-docusate (SENOKOT-S/PERICOLACE) 8.6-50 MG per tablet 1 tablet    Or     senna-docusate (SENOKOT-S/PERICOLACE) 8.6-50 MG per tablet 2 tablet     sodium chloride (PF) 0.9% PF flush 3 mL     sodium chloride (PF) 0.9% PF flush 3 mL     sodium chloride 0.9% infusion     tolterodine ER (DETROL LA) 24 hr capsule 4 mg     venlafaxine (EFFEXOR-XR) 24 hr capsule 75 mg     [START ON 9/13/2019] zolpidem (AMBIEN) tablet 5 mg       Weight Bearing Status: WBAT     Cognitive Status Examination:  Orientation: awake and alert  Level of Consciousness: alert  Follows Commands and Answers Questions: 100% of the time  Personal Safety and Judgement: Intact  Memory: Immediate recall intact  Comments:     Pain:   Currently in pain? Yes  Pain Location? left knee  Pain Rating:     Physical Therapy Evaluation:   Integumentary/Edema: NA  ROM: L knee AROM 5-70 degrees  Strength: Good quad set, independent SLR  Bed Mobility: NA  Transfers: SBA  with FWW  Gait: Pt ambulated 250' with FWW and SBA . Step-through gait and good WB LLE  Stairs: Plan to try tomorrow   Balance: Good with walker   Sensory: NA  Coordination: Normal     Physical Therapy Interventions: Bed Mobility, Gait Training , ROM, Strengthening, Stretching , Transfer Training, Home Programming Guidelines and Progressive Activity/Exercise     Clinical Impressions:  Criteria for Skilled Therapeutic Intervention Met: Yes, treatment indicated  PT Diagnosis: s/p L TKA   Influenced by the following impairments: Impaired ROM, pain, impaired gait  Functional limitations due to impairment: Decreased tolerance for functional mobility  Clinical presentation: Stable/Uncomplicated  Clinical presentation rationale: Clinical judgement  Clinical Decision making (complexity): Low Complexity  Frequency: 2 times/day   Predicted Duration of Therapy Intervention (days/wks): 5 days    Anticipated Discharge Disposition: Home with Outpatient Therapy   Anticipated Equipment Needs at Discharge:   Risks and Benefits of therapy have been explained: Yes  Patient, Family & other staff in agreement with plan of care: Yes  Clinical Impression Comments: PT evaluation completed s/p L TKA. Pt tolerated session well today only requiring SBA for transfers and ambulation. Plan to try stairs tomorrow. Pt hopes to discharge home tomorrow. Will treat pt 2x/daily during her stay.     Total Eval Time: 15

## 2019-09-12 NOTE — PROGRESS NOTES
[facility]    Hospitalist Progress Note      Assessment & Plan   Shefali Dailey is a 64 year old female who was admitted on 9/12/2019.      1.  Status post left total knee arthroplasty: Patient had fairly significant osteoarthritis.  Underwent the procedure by Dr. Garner today.  Uncomplicated.  Postoperatively she is doing very well.  No current nausea or vomiting.  No shortness of breath.  Legs are starting to come back in terms of movement and feeling.  No pain at this time.  Informed her she will be on aspirin twice a day for DVT prophylaxis starting tomorrow.  In all likelihood physical therapy will see her today.  She does have pain medications ordered both IV and oral.  Told her to try and stay ahead of things so does not get out of control.  Anticipate she will be here for at least 2 days.  Her tentative plan is to be discharged home and will have outpatient physical therapy which will be arranged by Dr. Garner.    2.  Anxiety: Has been on Effexor.  Has had good results.  We will continue during her hospital stay.    3.  Bladder incontinence: Patient has been on the Detrol has had good results with this will continue with during her hospital stay.      Diet: Advance Diet as Tolerated: Regular Diet Adult  Fluids: TKO    DVT Prophylaxis: Aspirin twice daily starting tomorrow.  Compression until that time  Code Status: Full Code    Disposition: Expected discharge in 2 days once medically stable.    Arturo He    Interval History   Patient is alert pleasant no distress no nausea no shortness of breath no abdominal pain.  Still has minimal feeling in her feet is coming back however at this time.  Discussed her DVT prophylaxis pain control physical therapy.  She has no major medical problems.  Anticipate no big issues postoperatively.    -Data reviewed today: I reviewed all new labs and imaging results over the last 24 hours. I personally reviewed no images or EKG's today.    Physical Exam   Temp:  96.7  F (35.9  C) Temp src: Tympanic BP: 103/70   Heart Rate: 56 Resp: 16 SpO2: 95 % O2 Device: None (Room air) Oxygen Delivery: 1 LPM  Vitals:    09/12/19 0715   Weight: 103.4 kg (228 lb)     Vital Signs with Ranges  Temp:  [95.5  F (35.3  C)-99.1  F (37.3  C)] 96.7  F (35.9  C)  Heart Rate:  [46-58] 56  Resp:  [10-18] 16  BP: ()/(55-81) 103/70  SpO2:  [90 %-100 %] 95 %  No intake/output data recorded.    Peripheral IV 09/12/19 Right Hand (Active)   Site Assessment WDL 9/12/2019 10:09 AM   Line Status Infusing 9/12/2019 10:09 AM   Phlebitis Scale 0-->no symptoms 9/12/2019  9:11 AM   Infiltration Scale 0 9/12/2019  9:11 AM   Infiltration Site Treatment Method  None 9/12/2019  9:11 AM   Extravasation? No 9/12/2019  9:11 AM   Dressing Intervention New dressing  9/12/2019  7:15 AM   Number of days: 0       Incision/Surgical Site 09/12/19 Left Knee (Active)   Incision Assessment UTV 9/12/2019 10:09 AM   Closure Approximated;Liquid bandage;Staples 9/12/2019  9:05 AM   Dressing Intervention Clean, dry, intact 9/12/2019 10:09 AM   Number of days: 0     No line/device    Constitutional: Alert and oriented x3. No distress    HEENT: Normocephalic/atraumatic, PERRL, EOMI, mouth moist, neck supple, no LN.     Cardiovascular: RRR. no Murmur, no  rubs, or gallops.  JVD no.  Bruits no .  Pulses 2+    Respiratory: Clear to auscultation bilaterally    Abdomen: Soft, nontender, nondistended, no organomegaly. Bowel sounds present    Extremities: Warm/dry. No edema left leg Ace wrap.  Toes are little cool but she is moving them has sensation.    Neuro:   Non focal, cranial nerves intact, Moves all extremities.  Medications     sodium chloride         [START ON 9/13/2019] aspirin  325 mg Oral BID     ceFAZolin  2 g Intravenous Q8H     ketorolac  30 mg Intravenous Q6H     scopolamine   Transdermal Q8H     [START ON 9/13/2019] scopolamine   Transdermal Once     senna-docusate  1 tablet Oral BID    Or     senna-docusate  2 tablet  Oral BID     sodium chloride (PF)  3 mL Intracatheter Q8H     tolterodine ER  4 mg Oral Daily     venlafaxine  75 mg Oral Daily       Data   No lab results found in last 7 days.    Recent Results (from the past 24 hour(s))   XR Knee Port Left 1/2 Views    Narrative    PROCEDURE:  XR KNEE PORT LT 1/2 VW    HISTORY: postop TKR    COMPARISON:  None.    TECHNIQUE:  2 views of the left knee were obtained.    FINDINGS:  There is a knee prosthesis in place. The prosthetic  elements appear well seated. Gas is seen within the soft tissues  postoperatively.       Impression    IMPRESSION: Knee prosthesis in place      BETSY GOMEZ MD

## 2019-09-12 NOTE — PLAN OF CARE
[facility] Inpatient Admission Note:    Patient admitted to 3116 /3116-1 at approximately 1025 via bed accompanied by nurse from surgery . Report received from [place] in SBAR format at 1025 via face to face in room. Patient in bed upon arrival. Patient is alert and oriented X 3, denies pain; rates at 0 on 0-10 scale.  Patient oriented to room, unit, hourly rounding, and plan of care. Explained admission packet and patient handbook with patient bill of rights brochure. Will continue to monitor and document as needed.     Inpatient Nursing criteria listed below was met:    Health care directives status obtained and documented: Yes    Care Everywhere authorization obtained No    MRSA swab completed for patient 65 years and older: N/A    Patient identifies a surrogate decision maker: Yes If yes, who:Sina (significant other) and Sis (sister) Contact Information:see facesheet    Core Measure diagnosis present:Yes - If yes, state diagnosis: Total Knee.      If initial lactic acid >2.0, repeat lactic acid drawn within one hour of arrival to unit: NA. If no, state reason: N/A    Vaccination assessment and education completed: Yes   Vaccinations received prior to admission: Pneumovax no  Influenza(seasonal)  N/A   Vaccination(s) ordered: N/A    Clergy visit ordered if patient requests:     Skin issues/needs documented: Yes- incision    Isolation Patient: no Education given, correct sign in place and documentation row added to PCS:  N/A    Fall Prevention Yes: Care plan updated, education given and documented, sticker and magnet in place: Yes    Care Plan initiated: Yes    Education Documented (including assessment): Yes    Patient has discharge needs : Yes If yes, please explain: rehab or home PT as recommended

## 2019-09-12 NOTE — BRIEF OP NOTE
[facility]    Brief Operative Note    Pre-operative diagnosis: LEFT KNEE PAIN  Post-operative diagnosis * No post-op diagnosis entered *  Procedure: Procedure(s):  LEFT TOTAL KNEE/NITHIN  Surgeon: Surgeon(s) and Role:     * Mat Garner MD - Primary  Anesthesia: General   Estimated blood loss: Less than 50 ml  Drains: None  Specimens:   ID Type Source Tests Collected by Time Destination   A : LEFT KNEE BONE AND TISSUE Bone Knee, Left SURGICAL PATHOLOGY EXAM Mat Garner MD 9/12/2019  8:42 AM      Findings:   None.  Complications: None.  Implants:    Implant Name Type Inv. Item Serial No.  Lot No. LRB No. Used   Bone Cement-Simplex w/Tobramycin  Bone Cement-Simplex w/Tobramycin  ROSE MARIE [id number] Left 1   Patella-Nexgen All Poly 35mm  Patella-Nexgen All Poly 35mm  NITHIN [id number] Left 1   Tibial Stem-Persona Left Size G 5 Degree  Tibial Stem-Persona Left Size G 5 Degree  NITHIN [id number] Left 1   Persona Articular Surface     [id number] Left 1   Persona Femur PS     [id number] Left 1

## 2019-09-12 NOTE — ANESTHESIA CARE TRANSFER NOTE
Patient: Shefali Dailey    Procedure(s):  LEFT TOTAL KNEE/NITHIN    Diagnosis: LEFT KNEE PAIN  Diagnosis Additional Information: No value filed.    Anesthesia Type:   Spinal, Periph. Nerve Block for postop pain     Note:  Airway :Nasal Cannula  Patient transferred to:PACU  Handoff Report: Identifed the Patient, Identified the Reponsible Provider, Reviewed the pertinent medical history, Discussed the surgical course, Reviewed Intra-OP anesthesia mangement and issues during anesthesia, Set expectations for post-procedure period and Allowed opportunity for questions and acknowledgement of understanding      Vitals: (Last set prior to Anesthesia Care Transfer)    CRNA VITALS  9/12/2019 0838 - 9/12/2019 0913      9/12/2019             Resp Rate (observed):  1  (Abnormal)     Resp Rate (set):  8                Electronically Signed By: RENNY Silva CRNA  September 12, 2019  9:13 AM

## 2019-09-12 NOTE — ANESTHESIA PROCEDURE NOTES
Peripheral nerve/Neuraxial procedure note : Adductor canal  Pre-Procedure  Performed by   Referred by DR. PETTIT  Location: pre-op    Procedure Times:9/12/2019 7:15 AM and 9/12/2019 7:25 AM  Pre-Anesthestic Checklist: patient identified, IV checked, risks and benefits discussed, informed consent, monitors and equipment checked, pre-op evaluation, at physician/surgeon's request and post-op pain management    Timeout  Correct Patient: Yes   Correct Procedure: Yes   Correct Site: Yes   Correct Laterality: Yes   Correct Position: Yes   Site Marked: N/A   .   Procedure Documentation    .    Procedure:  left  Adductor canal.     Ultrasound used to identify targeted nerve, plexus, or vascular marker and placed a needle adjacent to it., Ultrasound was used to visualize the spread of the anesthetic in close proximity to the above stated nerve. A permanent image is entered into the patient's record.  Patient Prep;chlorhexidine gluconate and isopropyl alcohol.  .  Needle: insulated Needle Gauge: 22.    Needle Length (Inches) 4  Insertion Method: Single Shot.   Catheter: 20 G . .   .  .   .    Assessment/Narrative  Paresthesias: No.  Injection made incrementally with aspirations every 5 mL..  The placement was negative for: blood aspirated, painful injection and site bleeding.  Bolus given via needle..   Secured via.   Complications:.

## 2019-09-12 NOTE — PLAN OF CARE
VSS, afebrile, remains on room air. CMS left leg intact, block is wearing off and tingling present. Patient has ambulated with gait belt, walker, and Ax2. Cryocuff in place. Scheduled Toradol given and 10 ml PRN Hydrocodone/Acetaminophin given once per MAR. Patient rates pain 2-3/10, makes needs known. Scopolamine patch in place behind left ear. Tolerating clear liquids, hasn't eaten yet, regular diet ordered. SCD's were on when patient was in bed, removed when patient got up to chair. Ambulated in hallway with PT. Patient voided once this shift, brief on for urgency/dribbling. Lungs clear, bowel sounds active. Alarms on, call light within reach, makes needs known. Patient visiting with family at shift change.     Face to face report given with opportunity to observe patient.    Report given to Eleazar Hedrick RN   9/12/2019  3:30 PM

## 2019-09-12 NOTE — ANESTHESIA PROCEDURE NOTES
Peripheral nerve/Neuraxial procedure note : lumbar puncture  Pre-Procedure  Performed by   Referred by DR. PETTIT  Location: OR    Procedure Times:9/12/2019 7:35 AM and 9/12/2019 7:42 AM  Pre-Anesthestic Checklist: patient identified, IV checked, risks and benefits discussed, informed consent, monitors and equipment checked, pre-op evaluation, at physician/surgeon's request and post-op pain management    Timeout  Correct Patient: Yes   Correct Procedure: Yes   Correct Site: Yes   Correct Laterality: N/A   Correct Position: Yes   Site Marked: N/A   .   Procedure Documentation  ASA 3  .    Procedure:    Lumbar puncture.  Insertion Site:L3-4  (midline approach)      Patient Prep;povidone-iodine 7.5% surgical scrub.  .  Needle: Quincke Spinal Needle (gauge): 22  Spinal/LP Needle Length (inches): 3 # of attempts: 2 and  # of redirects:  2 No introducer used .       Assessment/Narrative  Paresthesias: No.  .  .  clear CSF fluid removed .

## 2019-09-12 NOTE — OP NOTE
Procedure Date: 09/12/2019      PREOPERATIVE DIAGNOSIS:  Left knee degenerative arthrosis, severe.      POSTOPERATIVE DIAGNOSIS:  Left knee degenerative arthrosis, severe.      PROCEDURE:  Left total knee replacement, Be Persona posterior stabilized.      ASSISTANT:  Dayo Hyman PA-C.      ANESTHESIA:  Spinal with adductor block.      COMPLICATIONS:  Without.      IMPLANTS:   1.  Be Persona PS size 11.   2.  11 PS poly.   3.  G tibial baseplate.   4.  35 patella.      INDICATIONS:  Ms. Dailey comes in with regards to a 8-year-old with history of left knee pain, progressive in nature, unresponsive to conservative measures.  Recommendation is for total joint reconstruction.  The patient did elect to proceed following a thorough discussion of the risks and benefits.      OPERATIVE PROCEDURE:  The patient was taken to operative suite, administered anesthesia.  Following spinal and sedation, the left lower extremity was prepped and draped in a sterile fashion.  Preoperative antibiotics were administered and timeout was called.  At this point in time, midline incision was made just superior to the superior pole of the patella down to the tubercle.  Dissection carried down to the extensor mechanism.  Medial parapatellar approach was then performed.  Medial and lateral menisci were removed.  Proper retractors were placed about the knee.  Intraoperative start point was identified and the femur drill was used to gain access.  We set our distal valgus cut at 5 degrees, performed our distal valgus cut, sized the patient up for a size.  She was between 11 and a 12, so we did translate the block 2 mm anteriorly, did an 11 block cut, anterior cut, posterior cut and chamfer cuts at this time.  Following femoral preparation extramedullary guide was utilized for the tibia, positioned in proper varus and valgus as well as anterior and posterior slope, resected roughly 2 mm off the medial aspect, performed our tibial  resection.  After that was complete, a good balance with a 10 mm block in the flexion-extension gap.  Sized patient up for a size G on the tibia, centered over the medial 1/3 of the tubercle, secured this down, used the drill followed by the keel punch, medialized the femoral component, cut our box for the PS, trialed an 11 PS poly.  Eleven showed good flexion, extension, varus and valgus plane stability.  We then resurfaced our patella and drilled for a 35 mm component.  All trial components were removed.  Final meniscal resection was carried out.  Hemostasis achieved.  Exparel injected in the posterior capsule and medial lateral gutters anteriorly, mixed cement with tobramycin, applied cement to the tibia following implantation of tibial cone, cement application in femur and implantation of the femoral component, implantation of polyethylene and cement application backside of the patella, and implantation of the patellar component.  Once the cement had cured, we copiously irrigated the knee, closed the retinacular incision with #1 Vicryl, 2-0 Vicryl, skin staples, Dermabond and Aquacel dressing.  The patient was then transferred to recovery in stable condition.      POSTOPERATIVE PLAN:  Discharged home postop day 2.  [place] 7.5, 1-2 p.o. q. 6 hours, aspirin 325 b.i.d. x 4 weeks for DVT prophylaxis, outpatient therapy at [facility], followup assessment in 2 weeks for staples out.      PHYSICIAN ASSISTANT STATEMENT:  The skilled physician assistant was necessary for completion of the procedure in a technically safe and efficient manner and was utilized in intraoperative decision making, retraction, wound closure, and setup.         JOSH PETTIT MD             D: 2019   T: 2019   MT:       Name:     RUDOLPH JOHNSON   MRN:      [id number]        Account:        [id number]   :      1955           Procedure Date: 2019      Document: [id number]

## 2019-09-12 NOTE — ANESTHESIA POSTPROCEDURE EVALUATION
Patient: Shefali Dailey    Procedure(s):  LEFT TOTAL KNEE/NITHIN    Diagnosis:LEFT KNEE PAIN  Diagnosis Additional Information: No value filed.    Anesthesia Type:  Spinal, Periph. Nerve Block for postop pain    Note:  Anesthesia Post Evaluation    Patient location during evaluation: PACU  Patient participation: Able to fully participate in evaluation  Level of consciousness: awake and alert  Pain management: adequate  Airway patency: patent  Cardiovascular status: acceptable  Respiratory status: acceptable  Hydration status: acceptable  PONV: none             Last vitals:  Vitals:    09/12/19 1300 09/12/19 1437 09/12/19 1550   BP: 125/60  125/66   Resp: 16  16   Temp: 97.2  F (36.2  C)  98.5  F (36.9  C)   SpO2: 97% 93% 94%         Electronically Signed By: RENNY Moreira CRNA  September 12, 2019  4:17 PM

## 2019-09-13 ENCOUNTER — APPOINTMENT (OUTPATIENT)
Dept: OCCUPATIONAL THERAPY | Facility: HOSPITAL | Age: 64
DRG: 470 | End: 2019-09-13
Attending: ORTHOPAEDIC SURGERY
Payer: COMMERCIAL

## 2019-09-13 ENCOUNTER — APPOINTMENT (OUTPATIENT)
Dept: PHYSICAL THERAPY | Facility: HOSPITAL | Age: 64
DRG: 470 | End: 2019-09-13
Attending: ORTHOPAEDIC SURGERY
Payer: COMMERCIAL

## 2019-09-13 VITALS
HEIGHT: 71 IN | TEMPERATURE: 98.5 F | SYSTOLIC BLOOD PRESSURE: 108 MMHG | WEIGHT: 229.28 LBS | RESPIRATION RATE: 18 BRPM | DIASTOLIC BLOOD PRESSURE: 51 MMHG | OXYGEN SATURATION: 95 % | BODY MASS INDEX: 32.1 KG/M2

## 2019-09-13 PROBLEM — Z96.652 STATUS POST TOTAL LEFT KNEE REPLACEMENT: Status: ACTIVE | Noted: 2019-09-13

## 2019-09-13 LAB
COPATH REPORT: NORMAL
GLUCOSE SERPL-MCNC: 129 MG/DL (ref 70–99)
HGB BLD-MCNC: 11.3 G/DL (ref 11.7–15.7)

## 2019-09-13 PROCEDURE — 85018 HEMOGLOBIN: CPT | Performed by: ORTHOPAEDIC SURGERY

## 2019-09-13 PROCEDURE — 97165 OT EVAL LOW COMPLEX 30 MIN: CPT | Mod: GO

## 2019-09-13 PROCEDURE — 97530 THERAPEUTIC ACTIVITIES: CPT | Mod: GP

## 2019-09-13 PROCEDURE — 25000132 ZZH RX MED GY IP 250 OP 250 PS 637: Performed by: INTERNAL MEDICINE

## 2019-09-13 PROCEDURE — 25000132 ZZH RX MED GY IP 250 OP 250 PS 637: Performed by: ORTHOPAEDIC SURGERY

## 2019-09-13 PROCEDURE — 97110 THERAPEUTIC EXERCISES: CPT | Mod: GP

## 2019-09-13 PROCEDURE — 40000275 ZZH STATISTIC RCP TIME EA 10 MIN

## 2019-09-13 PROCEDURE — 36415 COLL VENOUS BLD VENIPUNCTURE: CPT | Performed by: ORTHOPAEDIC SURGERY

## 2019-09-13 PROCEDURE — 82947 ASSAY GLUCOSE BLOOD QUANT: CPT | Performed by: ORTHOPAEDIC SURGERY

## 2019-09-13 PROCEDURE — 25000128 H RX IP 250 OP 636: Performed by: ORTHOPAEDIC SURGERY

## 2019-09-13 RX ORDER — OXYCODONE HYDROCHLORIDE 5 MG/1
5 TABLET ORAL EVERY 4 HOURS PRN
Qty: 40 TABLET | Refills: 0 | Status: SHIPPED | OUTPATIENT
Start: 2019-09-13 | End: 2019-11-18

## 2019-09-13 RX ORDER — ACETAMINOPHEN 325 MG/1
650 TABLET ORAL EVERY 4 HOURS PRN
Status: DISCONTINUED | OUTPATIENT
Start: 2019-09-13 | End: 2019-09-13 | Stop reason: HOSPADM

## 2019-09-13 RX ORDER — ASPIRIN 325 MG
325 TABLET, DELAYED RELEASE (ENTERIC COATED) ORAL 2 TIMES DAILY
Qty: 54 TABLET | Refills: 0 | COMMUNITY
Start: 2019-09-13 | End: 2019-11-18

## 2019-09-13 RX ORDER — OXYCODONE HYDROCHLORIDE 5 MG/1
5 TABLET ORAL
Status: DISCONTINUED | OUTPATIENT
Start: 2019-09-13 | End: 2019-09-13 | Stop reason: HOSPADM

## 2019-09-13 RX ORDER — ACETAMINOPHEN 325 MG/1
650 TABLET ORAL EVERY 4 HOURS PRN
COMMUNITY
Start: 2019-09-13 | End: 2021-03-12

## 2019-09-13 RX ADMIN — CEFAZOLIN SODIUM 2 G: 2 INJECTION, SOLUTION INTRAVENOUS at 00:17

## 2019-09-13 RX ADMIN — HYDROMORPHONE HYDROCHLORIDE 0.5 MG: 1 INJECTION, SOLUTION INTRAMUSCULAR; INTRAVENOUS; SUBCUTANEOUS at 04:34

## 2019-09-13 RX ADMIN — OXYCODONE HYDROCHLORIDE 5 MG: 5 TABLET ORAL at 12:54

## 2019-09-13 RX ADMIN — HYDROCODONE BITARTRATE AND ACETAMINOPHEN 1 TABLET: 5; 325 TABLET ORAL at 00:17

## 2019-09-13 RX ADMIN — KETOROLAC TROMETHAMINE 30 MG: 30 INJECTION, SOLUTION INTRAMUSCULAR; INTRAVENOUS at 00:17

## 2019-09-13 RX ADMIN — ASPIRIN 325 MG: 325 TABLET, COATED ORAL at 09:08

## 2019-09-13 RX ADMIN — OXYCODONE HYDROCHLORIDE 5 MG: 5 TABLET ORAL at 10:00

## 2019-09-13 RX ADMIN — KETOROLAC TROMETHAMINE 30 MG: 30 INJECTION, SOLUTION INTRAMUSCULAR; INTRAVENOUS at 06:18

## 2019-09-13 RX ADMIN — ACETAMINOPHEN 650 MG: 325 TABLET, FILM COATED ORAL at 12:54

## 2019-09-13 RX ADMIN — HYDROCODONE BITARTRATE AND ACETAMINOPHEN 1 TABLET: 5; 325 TABLET ORAL at 04:32

## 2019-09-13 RX ADMIN — SENNOSIDES AND DOCUSATE SODIUM 1 TABLET: 8.6; 5 TABLET ORAL at 09:08

## 2019-09-13 ASSESSMENT — ACTIVITIES OF DAILY LIVING (ADL)
ADLS_ACUITY_SCORE: 12
ADLS_ACUITY_SCORE: 14

## 2019-09-13 NOTE — PLAN OF CARE
"Reason for hospital stay:  S/P TKA  Most recent vitals: /63   Temp 96.5  F (35.8  C) (Tympanic)   Resp 18   Ht 1.791 m (5' 10.5\")   Wt 104 kg (229 lb 4.5 oz)   SpO2 95%   BMI 32.43 kg/m    Pain Management:  Norco given x2, Dilaudid x1, scheduled Toradol et Cryo cuff in place. Rates pain 7/10, states Norco not very effective, encouraged to let MD know ineffective  Orientation:  A/O  Cardiac:  WDL  Respiratory:  Lung sounds clear bilat  GI:  Bowel sounds audible et active x4  :  WDL  Skin Issues:  Dressing CDI  VF:  NS 75/hr  ABX:  Ancef  Mobility:  A1 with walker et gait belt  Safety:  A/O steady on feet    Comments:      9/13/2019  5:10 AM  Marsha Kang RN    "

## 2019-09-13 NOTE — PLAN OF CARE
Discharge Planner PT   Patient plan for discharge: Home  Current status: Gait training c FWW and SBA 200ft with good WB LLE, step-through gait. Practiced ascending/descending 10 stairs with step-to gait using bilateral rails. Instructed on and completed TKA exercise protocol   Barriers to return to prior living situation: None  Recommendations for discharge: Home with spouse. Has OP PT appointment scheduled for Monday          Entered by: Kusum Breaux 09/13/2019 2:06 PM

## 2019-09-13 NOTE — PLAN OF CARE
Discharge Planner PT   Patient plan for discharge: Home with OP PT   Current status: Bed Mobility: NA  Transfers: SBA with FWW  Gait: Pt ambulated 250' with FWW and SBA . Step-through gait and good WB LLE  Barriers to return to prior living situation: Will try stairs tomorrow   Recommendations for discharge: Home with significant other, OP PT   Rationale for recommendations: PT evaluation completed s/p L TKA. Pt tolerated session well today only requiring SBA for transfers and ambulation. Plan to try stairs tomorrow. Pt hopes to discharge home tomorrow. Will treat pt 2x/daily during her stay.        Entered by: Kusum Breaux 09/13/2019 7:23 AM

## 2019-09-13 NOTE — PLAN OF CARE
Patient discharged at 2:05 PM via wheel chair accompanied by significant other and staff. Prescriptions sent with patient to fill . All belongings sent with patient.     Discharge instructions reviewed with Patient. Listed belongings gathered and returned to patient. All belongings sent with pt.     Patient discharged to Home.     Core Measures and Vaccines  Core Measures applicable during stay: Yes. If yes, state diagnosis: L TKA  Pneumonia and Influenza given prior to discharge, if indicated: N/A    Surgical Patient   Surgical Procedures during stay: L TKA  Did patient receive discharge instruction on wound care and recognition of infection symptoms? Yes    MISC  Follow up appointment made:  Yes  Home and hospital aquired medications returned to patient: N/A  Patient reports pain was well managed at discharge: Yes

## 2019-09-13 NOTE — PLAN OF CARE
Pt A&O, VSS as charted.  Had many visitors this shift, pt very tired.  Was up in chair til this evening, gets up to commode with asst x1, gait belt and walker and is stable on feet.  CMS intact, pulses palpable.  Cryocuff in place, managing pain with scheduled Toradol, IV Dilaudid and Hydrocodone which was changed from oral solution to tablet.  SCD's on.  Infused IV Ancef and has maintenance fluids at 75 mls/hr NS.  Encouraged pt to have rest periods in between PT tomorrow and its ok to let friends know she needs to rest.  Pt makes needs known, call light in reach.   Face to face report given with opportunity to observe patient.    Report given to Marsha Gonzalez RN   9/12/2019  11:38 PM

## 2019-09-13 NOTE — PROGRESS NOTES
[facility]    Hospitalist Progress Note      Assessment & Plan   Shefali Dailey is a 64 year old female who was admitted on 9/12/2019.      1.  POD #1 status post left total knee arthroplasty: Patient is overall doing relatively well.  Did not sleep well due to discomfort in that left knee.  When she moved over to the easy chair it was markedly improved.  Did not feel that the Norco really did much of anything for her.  No nausea or vomiting.  No shortness of breath no abdominal pain.  No fevers.  Hemoglobin is down but within acceptable ranges.  Will change her to oxycodone.  Will be seeing physical therapy today.  Anticipate discharge probably tomorrow if all goes well.    2.  Anxiety: Remains on her Effexor no issues.      3.  Bladder incontinence: Remains on the Detrol.  No major complaints from her side.      Diet: Advance Diet as Tolerated: Regular Diet Adult  Fluids: iv lock    DVT Prophylaxis: Aspirin  Code Status: Full Code    Disposition: Expected discharge in 1 days once cleared by PT and pain adequately controlled.    Arturo He    Interval History   Patient alert pleasant no real distress.  Sitting up in the recliner.  States left knee was quite uncomfortable most of the night.  Once moved out of the recliner was markedly improved.  She felt just the bed was causing her to flex the wrong way.  But otherwise feels good no nausea vomiting no chest pains no shortness of breath at all.  Doing well would like to try something other than the Norco though she felt that had really no effect.  We will try some the oxycodone.  Use Tylenol between anticipate hopefully discharge tomorrow depending on how physical therapy goes.    -Data reviewed today: I reviewed all new labs and imaging results over the last 24 hours. I personally reviewed no images or EKG's today.    Physical Exam   Temp: 98.2  F (36.8  C) Temp src: Tympanic BP: (!) 111/43   Heart Rate: 68 Resp: 18 SpO2: 95 % O2 Device: None (Room  air) Oxygen Delivery: 1 LPM  Vitals:    09/12/19 0715 09/12/19 1338   Weight: 103.4 kg (228 lb) 104 kg (229 lb 4.5 oz)     Vital Signs with Ranges  Temp:  [95.5  F (35.3  C)-98.5  F (36.9  C)] 98.2  F (36.8  C)  Heart Rate:  [46-77] 68  Resp:  [10-18] 18  BP: ()/(43-70) 111/43  SpO2:  [90 %-100 %] 95 %  I/O last 3 completed shifts:  In: 3346 [P.O.:1440; I.V.:1906]  Out: 2000 [Urine:2000]    Peripheral IV 09/12/19 Right Hand (Active)   Site Assessment WDL 9/13/2019  4:00 AM   Line Status Infusing 9/13/2019  4:00 AM   Phlebitis Scale 0-->no symptoms 9/13/2019  4:00 AM   Infiltration Scale 0 9/13/2019  4:00 AM   Infiltration Site Treatment Method  None 9/12/2019  9:11 AM   Extravasation? No 9/12/2019  9:11 AM   Dressing Intervention New dressing  9/12/2019  7:15 AM   Number of days: 1       Incision/Surgical Site 09/12/19 Left Knee (Active)   Incision Assessment UT 9/13/2019  1:00 AM   Chasity-Incision Assessment [facility] 9/13/2019  1:00 AM   Closure SUSAN 9/13/2019  1:00 AM   Incision Drainage Amount UT 9/13/2019  1:00 AM   Dressing Intervention Clean, dry, intact 9/12/2019  3:56 PM   Number of days: 1     No line/device    Constitutional: Alert and oriented x3. No distress     HEENT: Normocephalic/atraumatic, PERRL, EOMI, mouth moist, neck supple, no LN.     Cardiovascular: RRR. no Murmur, no  rubs, or gallops.  JVD nno.  Bruits no.  Pulses CMS intact    Respiratory: Clear to auscultation bilaterally    Abdomen: Soft, nontender, nondistended, no organomegaly. Bowel sounds present    Extremities: Warm/dry. No edema left leg with Ace wrap CMS intact of her toes.  Neuro:   Non focal, cranial nerves intact, Moves all extremities.  Medications     sodium chloride 75 mL/hr at 09/12/19 1159       aspirin  325 mg Oral BID     scopolamine   Transdermal Once     senna-docusate  1 tablet Oral BID    Or     senna-docusate  2 tablet Oral BID     sodium chloride (PF)  3 mL Intracatheter Q8H     tolterodine ER  4 mg Oral Daily      venlafaxine  75 mg Oral Daily       Data   Recent Labs   Lab 09/13/19  0552   HGB 11.3*   *       Recent Results (from the past 24 hour(s))   XR Knee Port Left 1/2 Views    Narrative    PROCEDURE:  XR KNEE PORT LT 1/2 VW    HISTORY: postop TKR    COMPARISON:  None.    TECHNIQUE:  2 views of the left knee were obtained.    FINDINGS:  There is a knee prosthesis in place. The prosthetic  elements appear well seated. Gas is seen within the soft tissues  postoperatively.       Impression    IMPRESSION: Knee prosthesis in place      BETSY GOMEZ MD

## 2019-09-13 NOTE — PLAN OF CARE
Discharge Planner OT   Patient plan for discharge: Home with assist  Current status: SBA-Mod I for ADLs/functional mobility  Barriers to return to prior living situation: None from an OT perspective  Recommendations for discharge: Home with assist   Rationale for recommendations: Pt is post op day 1 s/p L TKA. Pt is doing well with ADLs and functional mobility completing all with SBA-Mod I. No concerns with ADLs nor home accessibility. Pt has a good support system. No further skilled OT needs identified at this time. Will complete OT order.        Entered by: More Batista 09/13/2019 10:51 AM

## 2019-09-13 NOTE — PLAN OF CARE
Pt alert and oriented x3, pleasant, makes needs known. VSS, pain controlled with p.o. Oxy and Tylenol. Ace wrap removed today, Aquacel CDI. No shadowing noted, CMS intact. Using cryo cuff. Tolerating a regular diet without any N/V. Ambulated with PT 2x today in hallway. Up with SBA /gait belt/walker. Pt hopes to discharge home today. Free from falls/ injury, call light within reach, will continue to monitor.

## 2019-09-13 NOTE — DISCHARGE INSTRUCTIONS
You have a follow up appointment scheduled with Dr. Blanchard on Thursday September 26, 2019 at 10:30am at the [facility] office. If you have any questions regarding this appointment please call the clinic at [phone].

## 2019-09-13 NOTE — PROGRESS NOTES
Inpatient Occupational Therapy Evaluation    Name: Shefali Dailey MRN# [id number]   Age: 64 year old YOB: 1955     Date of Consultation: 2019  Consultation is requested by:  Dr. Garner   Specific Consultation Request:  Post-Op Total Knee  Primary care provider: Yosef Gonzales    General Information:   Onset Date: 19    History of Current Problem/Admission: LEFT KNEE PAIN  Left knee pain    Prior Level of Function: Pt previously independent in ADLs without use of an assistive device.   Ambulation: 0 - Independent   Transferrin - Independent   Toiletin - Independent    Bathin - Independent   Dressin - Independent   Eatin - Independent   Communication: 0 - Understands/communicates without difficulty  Swallowin - swallows foods/liquids without difficulty  Cognition: 0 - no cognitive issues reported    Fall history within the last 6 months: No    Current Living Situation: Patient lives in a house with her significant other. 5 steps to enter and 17 to the second level where bedroom and bathroom are located. Bathroom has a high rise toilet seat and grab bars and a shower/tub combo with an extended tub bench and grab bars.     Current Equipment Used at Home: None but has high rise toilet seat, grab bars, and extended tub bench. Also has a commode which could be used on the main level.     Patient & Family Goals: Return home with assist and OP PT which is scheduled for Monday.      Past Medical History:   Past Medical History:   Diagnosis Date     Malignant neoplasm of breast (female), unspecified 2005       Past Surgical History:  Past Surgical History:   Procedure Laterality Date     APPENDECTOMY  2004     ARTHROPLASTY KNEE Left 2019    Procedure: LEFT TOTAL KNEE/NITHIN;  Surgeon: Mat Garner MD;  Location: [facility] OR     ARTHROSCOPY KNEE Left 2015    Procedure: ARTHROSCOPY KNEE;  Surgeon: Arden Oreilly MD;  Location: [facility] OR     bilateral  mastectomy      Bilateral, lymph nodes removed from left side     breast reconstruction  6/2006    Bilateral, expansion with saline implants     colonoscopy with biopsy  2012     DILATION AND CURETTAGE      D&C, multiple     EGD with biopsy  2012     HYSTERECTOMY TOTAL ABDOMINAL, BILATERAL SALPINGO-OOPHORECTOMY, COMBINED Bilateral 08/26/2004    no cervix, [facility]      needle loc breast biopsy  2005    LT     TONSILLECTOMY  1961       Medications:   Current Facility-Administered Medications   Medication     acetaminophen (TYLENOL) tablet 650 mg     aspirin (ASA) EC tablet 325 mg     cyclobenzaprine (FLEXERIL) tablet 10 mg     HYDROmorphone (PF) (DILAUDID) injection 0.3-0.5 mg     [START ON 9/16/2019] lidocaine (LMX4) kit     [START ON 9/16/2019] lidocaine 1 % 0.1-1 mL     naloxone (NARCAN) injection 0.1-0.4 mg     naloxone (NARCAN) injection 0.1-0.4 mg     naloxone (NARCAN) injection 0.1-0.4 mg     ondansetron (ZOFRAN-ODT) ODT tab 4 mg    Or     ondansetron (ZOFRAN) injection 4 mg     oxyCODONE (ROXICODONE) tablet 5 mg     prochlorperazine (COMPAZINE) injection 10 mg    Or     prochlorperazine (COMPAZINE) tablet 10 mg     scopolamine (TRANSDERM-SCOP) patch REMOVAL     senna-docusate (SENOKOT-S/PERICOLACE) 8.6-50 MG per tablet 1 tablet    Or     senna-docusate (SENOKOT-S/PERICOLACE) 8.6-50 MG per tablet 2 tablet     sodium chloride (PF) 0.9% PF flush 3 mL     sodium chloride (PF) 0.9% PF flush 3 mL     sodium chloride 0.9% infusion     tolterodine ER (DETROL LA) 24 hr capsule 4 mg     venlafaxine (EFFEXOR-XR) 24 hr capsule 75 mg     zolpidem (AMBIEN) tablet 5 mg       Weight Bearing Status: WBAT     Cognitive Status Examination:  Orientation: awake and alert  Level of Consciousness: alert  Follows Commands and Answers Questions: 100% of the time  Personal Safety and Judgement: Intact  Memory: Immediate recall intact and Long-term memory intact    Pain:   Currently in pain? Yes  Pain Location? left  knee  Pain Rating: 3    Occupational Therapy Evaluation:   Range of Motion: BUE's WFL   Strength: LISET's grossly 5/5  Hand Strength: bilateral gross grasp good   Muscle Tone Assessment: no deficits  Coordination: no issues observed    Mobility:   Transfer Skills: SBA sit<>stand from chair  Toilet Transfer: SBA    Balance: Good     ADLs:   Lower Body Dressing: Mod I   Grooming: SBA    IADLs:   Previous Responsibilities of the Patient: Pt and significant other share all tasks. Significant other will completes what pt cannot. Pt's daughter is also coming to stay. Pt's sister lives close by, as well.     Activities of Daily Living Analysis:   Impairments Contributing to Impaired Activities of Daily Living: pain    Occupational Therapy Interventions: Evaluation only    Clinical Impressions:  Criteria for Skilled Therapeutic Intervention Met: Evaluation Only and No problems identified which require skilled intervention  OT Diagnosis: L TKA  Influenced by the following impairments: Pain  Functional limitations due to impairment: None  Clinical presentation: Stable/Uncomplicated  Clinical presentation rationale: Clinical judgement  Clinical Decision making (complexity): Low Complexity  Frequency: 1x  Predicted Duration of Therapy Intervention (days/wks): Today  Anticipated Discharge Disposition: Home with Assist  Anticipated Equipment Needs at Discharge: None  Risks and Benefits of therapy have been explained: Yes  Patient, Family & other staff in agreement with plan of care: Yes  Clinical Impression Comments: Pt is post op day 1 s/p L TKA. Pt is doing well with ADLs and functional mobility completing all with SBA-Mod I. No concerns with ADLs nor home accessibility. Pt has a good support system. No further skilled OT needs identified at this time. Will complete OT order.     Total Eval Time: 15

## 2019-09-13 NOTE — PROGRESS NOTES
Assessment completed see flowsheet.    LOC: awake, alert    Others present: Patient     Dx: LTKA   Chronic Disease Management:     Lives with: significant other. Daughter will be staying for a while.   Living at:  Home. 5 stairs in to house. 17 to bed and bath room.  Transportation: YES  will have a ride home from family (sister or daughter) or S.O. Will have transportation to out patient therapy. Is scheduled x3 for next week.    Primary PCP: Yosef Gonzales  Insurance:  Preferred On  Medicare n/a    Support System:  Significant Other, sister, daughter.   Homecare/PCA: none  /County Services:   none    Health Care Directive: deferred    Guardian: no  POA: no    Pharmacy:    Meds management: Indpendent      Adequate Resources for needs (housing, utilities, food/med): YES  Household chores: completed prior to procedure. Has premade meals ready.       ADLs: Indpendent  Ambulation:denies concerns  Falls: none  Nutrition: adequate  Sleep: slept poorly last night due to the bed. Sat up in the reclining chair which helped with her pain.     Equipment used: none but has equipment available post discharge. Walker, crutches, cane, commode, shower chair.      Able to Return to Prior Living Arrangements: YES    Choice of Vendor: N/A     Barriers: Pain control    STEPHANIE: low    Plan: home either later today or tomorrow. Shefali is hopeful for today to sleep in her own bed tonight. States she met with PT and the stairs went well and her pain is better today.     Denies concerns for CM at this time . Contact number provided.

## 2019-09-16 ENCOUNTER — HOSPITAL ENCOUNTER (OUTPATIENT)
Dept: PHYSICAL THERAPY | Facility: HOSPITAL | Age: 64
Setting detail: THERAPIES SERIES
End: 2019-09-16
Attending: INTERNAL MEDICINE
Payer: COMMERCIAL

## 2019-09-16 DIAGNOSIS — Z96.652 S/P TOTAL KNEE ARTHROPLASTY, LEFT: ICD-10-CM

## 2019-09-16 PROCEDURE — 97110 THERAPEUTIC EXERCISES: CPT | Mod: GP | Performed by: PHYSICAL THERAPIST

## 2019-09-16 PROCEDURE — 97161 PT EVAL LOW COMPLEX 20 MIN: CPT | Mod: GP | Performed by: PHYSICAL THERAPIST

## 2019-09-16 ASSESSMENT — ACTIVITIES OF DAILY LIVING (ADL)
KNEEL ON THE FRONT OF YOUR KNEE: I AM UNABLE TO DO THE ACTIVITY
WALK: ACTIVITY IS VERY DIFFICULT
HOW_WOULD_YOU_RATE_THE_CURRENT_FUNCTION_OF_YOUR_KNEE_DURING_YOUR_USUAL_DAILY_ACTIVITIES_ON_A_SCALE_FROM_0_TO_100_WITH_100_BEING_YOUR_LEVEL_OF_KNEE_FUNCTION_PRIOR_TO_YOUR_INJURY_AND_0_BEING_THE_INABILITY_TO_PERFORM_ANY_OF_YOUR_USUAL_DAILY_ACTIVITIES?: 25
LIMPING: I DO NOT HAVE THE SYMPTOM
HOW_WOULD_YOU_RATE_THE_OVERALL_FUNCTION_OF_YOUR_KNEE_DURING_YOUR_USUAL_DAILY_ACTIVITIES?: SEVERELY ABNORMAL
RAW_SCORE: 22
STIFFNESS: THE SYMPTOM AFFECTS MY ACTIVITY SEVERELY
WEAKNESS: THE SYMPTOM AFFECTS MY ACTIVITY SEVERELY
STAND: ACTIVITY IS FAIRLY DIFFICULT
PAIN: THE SYMPTOM PREVENTS ME FROM ALL DAILY ACTIVITIES
AS_A_RESULT_OF_YOUR_KNEE_INJURY,_HOW_WOULD_YOU_RATE_YOUR_CURRENT_LEVEL_OF_DAILY_ACTIVITY?: SEVERELY ABNORMAL
SQUAT: I AM UNABLE TO DO THE ACTIVITY
KNEE_ACTIVITY_OF_DAILY_LIVING_SCORE: 31.43
GO DOWN STAIRS: ACTIVITY IS VERY DIFFICULT
RISE FROM A CHAIR: ACTIVITY IS SOMEWHAT DIFFICULT
GIVING WAY, BUCKLING OR SHIFTING OF KNEE: I DO NOT HAVE THE SYMPTOM
KNEE_ACTIVITY_OF_DAILY_LIVING_SUM: 22
SWELLING: THE SYMPTOM AFFECTS MY ACTIVITY SEVERELY
SIT WITH YOUR KNEE BENT: ACTIVITY IS VERY DIFFICULT
GO UP STAIRS: ACTIVITY IS VERY DIFFICULT

## 2019-09-16 NOTE — PROGRESS NOTES
09/16/19 4332   General Information   Type of Visit Initial OP Ortho PT Evaluation   Start of Care Date 09/16/19   Referring Physician Dr. He   Patient/Family Goals Statement To get back to work, to get back to managing stairs with ease, decrease pain.     Orders Evaluate and Treat   Date of Order 09/13/19   Certification Required? No   Medical Diagnosis s/p total knee arthroplasty, left   Surgical/Medical history reviewed Yes   Weight-Bearing Status - LLE weight-bearing as tolerated   Body Part(s)   Body Part(s) Knee   Presentation and Etiology   Pertinent history of current problem (include personal factors and/or comorbidities that impact the POC) Pt underwent L TKA on 9/12/19 by Dr. Garner.  Pt was discharged to home on Friday 9/13/19.  Pt states since being home she is not sleeping well, is making it 2-3 hours at a time without waking due to pain.  Pt states she is getting up about every hour or so to do some walking, around 100' or so at a time.  Pt is able to get dressed without much difficulty besides getting socks on/and underwear on/off.  Pt has been able to get in/out of tub with some difficulty.  Pt taking pain medication regularly.  Pt states she is using her iceman cooler at least 3-4 times per day, mostly after activity.  Pt has noticed swelling and warmth in her knee that is worse in the evenings.  Pt has been sleeping on the mainfloor due to bedroom being on second floor of home.  Pt has been using commode on main floor of home as bathroom is located on second floor of home.  Pt states pain generally is 5 or less during the day.  Pt was completely independent with all ADLs and function prior to surgery.  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  Pt has been trying to do exercises, states she has difficulty with SLR.  Pt does report that prior to surgery her ROM was pretty limited due to pain.   Impairments A. Pain;B. Decreased WB tolerance;C. Swelling;D. Decreased ROM;E.  Decreased flexibility;F. Decreased strength and endurance;G. Impaired balance;H. Impaired gait   Functional Limitations perform activities of daily living;perform required work activities;perform desired leisure / sports activities   Symptom Location left knee   How/Where did it occur From insidious onset   Onset date of current episode/exacerbation 09/12/19   Chronicity New   Pain rating (0-10 point scale) Best (/10);Worst (/10)   Best (/10) 2   Worst (/10) 10   Pain quality A. Sharp;B. Dull;C. Aching;D. Burning;E. Shooting   Frequency of pain/symptoms A. Constant   Pain/symptoms are: Other   Pain symptoms comment evenings and mornings   Pain/symptoms exacerbated by B. Walking;I. Bending;J. ADL;K. Home tasks;G. Certain positions   Pain/symptoms eased by C. Rest;E. Changing positions;F. Certain positions;H. Cold;I. OTC medication(s)   Progression of symptoms since onset: Improved   Prior Level of Function   Prior Level of Function-Mobility Independent   Prior Level of Function-ADLs Independent   Current Level of Function   Patient role/employment history A. Employed   Employment Comments LPN surgical tech   Living environment House/[facility]   Home/community accessibility 5 steps into home with railing, 17 up to 2nd floor   Current equipment-Gait/Locomotion Front wheeled walker   Current equipment-ADL Shower/tub chair   Fall Risk Screen   Fall screen completed by PT   Have you fallen 2 or more times in the past year? No   Have you fallen and had an injury in the past year? No   Is patient a fall risk? No   Knee Objective Findings   Side (if bilateral, select both right and left) Left   Observation no acute distress   Integumentary  incision covered with aquacel, moderate edema present,  Bruising noted on upper inner thigh (tourniquet region).    Posture unremarkable   Gait/Locomotion ambulates into department with FWW, antalgic gait noted   Balance/Proprioception (Single Leg Stance) NT   Knee ROM Comment ROM limited  due to pain and muscle guarding   Palpation tenderness to palpation noted around knee and into thight   Accessory Motion/Joint Mobility unable to assess patellar mobility due to aquacel    Left Knee Extension AROM 12   Left Knee Extension PROM 5   Left Knee Flexion AROM 76   Left Knee Flexion PROM 88   Left Knee Flexion Strength 4/5 limited by strength   Left Knee Extension Strength 4-/5 limited by pain   Left Hip Abduction Strength 4-/5 limited by pain   Left Quad Set Strength fair+   Left Gastrocnemius Flexibility tightness present   Left Hamstring Flexibility tightness present   Planned Therapy Interventions   Planned Therapy Interventions balance training;gait training;joint mobilization;manual therapy;neuromuscular re-education;ROM;strengthening;stretching;transfer training   Planned Modality Interventions   Planned Modality Interventions Electrical stimulation   Clinical Impression   Criteria for Skilled Therapeutic Interventions Met yes, treatment indicated   PT Diagnosis gait disturbance s/p L TKA   Influenced by the following impairments pain, decreased ROM, decreased strength, decreased balance, decreased activity tolerance   Functional limitations due to impairments decreased tolerance for transfers, decreased tolerance for walking, decreased tolerance for stairs, decreased tolerance for sleeping, decreased tolerance for work duties   Clinical Presentation Stable/Uncomplicated   Clinical Presentation Rationale clinical judgement   Clinical Decision Making (Complexity) Low complexity   Therapy Frequency 2 times/Week   Predicted Duration of Therapy Intervention (days/wks) 10 weeks   Risk & Benefits of therapy have been explained Yes   Patient, Family & other staff in agreement with plan of care Yes   Clinical Impression Comments Pt presents 4 days s/p L TKA.  Pt having increased pain post operatively but managing with medication and icing.  Pt has limited ROM and strength s/p surgery.  Pt would benefit from  skilled PT to address ROM and strength limitations and improve functional status.   Education Assessment   Preferred Learning Style Listening;Reading   Barriers to Learning No barriers   ORTHO GOALS   PT Ortho Eval Goals 1;2;3;4   Ortho Goal 1   Goal Identifier STG 1   Goal Description Pt to be independent and compliant with HEP.   Target Date 09/30/19   Ortho Goal 2   Goal Identifier LTG 1   Goal Description Pt to demonstrate improved AROM to 120 degrees to begin progression to strengthening.   Target Date 11/25/19   Ortho Goal 3   Goal Identifier LTG 2   Goal Description Pt to demonstrate improved strength of L knee to 5/5 to manage stairs with step-through pattern   Target Date 11/25/19   Ortho Goal 4   Goal Identifier LTG 3   Goal Description Pt to tolerate community distance ambulation without assistive device.   Target Date 11/25/19   Total Evaluation Time   PT Eval, Low Complexity Minutes (17774) 22      09/16/19 0960   General Information   Type of Visit Initial OP Ortho PT Evaluation   Start of Care Date 09/16/19   Referring Physician Dr. He   Patient/Family Goals Statement To get back to work, to get back to managing stairs with ease, decrease pain.     Orders Evaluate and Treat   Date of Order 09/13/19   Certification Required? No   Medical Diagnosis s/p total knee arthroplasty, left   Surgical/Medical history reviewed Yes   Weight-Bearing Status - LLE weight-bearing as tolerated   Body Part(s)   Body Part(s) Knee   Presentation and Etiology   Pertinent history of current problem (include personal factors and/or comorbidities that impact the POC) Pt underwent L TKA on 9/12/19 by Dr. Garner.  Pt was discharged to home on Friday 9/13/19.  Pt states since being home she is not sleeping well, is making it 2-3 hours at a time without waking due to pain.  Pt states she is getting up about every hour or so to do some walking, around 100' or so at a time.  Pt is able to get dressed without much difficulty  besides getting socks on/and underwear on/off.  Pt has been able to get in/out of tub with some difficulty.  Pt taking pain medication regularly.  Pt states she is using her iceman cooler at least 3-4 times per day, mostly after activity.  Pt has noticed swelling and warmth in her knee that is worse in the evenings.  Pt has been sleeping on the mainfloor due to bedroom being on second floor of home.  Pt has been using commode on main floor of home as bathroom is located on second floor of home.  Pt states pain generally is 5 or less during the day.  Pt was completely independent with all ADLs and function prior to surgery.  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  Pt has been trying to do exercises, states she has difficulty with SLR.  Pt does report that prior to surgery her ROM was pretty limited due to pain.   Impairments A. Pain;B. Decreased WB tolerance;C. Swelling;D. Decreased ROM;E. Decreased flexibility;F. Decreased strength and endurance;G. Impaired balance;H. Impaired gait   Functional Limitations perform activities of daily living;perform required work activities;perform desired leisure / sports activities   Symptom Location left knee   How/Where did it occur From insidious onset   Onset date of current episode/exacerbation 09/12/19   Chronicity New   Pain rating (0-10 point scale) Best (/10);Worst (/10)   Best (/10) 2   Worst (/10) 10   Pain quality A. Sharp;B. Dull;C. Aching;D. Burning;E. Shooting   Frequency of pain/symptoms A. Constant   Pain/symptoms are: Other   Pain symptoms comment evenings and mornings   Pain/symptoms exacerbated by B. Walking;I. Bending;J. ADL;K. Home tasks;G. Certain positions   Pain/symptoms eased by C. Rest;E. Changing positions;F. Certain positions;H. Cold;I. OTC medication(s)   Progression of symptoms since onset: Improved   Prior Level of Function   Prior Level of Function-Mobility Independent   Prior Level of Function-ADLs Independent   Current Level of  Function   Patient role/employment history A. Employed   Employment Comments LPN surgical tech   Living environment House/[facility]   Home/community accessibility 5 steps into home with railing, 17 up to 2nd floor   Current equipment-Gait/Locomotion Front wheeled walker   Current equipment-ADL Shower/tub chair   Fall Risk Screen   Fall screen completed by PT   Have you fallen 2 or more times in the past year? No   Have you fallen and had an injury in the past year? No   Is patient a fall risk? No   Knee Objective Findings   Side (if bilateral, select both right and left) Left   Observation no acute distress   Integumentary  incision covered with aquacel, moderate edema present,  Bruising noted on upper inner thigh (tourniquet region).    Posture unremarkable   Gait/Locomotion ambulates into department with FWW, antalgic gait noted   Balance/Proprioception (Single Leg Stance) NT   Knee ROM Comment ROM limited due to pain and muscle guarding   Palpation tenderness to palpation noted around knee and into thight   Accessory Motion/Joint Mobility unable to assess patellar mobility due to aquacel    Left Knee Extension AROM 12   Left Knee Extension PROM 5   Left Knee Flexion AROM 76   Left Knee Flexion PROM 88   Left Knee Flexion Strength 4/5 limited by strength   Left Knee Extension Strength 4-/5 limited by pain   Left Hip Abduction Strength 4-/5 limited by pain   Left Quad Set Strength fair+   Left Gastrocnemius Flexibility tightness present   Left Hamstring Flexibility tightness present   Planned Therapy Interventions   Planned Therapy Interventions balance training;gait training;joint mobilization;manual therapy;neuromuscular re-education;ROM;strengthening;stretching;transfer training   Planned Modality Interventions   Planned Modality Interventions Electrical stimulation   Clinical Impression   Criteria for Skilled Therapeutic Interventions Met yes, treatment indicated   PT Diagnosis gait disturbance s/p L TKA    Influenced by the following impairments pain, decreased ROM, decreased strength, decreased balance, decreased activity tolerance   Functional limitations due to impairments decreased tolerance for transfers, decreased tolerance for walking, decreased tolerance for stairs, decreased tolerance for sleeping, decreased tolerance for work duties   Clinical Presentation Stable/Uncomplicated   Clinical Presentation Rationale clinical judgement   Clinical Decision Making (Complexity) Low complexity   Therapy Frequency 2 times/Week   Predicted Duration of Therapy Intervention (days/wks) 10 weeks   Risk & Benefits of therapy have been explained Yes   Patient, Family & other staff in agreement with plan of care Yes   Clinical Impression Comments Pt presents 4 days s/p L TKA.  Pt having increased pain post operatively but managing with medication and icing.  Pt has limited ROM and strength s/p surgery.  Pt would benefit from skilled PT to address ROM and strength limitations and improve functional status.   Education Assessment   Preferred Learning Style Listening;Reading   Barriers to Learning No barriers   ORTHO GOALS   PT Ortho Eval Goals 1;2;3;4   Ortho Goal 1   Goal Identifier STG 1   Goal Description Pt to be independent and compliant with HEP.   Target Date 09/30/19   Ortho Goal 2   Goal Identifier LTG 1   Goal Description Pt to demonstrate improved AROM to 120 degrees to begin progression to strengthening.   Target Date 11/25/19   Ortho Goal 3   Goal Identifier LTG 2   Goal Description Pt to demonstrate improved strength of L knee to 5/5 to manage stairs with step-through pattern   Target Date 11/25/19   Ortho Goal 4   Goal Identifier LTG 3   Goal Description Pt to tolerate community distance ambulation without assistive device.   Target Date 11/25/19   Total Evaluation Time   PT Eval, Low Complexity Minutes (28485) 22

## 2019-09-18 ENCOUNTER — HOSPITAL ENCOUNTER (OUTPATIENT)
Dept: PHYSICAL THERAPY | Facility: HOSPITAL | Age: 64
Setting detail: THERAPIES SERIES
End: 2019-09-18
Attending: INTERNAL MEDICINE
Payer: COMMERCIAL

## 2019-09-18 PROCEDURE — 97110 THERAPEUTIC EXERCISES: CPT | Mod: GP

## 2019-09-23 ENCOUNTER — HOSPITAL ENCOUNTER (OUTPATIENT)
Dept: PHYSICAL THERAPY | Facility: HOSPITAL | Age: 64
Setting detail: THERAPIES SERIES
End: 2019-09-23
Attending: INTERNAL MEDICINE
Payer: COMMERCIAL

## 2019-09-23 PROCEDURE — 97110 THERAPEUTIC EXERCISES: CPT | Mod: GP | Performed by: PHYSICAL THERAPIST

## 2019-09-26 ENCOUNTER — TRANSFERRED RECORDS (OUTPATIENT)
Dept: HEALTH INFORMATION MANAGEMENT | Facility: CLINIC | Age: 64
End: 2019-09-26

## 2019-09-26 ENCOUNTER — HOSPITAL ENCOUNTER (OUTPATIENT)
Dept: PHYSICAL THERAPY | Facility: HOSPITAL | Age: 64
Setting detail: THERAPIES SERIES
End: 2019-09-26
Attending: FAMILY MEDICINE
Payer: COMMERCIAL

## 2019-09-26 PROCEDURE — 97110 THERAPEUTIC EXERCISES: CPT | Mod: GP | Performed by: PHYSICAL THERAPIST

## 2019-09-26 PROCEDURE — 97116 GAIT TRAINING THERAPY: CPT | Mod: GP | Performed by: PHYSICAL THERAPIST

## 2019-09-30 ENCOUNTER — HOSPITAL ENCOUNTER (OUTPATIENT)
Dept: PHYSICAL THERAPY | Facility: HOSPITAL | Age: 64
Setting detail: THERAPIES SERIES
End: 2019-09-30
Attending: FAMILY MEDICINE
Payer: COMMERCIAL

## 2019-09-30 PROCEDURE — 97110 THERAPEUTIC EXERCISES: CPT | Mod: GP

## 2019-09-30 PROCEDURE — 97116 GAIT TRAINING THERAPY: CPT | Mod: GP

## 2019-10-03 ENCOUNTER — HOSPITAL ENCOUNTER (OUTPATIENT)
Dept: PHYSICAL THERAPY | Facility: HOSPITAL | Age: 64
Setting detail: THERAPIES SERIES
End: 2019-10-03
Attending: FAMILY MEDICINE
Payer: COMMERCIAL

## 2019-10-03 PROCEDURE — 97110 THERAPEUTIC EXERCISES: CPT | Mod: GP | Performed by: PHYSICAL THERAPIST

## 2019-10-07 ENCOUNTER — HOSPITAL ENCOUNTER (OUTPATIENT)
Dept: PHYSICAL THERAPY | Facility: HOSPITAL | Age: 64
Setting detail: THERAPIES SERIES
End: 2019-10-07
Attending: FAMILY MEDICINE
Payer: COMMERCIAL

## 2019-10-07 PROCEDURE — 97110 THERAPEUTIC EXERCISES: CPT | Mod: GP

## 2019-10-10 ENCOUNTER — HOSPITAL ENCOUNTER (OUTPATIENT)
Dept: PHYSICAL THERAPY | Facility: HOSPITAL | Age: 64
Setting detail: THERAPIES SERIES
End: 2019-10-10
Attending: FAMILY MEDICINE
Payer: COMMERCIAL

## 2019-10-10 PROCEDURE — 97110 THERAPEUTIC EXERCISES: CPT | Mod: GP | Performed by: PHYSICAL THERAPIST

## 2019-10-14 ENCOUNTER — HOSPITAL ENCOUNTER (OUTPATIENT)
Dept: PHYSICAL THERAPY | Facility: HOSPITAL | Age: 64
Setting detail: THERAPIES SERIES
End: 2019-10-14
Attending: FAMILY MEDICINE
Payer: COMMERCIAL

## 2019-10-14 PROCEDURE — 97110 THERAPEUTIC EXERCISES: CPT | Mod: GP

## 2019-10-17 ENCOUNTER — HOSPITAL ENCOUNTER (OUTPATIENT)
Dept: PHYSICAL THERAPY | Facility: HOSPITAL | Age: 64
Setting detail: THERAPIES SERIES
End: 2019-10-17
Attending: FAMILY MEDICINE
Payer: COMMERCIAL

## 2019-10-17 PROCEDURE — 97110 THERAPEUTIC EXERCISES: CPT | Mod: GP | Performed by: PHYSICAL THERAPIST

## 2019-10-22 NOTE — DISCHARGE SUMMARY
Admit Date:     2019   Discharge Date:     2019      DISCHARGE DIAGNOSES:   1.  Status post left total knee arthroplasty.   2.  Anxiety.   3.  Bladder incontinence.      PROCEDURES:  Left total knee arthroplasty by Dr. Garner.      HOSPITAL COURSE:  A 64-year-old female who has had significant amount of osteoarthritis of the left knee relented and had a procedure performed by Dr. Garner.  Uncomplicated procedure.  She was monitored closely.  Hemoglobin remained stable.  She was started on aspirin b.i.d. engaged in physical therapy.  There were no complicating issues, was discharged on  AND she was discharged home.  Her vital signs showed her blood pressure 108/51.  She was afebrile, heart rate 60, sats are 95% on room air.  She was discharged home with outpatient physical therapy will be on aspirin 325 b.i.d. for 4 weeks.  Follow up in 2 weeks with Dr. Garner for wound check.      DISCHARGE MEDICATIONS:   1.  Effexor XR 75 mg daily.   2.  Detrol-LA 4 mg daily.   3.  Oxycodone 5 mg 1 every 4 hours p.r.n. pain.   4.  Tylenol 325 two tablets every 4 hours p.r.n. pain.      ACTIVITIES:  As tolerated.  She will have outpatient physical therapy.      CODE STATUS:  She is full code.      DIET:  Regular diet.         GABRIEL GAR MD             D: 10/22/2019   T: 10/22/2019   MT: KAEL      Name:     RUDOLPH JOHNSON   MRN:      [id number]        Account:        [id number]   :      1955           Admit Date:     2019                                  Discharge Date: 2019      Document: [id number]       cc: Yosef Garner MD

## 2019-10-24 ENCOUNTER — HOSPITAL ENCOUNTER (OUTPATIENT)
Dept: PHYSICAL THERAPY | Facility: HOSPITAL | Age: 64
Setting detail: THERAPIES SERIES
End: 2019-10-24
Attending: FAMILY MEDICINE
Payer: COMMERCIAL

## 2019-10-24 PROCEDURE — 97110 THERAPEUTIC EXERCISES: CPT | Mod: GP | Performed by: PHYSICAL THERAPIST

## 2019-10-28 ENCOUNTER — HOSPITAL ENCOUNTER (OUTPATIENT)
Dept: PHYSICAL THERAPY | Facility: HOSPITAL | Age: 64
Setting detail: THERAPIES SERIES
End: 2019-10-28
Attending: FAMILY MEDICINE
Payer: COMMERCIAL

## 2019-10-28 PROCEDURE — 97110 THERAPEUTIC EXERCISES: CPT | Mod: GP

## 2019-10-31 ENCOUNTER — TRANSFERRED RECORDS (OUTPATIENT)
Dept: HEALTH INFORMATION MANAGEMENT | Facility: CLINIC | Age: 64
End: 2019-10-31

## 2019-10-31 ENCOUNTER — HOSPITAL ENCOUNTER (OUTPATIENT)
Dept: PHYSICAL THERAPY | Facility: HOSPITAL | Age: 64
Setting detail: THERAPIES SERIES
End: 2019-10-31
Attending: FAMILY MEDICINE
Payer: COMMERCIAL

## 2019-10-31 PROCEDURE — 97750 PHYSICAL PERFORMANCE TEST: CPT | Mod: GP | Performed by: PHYSICAL THERAPIST

## 2019-10-31 PROCEDURE — 97110 THERAPEUTIC EXERCISES: CPT | Mod: GP | Performed by: PHYSICAL THERAPIST

## 2019-10-31 NOTE — PROGRESS NOTES
Outpatient Physical Therapy Progress Note     Patient: Shefali Dailey  : 1955    Beginning/End Dates of Reporting Period:  2019 to 10/31/2019    Referring Provider: Dr. He  (Dr. Garner -surgeon)    Therapy Diagnosis: gait disturbance s/p L TKA     Client Self Report: Pt reports she is having more pain today and states it was more painful last night as well.  Pt had been doing well with pain prior to the past couple of days.  Ascending stairs is going well but having more difficulty with descending.      Objective Measurements:  Objective Measure: L knee AROM  Details: 0-120                                    Outcome Measures (most recent score):      Goals:  Goal Identifier STG 1   Goal Description Pt to be independent and compliant with HEP.   Target Date 19   Date Met  19   Progress:     Goal Identifier LTG 1   Goal Description Pt to demonstrate improved AROM to 120 degrees to begin progression to strengthening.   Target Date 19   Date Met  10/24/19   Progress:     Goal Identifier LTG 2   Goal Description Pt to demonstrate improved strength of L knee to 5/5 to manage stairs with step-through pattern   Target Date 19   Date Met      Progress:     Goal Identifier LTG 3   Goal Description Pt to tolerate community distance ambulation without assistive device.   Target Date 19   Date Met      Progress:     Goal Identifier     Goal Description     Target Date     Date Met      Progress:     Goal Identifier     Goal Description     Target Date     Date Met      Progress:     Goal Identifier     Goal Description     Target Date     Date Met      Progress:     Goal Identifier     Goal Description     Target Date     Date Met      Progress:     Progress Toward Goals:   Progress this reporting period: Pt was making good progress with PT, ROM improved to 0-120 degrees.  However pt presents today with significant increase in posterior knee pain/burning which was not present in  the past couple of weeks.  Pt also presents with increased edema in left knee today compared to last couple of sessions.  Pt would benefit from ongoing skilled PT to progress strength for functional use of stairs and community distance ambulation.            Plan:  Continue therapy per current plan of care.    Discharge:  No

## 2019-11-04 ENCOUNTER — HOSPITAL ENCOUNTER (OUTPATIENT)
Dept: PHYSICAL THERAPY | Facility: HOSPITAL | Age: 64
Setting detail: THERAPIES SERIES
End: 2019-11-04
Attending: FAMILY MEDICINE
Payer: COMMERCIAL

## 2019-11-04 PROCEDURE — 97110 THERAPEUTIC EXERCISES: CPT | Mod: GP

## 2019-11-07 ENCOUNTER — HOSPITAL ENCOUNTER (OUTPATIENT)
Dept: PHYSICAL THERAPY | Facility: HOSPITAL | Age: 64
Setting detail: THERAPIES SERIES
End: 2019-11-07
Attending: FAMILY MEDICINE
Payer: COMMERCIAL

## 2019-11-07 PROCEDURE — 97110 THERAPEUTIC EXERCISES: CPT | Mod: GP

## 2019-11-14 ENCOUNTER — HOSPITAL ENCOUNTER (OUTPATIENT)
Dept: PHYSICAL THERAPY | Facility: HOSPITAL | Age: 64
Setting detail: THERAPIES SERIES
End: 2019-11-14
Attending: FAMILY MEDICINE
Payer: COMMERCIAL

## 2019-11-14 PROCEDURE — 97110 THERAPEUTIC EXERCISES: CPT | Mod: GP

## 2019-11-18 ENCOUNTER — HOSPITAL ENCOUNTER (EMERGENCY)
Facility: HOSPITAL | Age: 64
Discharge: HOME | End: 2019-11-18
Attending: FAMILY MEDICINE | Admitting: FAMILY MEDICINE
Payer: COMMERCIAL

## 2019-11-18 ENCOUNTER — APPOINTMENT (OUTPATIENT)
Dept: GENERAL RADIOLOGY | Facility: HOSPITAL | Age: 64
End: 2019-11-18
Attending: FAMILY MEDICINE
Payer: COMMERCIAL

## 2019-11-18 ENCOUNTER — HOSPITAL ENCOUNTER (OUTPATIENT)
Dept: MRI IMAGING | Facility: HOSPITAL | Age: 64
End: 2019-11-18
Attending: FAMILY MEDICINE
Payer: COMMERCIAL

## 2019-11-18 VITALS
DIASTOLIC BLOOD PRESSURE: 71 MMHG | RESPIRATION RATE: 22 BRPM | OXYGEN SATURATION: 99 % | SYSTOLIC BLOOD PRESSURE: 113 MMHG | HEART RATE: 99 BPM | TEMPERATURE: 95.7 F

## 2019-11-18 DIAGNOSIS — S76.192A OTHER SPECIFIED INJURY OF LEFT QUADRICEPS MUSCLE, FASCIA AND TENDON, INITIAL ENCOUNTER: ICD-10-CM

## 2019-11-18 DIAGNOSIS — S76.192A OTHER SPECIFIED INJURY OF LEFT QUADRICEPS MUSCLE, FASCIA AND TENDON, INITIAL ENCOUNTER: Primary | ICD-10-CM

## 2019-11-18 PROCEDURE — 73562 X-RAY EXAM OF KNEE 3: CPT | Mod: TC,LT

## 2019-11-18 PROCEDURE — 99283 EMERGENCY DEPT VISIT LOW MDM: CPT | Mod: Z6 | Performed by: FAMILY MEDICINE

## 2019-11-18 PROCEDURE — 99284 EMERGENCY DEPT VISIT MOD MDM: CPT | Mod: 25

## 2019-11-18 PROCEDURE — 73718 MRI LOWER EXTREMITY W/O DYE: CPT | Mod: TC,LT

## 2019-11-18 RX ORDER — CYCLOBENZAPRINE HCL 10 MG
10 TABLET ORAL 3 TIMES DAILY PRN
Qty: 30 TABLET | Refills: 0 | Status: SHIPPED | OUTPATIENT
Start: 2019-11-18 | End: 2019-11-28

## 2019-11-18 ASSESSMENT — ENCOUNTER SYMPTOMS
ABDOMINAL PAIN: 0
HEADACHES: 0
EYE REDNESS: 0
DIFFICULTY URINATING: 0
BACK PAIN: 0
NECK STIFFNESS: 0
SHORTNESS OF BREATH: 0
CONFUSION: 0
COLOR CHANGE: 0
FEVER: 0

## 2019-11-18 NOTE — ED PROVIDER NOTES
History     Chief Complaint   Patient presents with     Knee Pain     left     HPI  Shefali Dailey is a 64 year old woman with history of left TKA (9/2019) who presents after she slipped on the ice this morning while walking down her steps and hyperflexed her knee as she fell with her left foot under her buttocks. She reports difficultly walking and acute point tenderness just above the knee only when she moves her leg. She fell to her buttocks and did not strike her head. No neck pain.     Allergies:  Allergies   Allergen Reactions     Benoxinate Hcl Other (See Comments), Itching and Swelling     Burning  Fluress       Fluorescein Sodium Other (See Comments), Itching and Swelling     Burning  Fluress     Fluorescein-Benoxinate Other (See Comments)       Problem List:    Patient Active Problem List    Diagnosis Date Noted     Status post total left knee replacement 09/13/2019     Priority: Medium     Left knee pain 09/12/2019     Priority: Medium     Immune to hepatitis B 03/13/2019     Priority: Medium     ACP (advance care planning) 02/06/2013     Priority: Medium     Advance Care Planning 5/3/2017: ACP Review of Chart / Resources Provided:  Reviewed chart for advance care plan.  Shefali Dailey has been provided information and resources to begin or update their advance care plan.  Added by Shania Cobos                Past Medical History:    Past Medical History:   Diagnosis Date     Malignant neoplasm of breast (female), unspecified 11/28/2005       Past Surgical History:    Past Surgical History:   Procedure Laterality Date     APPENDECTOMY  2004     ARTHROPLASTY KNEE Left 9/12/2019    Procedure: LEFT TOTAL KNEE/NITHIN;  Surgeon: Mat Garner MD;  Location: [facility]     ARTHROSCOPY KNEE Left 4/9/2015    Procedure: ARTHROSCOPY KNEE;  Surgeon: Arden Oreilly MD;  Location: [facility]     COLONOSCOPY N/A 04/25/2012    Normal. Repeat in 10 years.      DILATION AND CURETTAGE      D&C, multiple     DISSECT  LYMPH NODE AXILLA Left 01/01/2005    Performed during bilateral mastectomy.      ESOPHAGOSCOPY, GASTROSCOPY, DUODENOSCOPY (EGD), COMBINED  2012     HYSTERECTOMY TOTAL ABDOMINAL, BILATERAL SALPINGO-OOPHORECTOMY, COMBINED Bilateral 08/26/2004    no cervix, [facility]      MAMMOPLASTY AUGMENTATION BILATERAL Bilateral 06/2006    Bilateral, expansion with saline implants     MASTECTOMY MODIFIED RADICAL, SENTINEL NODE, COMBINED Bilateral 01/01/2005    Bilateral, lymph nodes removed from left side     TONSILLECTOMY  1961      BREAST BIOPSY CORE NEEDLE LEFT Left 2005       Family History:    Family History   Problem Relation Age of Onset     Diabetes Mother      Myocardial Infarction Mother 72        myocardial infarction, cause of death     Other - See Comments Father 63        liver cirrhosis     Cerebrovascular Disease Father 64        cva, cause of death     Breast Cancer Daughter      Other - See Comments Sister         cholelithiasis     Other - See Comments Sister      Diabetes Sister         IDDM     Cancer - colorectal Sister         59     Diabetes Sister         diet controlled     Other - See Comments Sister         billary cirrhosis, stage 4       Social History:  Marital Status:   [4]  Social History     Tobacco Use     Smoking status: Current Every Day Smoker     Packs/day: 0.50     Years: 44.00     Pack years: 22.00     Types: Cigarettes     Start date: 1/1/1976     Smokeless tobacco: Never Used   Substance Use Topics     Alcohol use: Yes     Alcohol/week: 0.0 standard drinks     Comment: rarely     Drug use: No        Medications:    cyclobenzaprine (FLEXERIL) 10 MG tablet  tolterodine ER (DETROL LA) 4 MG 24 hr capsule  venlafaxine (EFFEXOR-XR) 75 MG 24 hr capsule  acetaminophen (TYLENOL) 325 MG tablet          Review of Systems   Constitutional: Negative for fever.   HENT: Negative for congestion.    Eyes: Negative for redness.   Respiratory: Negative for shortness of breath.     Cardiovascular: Negative for chest pain.   Gastrointestinal: Negative for abdominal pain.   Genitourinary: Negative for difficulty urinating.   Musculoskeletal: Negative for back pain and neck stiffness.   Skin: Negative for color change.   Neurological: Negative for headaches.   Psychiatric/Behavioral: Negative for confusion.       Physical Exam   BP: 113/71  Pulse: 99  Temp: (!) 95.7  F (35.4  C)  Resp: 22  SpO2: 99 %      Physical Exam    General: awake, alert, sitting comfortably    Head: atraumatic.    Ears: no drainage, external ears normal.    Eyes: no injection or discharge, non-icteric.    Nose: no discharge or congestion.    Mouth/Throat: moist mucous membranes.    Neck: supple, full & painless ROM.    Lungs: no retractions or acute respiratory distress. No tachypnea.    Musculoskeletal/Extremities: mild depression of skin at location of distal left quadriceps with point tenderness that reproduces the patient's described pain, acute pain with knee extension that also reproduces the patient's described pain, right leg with full & painless ROM, no lower extremity edema or gross abnormality.    Extremities: normal inspection with well-healed left TKA surgical incision without erythema, open sore or underlying fluctuance, 2+/4+ pulses bilaterally, normal & painless ROM, non-tender, joints normal.    Neurologic: CN II - XII intact, no motor/sensory deficit.    Skin: normal color, no skin rash.    ED Course     0915  Patient discussed with Dr. Arzola who agrees with MRI for further evaluation of possible quadriceps tear.    0945  MRI is not available until 1800 this evening. Patient confirms that she is able to return for the study. This MD will follow-up with the patient immediately following the study.         Procedures           Results for orders placed or performed during the hospital encounter of 11/18/19 (from the past 24 hour(s))   XR Knee Left 3 Views    Narrative    PROCEDURE:  XR KNEE LT 3  VW    HISTORY: injury    COMPARISON:  September 12, 2019    TECHNIQUE:  3 views of the left knee were obtained.    FINDINGS:  There is a knee prosthesis in place. The prosthetic  elements appear well seated. There is some soft tissue swelling seen  in the region of the distal quadriceps tendon. Quadriceps tendon  injury would be a consideration.       Impression    IMPRESSION: Soft tissue swelling in the distal quadriceps tendon  possibly quadriceps tendon tear.      BETSY GOMEZ MD       Medications - No data to display    Assessments & Plan (with Medical Decision Making)   The patient is a 64 year old woman who fell sustaining an acute left quadriceps injury.    1) Left quadriceps injury - primary concern is a left partial quadriceps tendon avulsion. Also consider quadriceps muscle  strain, tendon sprain and soft tissue/bone contusion. Patient will be treated with rest, knee immobilization and non-weight-bearing as well as alternating APAP/ibuprofen and cyclobenzaprine as needed for muscle spasms. She will undergo MRI at 1800 this evening and this MD will follow-up this study with the patient.    Plan also for PCP follow-up in 5 - 7 days regarding this ER visit. The patient verbalizes agreement with this plan as well as to immediately return to the ER with any new/worsening S/Sx.    Addendum    1845  MRI returns with:    1) High-grade vastus intermedius injury.  2) Mild vastus medius injury.  3) No gross quadriceps tendon retraction.  4) Abnormal signal within 10 cm of the central medullary space of the mid femur.    Patient is discussed with Dr. Blanchard, [facility], who notes no acute need for surgical repair and that the abnormal signal is more likely an incidental finding. He recommends follow-up with Dr. Garner who performed the TKA on 9/12/19. MD updates patient and  who had awaited the results. They agree to follow-up in 5 - 7 days with Dr. Garner.      I have reviewed the nursing  notes.    I have reviewed the findings, diagnosis, plan and need for follow up with the patient.    Discharge Medication List as of 11/18/2019 10:25 AM      START taking these medications    Details   cyclobenzaprine (FLEXERIL) 10 MG tablet Take 1 tablet (10 mg) by mouth 3 times daily as needed for muscle spasms, Disp-30 tablet, R-0, E-Prescribe             Final diagnoses:   Other specified injury of left quadriceps muscle, fascia and tendon, initial encounter       11/18/2019   [facility]     Dean Urrutia MD  11/18/19 1006       Dean Urrutia MD  11/18/19 [id number]

## 2019-11-18 NOTE — ED AVS SNAPSHOT
[facility]  [address] [address]  Phone:  [phone]                                    Shefali Dailey   MRN: [id number]    Department:  [facility]   Date of Visit:  11/18/2019           After Visit Summary Signature Page    I have received my discharge instructions, and my questions have been answered. I have discussed any challenges I see with this plan with the nurse or doctor.    ..........................................................................................................................................  Patient/Patient Representative Signature      ..........................................................................................................................................  Patient Representative Print Name and Relationship to Patient    ..................................................               ................................................  Date                                   Time    ..........................................................................................................................................  Reviewed by Signature/Title    ...................................................              ..............................................  Date                                               Time          22EPIC Rev 08/18

## 2019-11-18 NOTE — ED TRIAGE NOTES
Pt in for complaints of left knee pain after a fall this am. Reports she slipped on some ice at the bottom of her steps and her left foot slipped back to her buttocks. Pt reports hx of L tka in September.

## 2019-11-20 ENCOUNTER — TRANSFERRED RECORDS (OUTPATIENT)
Dept: HEALTH INFORMATION MANAGEMENT | Facility: CLINIC | Age: 64
End: 2019-11-20

## 2019-11-26 ENCOUNTER — HOSPITAL ENCOUNTER (OUTPATIENT)
Dept: PHYSICAL THERAPY | Facility: HOSPITAL | Age: 64
Setting detail: THERAPIES SERIES
End: 2019-11-26
Attending: INTERNAL MEDICINE
Payer: COMMERCIAL

## 2019-11-26 PROCEDURE — 97110 THERAPEUTIC EXERCISES: CPT | Mod: GP

## 2019-11-29 ENCOUNTER — HOSPITAL ENCOUNTER (OUTPATIENT)
Dept: PHYSICAL THERAPY | Facility: HOSPITAL | Age: 64
Setting detail: THERAPIES SERIES
End: 2019-11-29
Attending: INTERNAL MEDICINE
Payer: COMMERCIAL

## 2019-11-29 PROCEDURE — 97110 THERAPEUTIC EXERCISES: CPT | Mod: GP

## 2019-12-02 ENCOUNTER — HOSPITAL ENCOUNTER (OUTPATIENT)
Dept: PHYSICAL THERAPY | Facility: HOSPITAL | Age: 64
Setting detail: THERAPIES SERIES
End: 2019-12-02
Attending: INTERNAL MEDICINE
Payer: COMMERCIAL

## 2019-12-02 PROCEDURE — 97110 THERAPEUTIC EXERCISES: CPT | Mod: GP

## 2019-12-05 ENCOUNTER — HOSPITAL ENCOUNTER (OUTPATIENT)
Dept: PHYSICAL THERAPY | Facility: HOSPITAL | Age: 64
Setting detail: THERAPIES SERIES
End: 2019-12-05
Attending: INTERNAL MEDICINE
Payer: COMMERCIAL

## 2019-12-05 PROCEDURE — 97110 THERAPEUTIC EXERCISES: CPT | Mod: GP | Performed by: PHYSICAL THERAPIST

## 2019-12-05 NOTE — PROGRESS NOTES
Outpatient Physical Therapy Progress Note     Patient: Shefali Dailey  : 1955    Beginning/End Dates of Reporting Period:  2019 to 2019    Referring Provider: Dr. Zavala/Dr. Garner    Therapy Diagnosis: gait disturbance s/p L TKA     Client Self Report: Pt reports things are feeling a lot better.  States she is able to do stairs with step-through pattern but does have pain with descending stairs.  Pt still having fair amount of swelling, using ice and elevating regularly.      Objective Measurements:  L knee AROM: 5-122           Outcome Measures (most recent score):      Goals:  Goal Identifier STG 1   Goal Description Pt to be independent and compliant with HEP.   Target Date 19   Date Met  19   Progress:     Goal Identifier LTG 1   Goal Description Pt to demonstrate improved AROM to 120 degrees to begin progression to strengthening.   Target Date 19   Date Met  10/24/19   Progress:     Goal Identifier LTG 2   Goal Description Pt to demonstrate improved strength of L knee to 5/5 to manage stairs with step-through pattern   Target Date 19   Date Met      Progress:     Goal Identifier LTG 3   Goal Description Pt to tolerate community distance ambulation without assistive device.   Target Date 19   Date Met      Progress:       Progress Toward Goals:   Progress this reporting period: Since injuring knee on 19, pt has made good progress.  Initially after injury we backed exercises off to supine ROM and gentle strength to allow for tissue healing and have slowly progressed back to closed chain exercises and some weight addition of weight machines today.  Pt has been able to ambulate without assistive device in the past week.  Pt's overall strength and ROM however is not back to level it was at prior to injury.  Would recommend ongoing skilled PT to progress activity and would advise pt not return to work next week as planned as I do not believe her tolerance for  prolonged standing is at a level to tolerate full time work.            Plan:  Continue therapy per current plan of care.    Discharge:  No

## 2019-12-06 ENCOUNTER — TRANSFERRED RECORDS (OUTPATIENT)
Dept: HEALTH INFORMATION MANAGEMENT | Facility: CLINIC | Age: 64
End: 2019-12-06

## 2019-12-09 ENCOUNTER — HOSPITAL ENCOUNTER (OUTPATIENT)
Dept: PHYSICAL THERAPY | Facility: HOSPITAL | Age: 64
Setting detail: THERAPIES SERIES
End: 2019-12-09
Attending: INTERNAL MEDICINE
Payer: COMMERCIAL

## 2019-12-09 PROCEDURE — 97110 THERAPEUTIC EXERCISES: CPT | Mod: GP

## 2019-12-13 ENCOUNTER — HOSPITAL ENCOUNTER (OUTPATIENT)
Dept: PHYSICAL THERAPY | Facility: HOSPITAL | Age: 64
Setting detail: THERAPIES SERIES
End: 2019-12-13
Attending: INTERNAL MEDICINE
Payer: COMMERCIAL

## 2019-12-13 PROCEDURE — 97110 THERAPEUTIC EXERCISES: CPT | Mod: GP | Performed by: PHYSICAL THERAPIST

## 2019-12-17 ENCOUNTER — HOSPITAL ENCOUNTER (OUTPATIENT)
Dept: PHYSICAL THERAPY | Facility: HOSPITAL | Age: 64
Setting detail: THERAPIES SERIES
End: 2019-12-17
Attending: INTERNAL MEDICINE
Payer: COMMERCIAL

## 2019-12-17 PROCEDURE — 97110 THERAPEUTIC EXERCISES: CPT | Mod: GP

## 2019-12-20 ENCOUNTER — HOSPITAL ENCOUNTER (OUTPATIENT)
Dept: PHYSICAL THERAPY | Facility: HOSPITAL | Age: 64
Setting detail: THERAPIES SERIES
End: 2019-12-20
Attending: INTERNAL MEDICINE
Payer: COMMERCIAL

## 2019-12-20 PROCEDURE — 97110 THERAPEUTIC EXERCISES: CPT | Mod: GP | Performed by: PHYSICAL THERAPIST

## 2019-12-24 ENCOUNTER — HOSPITAL ENCOUNTER (OUTPATIENT)
Dept: PHYSICAL THERAPY | Facility: HOSPITAL | Age: 64
Setting detail: THERAPIES SERIES
End: 2019-12-24
Attending: INTERNAL MEDICINE
Payer: COMMERCIAL

## 2019-12-24 PROCEDURE — 97110 THERAPEUTIC EXERCISES: CPT | Mod: GP

## 2019-12-27 ENCOUNTER — HOSPITAL ENCOUNTER (OUTPATIENT)
Dept: PHYSICAL THERAPY | Facility: HOSPITAL | Age: 64
Setting detail: THERAPIES SERIES
End: 2019-12-27
Attending: INTERNAL MEDICINE
Payer: COMMERCIAL

## 2019-12-27 PROCEDURE — 97110 THERAPEUTIC EXERCISES: CPT | Mod: GP | Performed by: PHYSICAL THERAPIST

## 2019-12-27 NOTE — PROGRESS NOTES
Outpatient Physical Therapy Discharge Note     Patient: Shefali Dailey  : 1955    Beginning/End Dates of Reporting Period:  2019 to 2019    Referring Provider: Dr. Zavala/Dr. Garner    Therapy Diagnosis: gait disturbance s/p L TKA     Client Self Report: Pt reports knee is feeling pretty good.  Pt states at the end of a long day on her feet she has increased pressure and swelling but not necessarily increased pain.  Pt reports if she rests and elevates the symptoms subside rather quickly.  Pt reports stairs are going well other than the discomfort that is still present in her ankle.     Objective Measurements:   Flexion 124             Outcome Measures (most recent score):      Goals:  Goal Identifier STG 1   Goal Description Pt to be independent and compliant with HEP.   Target Date 19   Date Met  19   Progress:     Goal Identifier LTG 1   Goal Description Pt to demonstrate improved AROM to 120 degrees to begin progression to strengthening.   Target Date 19   Date Met  10/24/19   Progress:     Goal Identifier LTG 2   Goal Description Pt to demonstrate improved strength of L knee to 5/5 to manage stairs with step-through pattern   Target Date 19   Date Met  19   Progress:     Goal Identifier LTG 3   Goal Description Pt to tolerate community distance ambulation without assistive device.   Target Date 19   Date Met  19   Progress:         Progress Toward Goals:   Progress this reporting period: Pt has made good progress since injuring her knee after slipping on stairs right before Thanks.  Pt has been able to regain motion and strength has returned to functional and full level.  Pt is ambulating without assistive device and handling extended periods of time on her feet with increased swelling and stiffness.  Pt has achieved all goals and is ready to discharge from skilled PT and transition to HEP.          Plan:  Discharge from  therapy.    Discharge:    Reason for Discharge: Patient has met all goals.    Equipment Issued:     Discharge Plan: Patient to continue home program.

## 2020-01-02 ENCOUNTER — TRANSFERRED RECORDS (OUTPATIENT)
Dept: HEALTH INFORMATION MANAGEMENT | Facility: CLINIC | Age: 65
End: 2020-01-02

## 2020-02-03 ENCOUNTER — OFFICE VISIT (OUTPATIENT)
Dept: CHIROPRACTIC MEDICINE | Facility: OTHER | Age: 65
End: 2020-02-03
Attending: CHIROPRACTOR
Payer: COMMERCIAL

## 2020-02-03 DIAGNOSIS — M54.50 ACUTE RIGHT-SIDED LOW BACK PAIN WITHOUT SCIATICA: ICD-10-CM

## 2020-02-03 DIAGNOSIS — M99.02 SEGMENTAL AND SOMATIC DYSFUNCTION OF THORACIC REGION: ICD-10-CM

## 2020-02-03 DIAGNOSIS — M99.03 SEGMENTAL AND SOMATIC DYSFUNCTION OF LUMBAR REGION: Primary | ICD-10-CM

## 2020-02-03 PROCEDURE — 98940 CHIROPRACT MANJ 1-2 REGIONS: CPT | Mod: AT | Performed by: CHIROPRACTOR

## 2020-02-03 PROCEDURE — 99212 OFFICE O/P EST SF 10 MIN: CPT | Mod: 25 | Performed by: CHIROPRACTOR

## 2020-02-03 NOTE — PROGRESS NOTES
Subjective Finding:    Chief compalint: Patient presents with:  Back Pain: right chelsi  , Pain Scale: 9/10, Intensity: sharp, Duration: 2 days, Radiating: no.    Date of injury:     Activities that the pain restricts:   Home/household/hobbies/social activities: yes.  Work duties: yes.  Sleep: yes.  Makes symptoms better: rest.  Makes symptoms worse: activity.  Have you seen anyone else for the symptoms? No.  Work related: no.  Automobile related injury: no.    Objective and Assessment:    Posture Analysis:   High shoulder: left.  Head tilt: left.  High iliac crest: left.  Head carriage: neutral.  Thoracic Kyphosis: forward.  Lumbar Lordosis: neutral.    Lumbar Range of Motion: extension decreased.  Cervical Range of Motion: extension decreased.  Thoracic Range of Motion: extension decreased and left lateral flexion decreased.  Extremity Range of Motion: .    Palpation:   T paraspinals: sharp pain, no    Segmental dysfunction pre-treatment and treatment area: L56  T34.    Assessment post-treatment:  Cervical: ROM increased.  Thoracic: ROM increased.  Lumbar: ROM increased.    Comments: .      Complicating Factors: .    Procedure(s):  CMT:  99595 Chiropractic manipulative treatment 3-4 regions performed   Cervical: Diversified, See above for level, Supine, Thoracic: Diversified, See above for level, Prone and Lumbar: Diversified, See above for level, Side posture    Modalities:  None performed this visit    Therapeutic procedures:  None    Plan:  Treatment plan: PRN.  Instructed patient: stretch as instructed at visit.  Short term goals: reduce pain.  Long term goals: increase ADL.  Prognosis: very good.

## 2020-02-05 DIAGNOSIS — N39.41 URGE INCONTINENCE OF URINE: ICD-10-CM

## 2020-02-07 RX ORDER — TOLTERODINE 4 MG/1
CAPSULE, EXTENDED RELEASE ORAL
Qty: 90 CAPSULE | Refills: 0 | Status: SHIPPED | OUTPATIENT
Start: 2020-02-07 | End: 2020-08-04

## 2020-02-07 NOTE — TELEPHONE ENCOUNTER
DETROL      Last Written Prescription Date:  2-1-2019  Last Fill Quantity: 90,   # refills: 3  Last Office Visit: 9-3-2019  Future Office visit:    Next 5 appointments (look out 90 days)    Feb 10, 2020  3:20 PM CST  Return Visit with Reyes Ruby DC  [facility] [address] [place] [id number]  [phone]           Routing refill request to provider for review/approval because:

## 2020-02-24 ENCOUNTER — TELEPHONE (OUTPATIENT)
Dept: FAMILY MEDICINE | Facility: OTHER | Age: 65
End: 2020-02-24

## 2020-02-24 ENCOUNTER — ANCILLARY PROCEDURE (OUTPATIENT)
Dept: GENERAL RADIOLOGY | Facility: OTHER | Age: 65
End: 2020-02-24
Attending: NURSE PRACTITIONER
Payer: COMMERCIAL

## 2020-02-24 ENCOUNTER — OFFICE VISIT (OUTPATIENT)
Dept: FAMILY MEDICINE | Facility: OTHER | Age: 65
End: 2020-02-24
Attending: NURSE PRACTITIONER
Payer: COMMERCIAL

## 2020-02-24 VITALS
BODY MASS INDEX: 31.69 KG/M2 | HEART RATE: 88 BPM | WEIGHT: 224 LBS | OXYGEN SATURATION: 95 % | DIASTOLIC BLOOD PRESSURE: 64 MMHG | TEMPERATURE: 98.5 F | SYSTOLIC BLOOD PRESSURE: 112 MMHG

## 2020-02-24 DIAGNOSIS — R10.9 FLANK PAIN: Primary | ICD-10-CM

## 2020-02-24 DIAGNOSIS — M54.50 ACUTE LEFT-SIDED LOW BACK PAIN WITHOUT SCIATICA: ICD-10-CM

## 2020-02-24 DIAGNOSIS — M54.50 LEFT-SIDED LOW BACK PAIN WITHOUT SCIATICA, UNSPECIFIED CHRONICITY: ICD-10-CM

## 2020-02-24 LAB
ALBUMIN UR-MCNC: NEGATIVE MG/DL
APPEARANCE UR: CLEAR
BASOPHILS # BLD AUTO: 0 10E9/L (ref 0–0.2)
BASOPHILS NFR BLD AUTO: 0.2 %
BILIRUB UR QL STRIP: NEGATIVE
COLOR UR AUTO: YELLOW
DIFFERENTIAL METHOD BLD: NORMAL
EOSINOPHIL # BLD AUTO: 0.1 10E9/L (ref 0–0.7)
EOSINOPHIL NFR BLD AUTO: 1.5 %
ERYTHROCYTE [DISTWIDTH] IN BLOOD BY AUTOMATED COUNT: 14.7 % (ref 10–15)
GLUCOSE UR STRIP-MCNC: NEGATIVE MG/DL
HCT VFR BLD AUTO: 42.6 % (ref 35–47)
HGB BLD-MCNC: 14.1 G/DL (ref 11.7–15.7)
HGB UR QL STRIP: NEGATIVE
KETONES UR STRIP-MCNC: NEGATIVE MG/DL
LEUKOCYTE ESTERASE UR QL STRIP: NEGATIVE
LYMPHOCYTES # BLD AUTO: 2.5 10E9/L (ref 0.8–5.3)
LYMPHOCYTES NFR BLD AUTO: 30.9 %
MCH RBC QN AUTO: 29.7 PG (ref 26.5–33)
MCHC RBC AUTO-ENTMCNC: 33.1 G/DL (ref 31.5–36.5)
MCV RBC AUTO: 90 FL (ref 78–100)
MONOCYTES # BLD AUTO: 0.5 10E9/L (ref 0–1.3)
MONOCYTES NFR BLD AUTO: 6.3 %
NEUTROPHILS # BLD AUTO: 5 10E9/L (ref 1.6–8.3)
NEUTROPHILS NFR BLD AUTO: 61.1 %
NITRATE UR QL: NEGATIVE
PH UR STRIP: 5 PH (ref 5–7)
PLATELET # BLD AUTO: 235 10E9/L (ref 150–450)
RBC # BLD AUTO: 4.74 10E12/L (ref 3.8–5.2)
SOURCE: NORMAL
SP GR UR STRIP: >1.03 (ref 1–1.03)
UROBILINOGEN UR STRIP-ACNC: 0.2 EU/DL (ref 0.2–1)
WBC # BLD AUTO: 8.1 10E9/L (ref 4–11)

## 2020-02-24 PROCEDURE — 81003 URINALYSIS AUTO W/O SCOPE: CPT | Performed by: NURSE PRACTITIONER

## 2020-02-24 PROCEDURE — 83690 ASSAY OF LIPASE: CPT | Performed by: NURSE PRACTITIONER

## 2020-02-24 PROCEDURE — 99214 OFFICE O/P EST MOD 30 MIN: CPT | Performed by: NURSE PRACTITIONER

## 2020-02-24 PROCEDURE — 86140 C-REACTIVE PROTEIN: CPT | Performed by: NURSE PRACTITIONER

## 2020-02-24 PROCEDURE — 36415 COLL VENOUS BLD VENIPUNCTURE: CPT | Performed by: NURSE PRACTITIONER

## 2020-02-24 PROCEDURE — 85025 COMPLETE CBC W/AUTO DIFF WBC: CPT | Performed by: NURSE PRACTITIONER

## 2020-02-24 PROCEDURE — 80053 COMPREHEN METABOLIC PANEL: CPT | Performed by: NURSE PRACTITIONER

## 2020-02-24 PROCEDURE — 72100 X-RAY EXAM L-S SPINE 2/3 VWS: CPT | Mod: TC

## 2020-02-24 RX ORDER — CYCLOBENZAPRINE HCL 10 MG
10 TABLET ORAL 3 TIMES DAILY PRN
Qty: 30 TABLET | Refills: 0 | Status: SHIPPED | OUTPATIENT
Start: 2020-02-24 | End: 2021-03-04

## 2020-02-24 RX ORDER — PREDNISONE 20 MG/1
TABLET ORAL
Qty: 10 TABLET | Refills: 0 | Status: SHIPPED | OUTPATIENT
Start: 2020-02-24 | End: 2020-08-04

## 2020-02-24 ASSESSMENT — PAIN SCALES - GENERAL: PAINLEVEL: MODERATE PAIN (4)

## 2020-02-24 NOTE — TELEPHONE ENCOUNTER
9:31 AM    Reason for Call: OVERBOOK    Patient is having the following symptoms: Severe pain in left kidney area, been going on all weekend.  I work till 315 today, can leave a message with Lyn at Surgery desk # [phone]/howard request    The patient is requesting an appointment for today with Dr Gonzales.    Was an appointment offered for this call? No  If yes : Appointment type              Date    Preferred method for responding to this message: Telephone Call  What is your phone number ? can leave a message with Lyn at Surgery desk # [phone]    If we cannot reach you directly, may we leave a detailed response at the number you provided? Yes    Can this message wait until your PCP/provider returns, if unavailable today? Not applicable    Jessy Barton

## 2020-02-24 NOTE — NURSING NOTE
"Chief Complaint   Patient presents with     Musculoskeletal Problem     left lower back pain        Initial /64   Pulse 88   Temp 98.5  F (36.9  C)   Wt 101.6 kg (224 lb)   SpO2 95%   BMI 31.69 kg/m   Estimated body mass index is 31.69 kg/m  as calculated from the following:    Height as of 9/12/19: 1.791 m (5' 10.5\").    Weight as of this encounter: 101.6 kg (224 lb).  Medication Reconciliation: complete  Malgorzata Neal  "

## 2020-02-24 NOTE — PROGRESS NOTES
Subjective     Shefali Dailey is a 64 year old female who presents to clinic today for the following health issues:    HPI      Left kidney pain/left sided back pain      Duration: 3 days     Description (location/character/radiation): left lower side of back     Intensity:  4/10- best its been. Pain increases to 9-10/10    Accompanying signs and symptoms: sharp pains worse when sitting. Pain is always there.     History (similar episodes/previous evaluation): None    Precipitating or alleviating factors: None    Therapies tried and outcome: heating pads, ice, ibuprofen, flexeril (didn't do anything) positon changes, icy hot didn't make a difference    Patient declines urinary symptoms (on detrol for frequency and leaking hasn't taken for 2 weeks as she ran out). Offered a refill and she deferred. Dr. Camara is refilling    Pain has been ongoing    Denies loss of bowel or bladder control    Denies saddle paresthesia       Patient Active Problem List   Diagnosis     ACP (advance care planning)     Immune to hepatitis B     Left knee pain     Status post total left knee replacement     Past Surgical History:   Procedure Laterality Date     APPENDECTOMY  2004     ARTHROPLASTY KNEE Left 9/12/2019    Procedure: LEFT TOTAL KNEE/NITHIN;  Surgeon: Mat Garner MD;  Location: [facility]     ARTHROSCOPY KNEE Left 4/9/2015    Procedure: ARTHROSCOPY KNEE;  Surgeon: Arden Oreilly MD;  Location: [facility]     COLONOSCOPY N/A 04/25/2012    Normal. Repeat in 10 years.      DILATION AND CURETTAGE      D&C, multiple     DISSECT LYMPH NODE AXILLA Left 01/01/2005    Performed during bilateral mastectomy.      ESOPHAGOSCOPY, GASTROSCOPY, DUODENOSCOPY (EGD), COMBINED  2012     HYSTERECTOMY TOTAL ABDOMINAL, BILATERAL SALPINGO-OOPHORECTOMY, COMBINED Bilateral 08/26/2004    no cervix, [facility]      MAMMOPLASTY AUGMENTATION BILATERAL Bilateral 06/2006    Bilateral, expansion with saline implants     MASTECTOMY MODIFIED  RADICAL, SENTINEL NODE, COMBINED Bilateral 01/01/2005    Bilateral, lymph nodes removed from left side     TONSILLECTOMY  1961      BREAST BIOPSY CORE NEEDLE LEFT Left 2005       Social History     Tobacco Use     Smoking status: Current Every Day Smoker     Packs/day: 0.50     Years: 44.00     Pack years: 22.00     Types: Cigarettes     Start date: 1/1/1976     Smokeless tobacco: Never Used     Tobacco comment: doing quit plan currently 2/2020   Substance Use Topics     Alcohol use: Yes     Alcohol/week: 0.0 standard drinks     Comment: rarely     Family History   Problem Relation Age of Onset     Diabetes Mother      Myocardial Infarction Mother 72        myocardial infarction, cause of death     Other - See Comments Father 63        liver cirrhosis     Cerebrovascular Disease Father 64        cva, cause of death     Breast Cancer Daughter      Other - See Comments Sister         cholelithiasis     Other - See Comments Sister      Diabetes Sister         IDDM     Cancer - colorectal Sister         59     Diabetes Sister         diet controlled     Other - See Comments Sister         billary cirrhosis, stage 4         Current Outpatient Medications   Medication Sig Dispense Refill     acetaminophen (TYLENOL) 325 MG tablet Take 2 tablets (650 mg) by mouth every 4 hours as needed for mild pain, fever or headaches       predniSONE (DELTASONE) 20 MG tablet Take two tablets (= 40mg) each day for 5 (five) days 10 tablet 0     venlafaxine (EFFEXOR-XR) 75 MG 24 hr capsule Take 1 capsule (75 mg) by mouth daily 90 capsule 3     tolterodine ER (DETROL LA) 4 MG 24 hr capsule TAKE 1 CAPSULE BY MOUTH DAILY (Patient not taking: Reported on 2/24/2020) 90 capsule 0     Allergies   Allergen Reactions     Benoxinate Hcl Other (See Comments), Itching and Swelling     Burning  Fluress       Fluorescein Sodium Other (See Comments), Itching and Swelling     Burning  Fluress     Fluorescein-Benoxinate Other (See Comments)       Reviewed  and updated as needed this visit by Provider  Allergies  Meds  Problems  Med Hx  Surg Hx         Review of Systems   ROS COMP: Constitutional, HEENT, cardiovascular, pulmonary, gi and gu systems are negative, except as otherwise noted. +left lower back pain without radiation. -vomiting. -diarrhea. -fever      Objective    /64   Pulse 88   Temp 98.5  F (36.9  C)   Wt 101.6 kg (224 lb)   SpO2 95%   BMI 31.69 kg/m    Body mass index is 31.69 kg/m .  Physical Exam   GENERAL: healthy, alert and no distress  NECK: no adenopathy, no asymmetry, masses, or scars and thyroid normal to palpation  RESP: lungs clear to auscultation - no rales, rhonchi or wheezes  CV: regular rate and rhythm, normal S1 S2, no S3 or S4, no murmur, click or rub, no peripheral edema and peripheral pulses strong  ABDOMEN: soft, nontender, no hepatosplenomegaly, no masses and bowel sounds normal  MS: no gross musculoskeletal defects noted, no edema. No TTP or step offs to spinal column. CMS and ROM intact to all extremities. Distal pulses intact. Straight leg raises intact. Equal strength to bilateral lower extremities. No rash, erythema, bruising, or warmth to the touch. Pain is reproducible to left paraspinal muscles at L3-L5 region. Negative bilateral CVA tenderness  SKIN: no suspicious lesions or rashes  NEURO: Normal strength and tone, mentation intact and speech normal  BACK: no CVA tenderness, no paralumbar tenderness  PSYCH: mentation appears normal, affect normal/bright    Diagnostic Test Results:  Labs reviewed in Epic      Results for orders placed or performed in visit on 02/24/20   XR LUMBAR SPINE 2/3 VIEWS (Clinic Performed)     Status: None    Narrative    Exam: XR LUMBAR SPINE 2-3 VIEWS     History:Female, age 64 years, Acute left-sided low back pain without  sciatica    Comparison:  Pelvic x-rays and left hip x-rays 10/31/2018    Technique: Three views are submitted.    Findings: Bones are normally mineralized. No  evidence of acute or  subacute fracture. No evidence of acute subluxation. Moderately severe  to severe endplate and facet joint degenerative changes are again seen  at the lumbosacral junction.           Impression    Impression:  No evidence of acute or subacute bony abnormality.     Moderate to severe degenerative changes in the lumbosacral junction.    Atherosclerotic calcifications of the abdominal aorta.    MELLO GONZALEZ MD   Results for orders placed or performed in visit on 02/24/20   CBC with platelets and differential     Status: None   Result Value Ref Range    WBC 8.1 4.0 - 11.0 10e9/L    RBC Count 4.74 3.8 - 5.2 10e12/L    Hemoglobin 14.1 11.7 - 15.7 g/dL    Hematocrit 42.6 35.0 - 47.0 %    MCV 90 78 - 100 fl    MCH 29.7 26.5 - 33.0 pg    MCHC 33.1 31.5 - 36.5 g/dL    RDW 14.7 10.0 - 15.0 %    Platelet Count 235 150 - 450 10e9/L    % Neutrophils 61.1 %    % Lymphocytes 30.9 %    % Monocytes 6.3 %    % Eosinophils 1.5 %    % Basophils 0.2 %    Absolute Neutrophil 5.0 1.6 - 8.3 10e9/L    Absolute Lymphocytes 2.5 0.8 - 5.3 10e9/L    Absolute Monocytes 0.5 0.0 - 1.3 10e9/L    Absolute Eosinophils 0.1 0.0 - 0.7 10e9/L    Absolute Basophils 0.0 0.0 - 0.2 10e9/L    Diff Method Automated Method    *UA reflex to Microscopic and Culture - [facility]     Status: None   Result Value Ref Range    Color Urine Yellow     Appearance Urine Clear     Glucose Urine Negative NEG^Negative mg/dL    Bilirubin Urine Negative NEG^Negative    Ketones Urine Negative NEG^Negative mg/dL    Specific Gravity Urine >1.030 1.003 - 1.035    Blood Urine Negative NEG^Negative    pH Urine 5.0 5.0 - 7.0 pH    Protein Albumin Urine Negative NEG^Negative mg/dL    Urobilinogen Urine 0.2 0.2 - 1.0 EU/dL    Nitrite Urine Negative NEG^Negative    Leukocyte Esterase Urine Negative NEG^Negative    Source Midstream Urine        Assessment & Plan   Assessment    She's frustrated that her labs and urine are always normal. The pain she feels  "in her back is very intense at times. She feels like more is going on. Labs and urine are normal today. Lumbar XRAY shows moderate to severe degenerative changes to lumbar sacral region.     Plan  (R10.9) Flank pain  (primary encounter diagnosis)  Comment: check labs and urine  Plan: CBC with platelets and differential,         Comprehensive metabolic panel, *UA reflex to         Microscopic and Culture - [facility],             (M54.5) Acute left-sided low back pain without sciatica  Comment: check labs and xray. Add prednisone and update XRAY  Plan: predniSONE (DELTASONE) 20 MG tablet,         cyclobenzaprine (FLEXERIL) 10 MG tablet,         XR LUMBAR SPINE G/E 4 VW (Clinic         Performed)            (M54.5) Left-sided low back pain without sciatica, unspecified chronicity  Comment: with moderate to severe degeneration. Obtain MRI of lumbar spine  Plan: MR Lumbar Spine w/o Contrast              Tobacco Cessation:   reports that she has been smoking cigarettes. She started smoking about 44 years ago. She has a 22.00 pack-year smoking history. She has never used smokeless tobacco.  Tobacco Cessation Action Plan: Information offered: Patient not interested at this time      BMI:   Estimated body mass index is 31.69 kg/m  as calculated from the following:    Height as of 9/12/19: 1.791 m (5' 10.5\").    Weight as of this encounter: 101.6 kg (224 lb).   Weight management plan: Discussed healthy diet and exercise guidelines        See Patient Instructions    Return if symptoms worsen or fail to improve.    Tala Brady NP  [facility]        "

## 2020-02-25 LAB
ALBUMIN SERPL-MCNC: 4.2 G/DL (ref 3.4–5)
ALP SERPL-CCNC: 89 U/L (ref 40–150)
ALT SERPL W P-5'-P-CCNC: 25 U/L (ref 0–50)
ANION GAP SERPL CALCULATED.3IONS-SCNC: 9 MMOL/L (ref 3–14)
AST SERPL W P-5'-P-CCNC: 17 U/L (ref 0–45)
BILIRUB SERPL-MCNC: 0.2 MG/DL (ref 0.2–1.3)
BUN SERPL-MCNC: 28 MG/DL (ref 7–30)
CALCIUM SERPL-MCNC: 9.3 MG/DL (ref 8.5–10.1)
CHLORIDE SERPL-SCNC: 108 MMOL/L (ref 94–109)
CO2 SERPL-SCNC: 24 MMOL/L (ref 20–32)
CREAT SERPL-MCNC: 0.72 MG/DL (ref 0.52–1.04)
CRP SERPL-MCNC: <2.9 MG/L (ref 0–8)
GFR SERPL CREATININE-BSD FRML MDRD: 89 ML/MIN/{1.73_M2}
GLUCOSE SERPL-MCNC: 99 MG/DL (ref 70–99)
LIPASE SERPL-CCNC: 105 U/L (ref 73–393)
POTASSIUM SERPL-SCNC: 4 MMOL/L (ref 3.4–5.3)
PROT SERPL-MCNC: 7.5 G/DL (ref 6.8–8.8)
SODIUM SERPL-SCNC: 141 MMOL/L (ref 133–144)

## 2020-02-27 ENCOUNTER — HOSPITAL ENCOUNTER (OUTPATIENT)
Dept: MRI IMAGING | Facility: HOSPITAL | Age: 65
Discharge: HOME | End: 2020-02-27
Attending: NURSE PRACTITIONER | Admitting: NURSE PRACTITIONER
Payer: COMMERCIAL

## 2020-02-27 DIAGNOSIS — M54.50 LEFT-SIDED LOW BACK PAIN WITHOUT SCIATICA, UNSPECIFIED CHRONICITY: ICD-10-CM

## 2020-02-27 PROCEDURE — 72148 MRI LUMBAR SPINE W/O DYE: CPT | Mod: TC

## 2020-03-02 ENCOUNTER — TELEPHONE (OUTPATIENT)
Dept: FAMILY MEDICINE | Facility: OTHER | Age: 65
End: 2020-03-02

## 2020-03-02 ENCOUNTER — HEALTH MAINTENANCE LETTER (OUTPATIENT)
Age: 65
End: 2020-03-02

## 2020-03-02 DIAGNOSIS — M54.50 ACUTE LEFT-SIDED LOW BACK PAIN WITHOUT SCIATICA: Primary | ICD-10-CM

## 2020-03-02 DIAGNOSIS — M48.061 SPINAL STENOSIS OF LUMBAR REGION, UNSPECIFIED WHETHER NEUROGENIC CLAUDICATION PRESENT: ICD-10-CM

## 2020-03-02 NOTE — TELEPHONE ENCOUNTER
neurosurgery consult is pending. Patient agrees to recommendation. Please advise.     Malgorzata MENA LPN

## 2020-03-11 DIAGNOSIS — Z11.59 NEED FOR HEPATITIS C SCREENING TEST: ICD-10-CM

## 2020-03-11 DIAGNOSIS — Z13.220 LIPID SCREENING: Primary | ICD-10-CM

## 2020-03-11 DIAGNOSIS — Z11.4 SCREENING FOR HIV (HUMAN IMMUNODEFICIENCY VIRUS): ICD-10-CM

## 2020-03-20 DIAGNOSIS — F43.0 ACUTE REACTION TO STRESS: ICD-10-CM

## 2020-03-20 NOTE — TELEPHONE ENCOUNTER
Pt called asking for a refill on her Effexor. She was told she had to have an appointment for a physical in order to get more meds, but the appointment was canceled due to covid. She was rescheduled for April 1st but only has 6 of the pills left. Was wondering if she could do a phone visit instead.    Michelle Morales LPN

## 2020-03-23 RX ORDER — VENLAFAXINE HYDROCHLORIDE 75 MG/1
75 CAPSULE, EXTENDED RELEASE ORAL DAILY
Qty: 90 CAPSULE | Refills: 3 | Status: SHIPPED | OUTPATIENT
Start: 2020-03-23 | End: 2021-03-04

## 2020-03-23 NOTE — PROGRESS NOTES
Outpatient Physical Therapy Discharge Note     Patient: Sheafli Dailey  : 1955    Beginning/End Dates of Reporting Period:  2019 to 3/23/2020    Referring Provider: Dr. He    Therapy Diagnosis: gait disturbance s/p L TKA     Client Self Report: Pt reports knee is feeling pretty good.  Pt states at the end of a long day on her feet she has increased pressure and swelling but not necessarily increased pain.  Pt reports if she rests and elevates the symptoms subside rather quickly.  Pt reports stairs are going well other than the discomfort that is still present in her ankle.     Objective Measurements:          Outcome Measures (most recent score):      Goals:  Goal Identifier STG 1   Goal Description Pt to be independent and compliant with HEP.   Target Date 19   Date Met  19   Progress:     Goal Identifier LTG 1   Goal Description Pt to demonstrate improved AROM to 120 degrees to begin progression to strengthening.   Target Date 19   Date Met  10/24/19   Progress:     Goal Identifier LTG 2   Goal Description Pt to demonstrate improved strength of L knee to 5/5 to manage stairs with step-through pattern   Target Date 19   Date Met  19   Progress:     Goal Identifier LTG 3   Goal Description Pt to tolerate community distance ambulation without assistive device.   Target Date 19   Date Met  19   Progress:         Progress Toward Goals:   Progress this reporting period: Pt has made good progress since injuring her knee after slipping on stairs right before Thanks.  Pt has been able to regain motion and strength has returned to functional and full level.  Pt is ambulating without assistive device and handling extended periods of time on her feet with increased swelling and stiffness.  Pt has achieved all goals and is ready to discharge from skilled PT and transition to HEP.          Plan:  Discharge from therapy.    Discharge:    Reason for Discharge:  Patient has met all goals.    Equipment Issued:     Discharge Plan: Patient to continue home program.

## 2020-07-15 ENCOUNTER — TRANSFERRED RECORDS (OUTPATIENT)
Dept: HEALTH INFORMATION MANAGEMENT | Facility: CLINIC | Age: 65
End: 2020-07-15

## 2020-07-23 DIAGNOSIS — R10.9 FLANK PAIN: Primary | ICD-10-CM

## 2020-08-04 ENCOUNTER — OFFICE VISIT (OUTPATIENT)
Dept: UROLOGY | Facility: OTHER | Age: 65
End: 2020-08-04
Attending: UROLOGY
Payer: COMMERCIAL

## 2020-08-04 VITALS
DIASTOLIC BLOOD PRESSURE: 68 MMHG | TEMPERATURE: 97.2 F | RESPIRATION RATE: 16 BRPM | SYSTOLIC BLOOD PRESSURE: 142 MMHG | HEART RATE: 86 BPM | OXYGEN SATURATION: 96 %

## 2020-08-04 DIAGNOSIS — R10.9 FLANK PAIN: ICD-10-CM

## 2020-08-04 DIAGNOSIS — N39.41 URGENCY INCONTINENCE: Primary | ICD-10-CM

## 2020-08-04 LAB
ALBUMIN UR-MCNC: NEGATIVE MG/DL
APPEARANCE UR: CLEAR
BILIRUB UR QL STRIP: NEGATIVE
COLOR UR AUTO: NORMAL
GLUCOSE UR STRIP-MCNC: NEGATIVE MG/DL
HGB UR QL STRIP: NEGATIVE
KETONES UR STRIP-MCNC: NEGATIVE MG/DL
LEUKOCYTE ESTERASE UR QL STRIP: NEGATIVE
NITRATE UR QL: NEGATIVE
PH UR STRIP: 6.5 PH (ref 4.7–8)
SOURCE: NORMAL
SP GR UR STRIP: 1.02 (ref 1–1.03)
UROBILINOGEN UR STRIP-MCNC: NORMAL MG/DL (ref 0–2)

## 2020-08-04 PROCEDURE — 88108 CYTOPATH CONCENTRATE TECH: CPT | Mod: TC | Performed by: UROLOGY

## 2020-08-04 PROCEDURE — 99203 OFFICE O/P NEW LOW 30 MIN: CPT | Performed by: UROLOGY

## 2020-08-04 PROCEDURE — 81003 URINALYSIS AUTO W/O SCOPE: CPT | Performed by: UROLOGY

## 2020-08-04 RX ORDER — MIRABEGRON 25 MG/1
25 TABLET, FILM COATED, EXTENDED RELEASE ORAL DAILY
Qty: 30 TABLET | Refills: 11 | Status: SHIPPED | OUTPATIENT
Start: 2020-08-04 | End: 2020-09-01

## 2020-08-04 ASSESSMENT — PAIN SCALES - GENERAL: PAINLEVEL: NO PAIN (0)

## 2020-08-04 ASSESSMENT — ENCOUNTER SYMPTOMS: BACK PAIN: 1

## 2020-08-04 NOTE — PROGRESS NOTES
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  Shefali tells me that about 2 years ago she had a severe onset of left back pain.  She ended up having a MRI of her lower back that shows a multitude of disc protrusions, arthritic changes with some canal stenosis.  She saw a neurosurgeon who questioned some of her overactive bladder symptoms could possibly be from her back.  SHe denies any other radicular pain.  She does not have any numbness or tingling in her lower leg foot or ankle, no weakness in her lower extremities.  She did see Dr. Camara at one point and he put her on Detrol and she did not get any improvement with that.  She is at this point wearing depends because she will have huge leaks with an urge and she has absolutely no control she just cannot make it to the toilet.  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  She is up 2 times a night she is not wetting her sleep but when she wakes up she can barely make it to the toilet and sometimes she will leak on the way to the toilet.  She denies any leakage with coughing or sneezing.  She had a total abdominal hysterectomy at age 49 for fibroids and has had 2 vaginal deliveries.  No history of urinary tract infections or blood in the urine.    Allergies:    Allergies   Allergen Reactions     Benoxinate Hcl Other (See Comments), Itching and Swelling     Burning  Fluress       Fluorescein Sodium Other (See Comments), Itching and Swelling     Burning  Fluress     Fluorescein-Benoxinate Other (See Comments)        Medications:      acetaminophen (TYLENOL) 325 MG tablet  mirabegron (MYRBETRIQ) 25 MG 24 hr tablet  venlafaxine (EFFEXOR-XR) 75 MG 24 hr  capsule  cyclobenzaprine (FLEXERIL) 10 MG tablet        Problem List:      Patient Active Problem List    Diagnosis Date Noted     Status post total left knee replacement 09/13/2019     Priority: Medium     Left knee pain 09/12/2019     Priority: Medium     Immune to hepatitis B 03/13/2019     Priority: Medium     ACP (advance care planning) 02/06/2013     Priority: Medium     Advance Care Planning 5/3/2017: ACP Review of Chart / Resources Provided:  Reviewed chart for advance care plan.  Shefali Dailey has been provided information and resources to begin or update their advance care plan.  Added by Shania Cobos                Past Medical History:      Past Medical History:   Diagnosis Date     Malignant neoplasm of breast (female), unspecified 11/28/2005       Past Surgical History:      Past Surgical History:   Procedure Laterality Date     APPENDECTOMY  2004     ARTHROPLASTY KNEE Left 9/12/2019    Procedure: LEFT TOTAL KNEE/NITHIN;  Surgeon: Mat Garner MD;  Location: [facility]     ARTHROSCOPY KNEE Left 4/9/2015    Procedure: ARTHROSCOPY KNEE;  Surgeon: Arden Oreilly MD;  Location: [facility]     COLONOSCOPY N/A 04/25/2012    Normal. Repeat in 10 years.      DILATION AND CURETTAGE      D&C, multiple     DISSECT LYMPH NODE AXILLA Left 01/01/2005    Performed during bilateral mastectomy.      ESOPHAGOSCOPY, GASTROSCOPY, DUODENOSCOPY (EGD), COMBINED  2012     HYSTERECTOMY TOTAL ABDOMINAL, BILATERAL SALPINGO-OOPHORECTOMY, COMBINED Bilateral 08/26/2004    no cervix, [facility]      MAMMOPLASTY AUGMENTATION BILATERAL Bilateral 06/2006    Bilateral, expansion with saline implants     MASTECTOMY MODIFIED RADICAL, SENTINEL NODE, COMBINED Bilateral 01/01/2005    Bilateral, lymph nodes removed from left side     TONSILLECTOMY  1961      BREAST BIOPSY CORE NEEDLE LEFT Left 2005       Family History:      Family History   Problem Relation Age of Onset     Diabetes Mother      Myocardial Infarction  Mother 72        myocardial infarction, cause of death     Other - See Comments Father 63        liver cirrhosis     Cerebrovascular Disease Father 64        cva, cause of death     Breast Cancer Daughter      Other - See Comments Sister         cholelithiasis     Other - See Comments Sister      Diabetes Sister         IDDM     Cancer - colorectal Sister         59     Diabetes Sister         diet controlled     Other - See Comments Sister         billary cirrhosis, stage 4       Social History:    Marital Status:   [4]  Social History     Tobacco Use     Smoking status: Current Every Day Smoker     Packs/day: 0.50     Years: 44.00     Pack years: 22.00     Types: Cigarettes     Start date: 1/1/1976     Smokeless tobacco: Never Used   Substance Use Topics     Alcohol use: Yes     Alcohol/week: 0.0 standard drinks     Comment: rarely     Drug use: No        Review of Systems   Genitourinary: Positive for urgency.   Musculoskeletal: Positive for back pain.   All other systems reviewed and are negative.        Physical Exam   Vitals:  BP (!) 142/68   Pulse 86   Temp 97.2  F (36.2  C) (Tympanic)   Resp 16   SpO2 96%       Physical Exam  Constitutional:       Appearance: Normal appearance.   Abdominal:      General: Abdomen is flat.      Palpations: Abdomen is soft. There is mass.      Tenderness: There is abdominal tenderness in the suprapubic area.      Hernia: A hernia is present.          Comments: Pfannenstiel incision, some suprapubic tenderness   Genitourinary:     Comments: Normal-appearing urethral meatus, mild cystocele possibly a grade 1-2.  Urethral descensus with cough but no leakage.  Poor to moderate pelvic floor contraction.  External rectal area is normal normal rectal tone no rectal masses and no significant rectocele.  Neurological:      Mental Status: She is alert.         Void residual by bladder scan was 71 mL    Impression: Urge incontinence  Plan   Plan: I suspect Shefali's urge  incontinence is non-neurogenic  given the fact that she does not have any radicular symptoms or numbness or weakness in her lower extremities. S He currently denies any back pain at this time.  I discussed the treatment options for neurogenic versus nonneurogenic overactive bladder symptoms and they are often the same.  Physical therapy is first-line treatment, medications with anticholinergics versus beta 3 agonists is the second line treatment and third line treatment options are Botox injections or neuromodulation.  At this point Shefali has tried 1 anticholinergic without success.  We are going to refer her to physical therapy and I discussed trialing her on Myrbetriq.  We discussed the side effects of Myrbetriq which most often are hypertension and the other limiting factor with Myrbetriq is often the cost.  She will let me know if she is having any problems with the Myrbetriq and we may need to switch her to a generic anticholinergic.  Follow-up in 2 months if no better or as needed if she is doing well.      No follow-ups on file.    Isadora Shin MD  [facility]

## 2020-08-04 NOTE — NURSING NOTE
"Chief Complaint   Patient presents with     Consult     Flank pain.       Initial BP (!) 142/68   Pulse 86   Temp 97.2  F (36.2  C) (Tympanic)   Resp 16   SpO2 96%  Estimated body mass index is 31.69 kg/m  as calculated from the following:    Height as of 9/12/19: 1.791 m (5' 10.5\").    Weight as of 2/24/20: 101.6 kg (224 lb).  Medication Reconciliation: complete  Rebekah Ledesma LPN  Review of Systems:    Weight loss:    No     Recent fever/chills:  No   Night sweats:   No  Current skin rash:  No   Recent hair loss:  No  Heat intolerance:  No   Cold intolerance:  No  Chest pain:   No   Palpitations:   No  Shortness of breath:  No   Wheezing:   No  Constipation:    No   Diarrhea:   No   Nausea:   No   Vomiting:   No   Kidney/side pain:  No   Back pain:   yes  Frequent headaches:  No   Dizziness:     No  Leg swelling:   No   Calf pain:    No    Parents, brothers or sisters with history of kidney cancer:   No  Parents, brothers or sisters with history of bladder cancer: No    "

## 2020-08-05 LAB — COPATH REPORT: NORMAL

## 2020-08-31 DIAGNOSIS — Z11.59 NEED FOR HEPATITIS C SCREENING TEST: ICD-10-CM

## 2020-08-31 DIAGNOSIS — Z11.4 SCREENING FOR HIV (HUMAN IMMUNODEFICIENCY VIRUS): ICD-10-CM

## 2020-08-31 DIAGNOSIS — Z13.220 LIPID SCREENING: ICD-10-CM

## 2020-08-31 LAB
ALBUMIN SERPL-MCNC: 3.8 G/DL (ref 3.4–5)
ALP SERPL-CCNC: 85 U/L (ref 40–150)
ALT SERPL W P-5'-P-CCNC: 23 U/L (ref 0–50)
ANION GAP SERPL CALCULATED.3IONS-SCNC: 4 MMOL/L (ref 3–14)
AST SERPL W P-5'-P-CCNC: 16 U/L (ref 0–45)
BILIRUB SERPL-MCNC: 0.4 MG/DL (ref 0.2–1.3)
BUN SERPL-MCNC: 21 MG/DL (ref 7–30)
CALCIUM SERPL-MCNC: 8.4 MG/DL (ref 8.5–10.1)
CHLORIDE SERPL-SCNC: 110 MMOL/L (ref 94–109)
CHOLEST SERPL-MCNC: 234 MG/DL
CO2 SERPL-SCNC: 27 MMOL/L (ref 20–32)
CREAT SERPL-MCNC: 0.66 MG/DL (ref 0.52–1.04)
GFR SERPL CREATININE-BSD FRML MDRD: >90 ML/MIN/{1.73_M2}
GLUCOSE SERPL-MCNC: 123 MG/DL (ref 70–99)
HDLC SERPL-MCNC: 40 MG/DL
LDLC SERPL CALC-MCNC: 150 MG/DL
NONHDLC SERPL-MCNC: 194 MG/DL
POTASSIUM SERPL-SCNC: 4 MMOL/L (ref 3.4–5.3)
PROT SERPL-MCNC: 7.2 G/DL (ref 6.8–8.8)
SODIUM SERPL-SCNC: 141 MMOL/L (ref 133–144)
TRIGL SERPL-MCNC: 221 MG/DL

## 2020-08-31 PROCEDURE — 86803 HEPATITIS C AB TEST: CPT | Performed by: FAMILY MEDICINE

## 2020-08-31 PROCEDURE — 80053 COMPREHEN METABOLIC PANEL: CPT | Performed by: FAMILY MEDICINE

## 2020-08-31 PROCEDURE — 80061 LIPID PANEL: CPT | Performed by: FAMILY MEDICINE

## 2020-08-31 PROCEDURE — 36415 COLL VENOUS BLD VENIPUNCTURE: CPT | Performed by: FAMILY MEDICINE

## 2020-08-31 PROCEDURE — 87389 HIV-1 AG W/HIV-1&-2 AB AG IA: CPT | Performed by: FAMILY MEDICINE

## 2020-09-01 ENCOUNTER — OFFICE VISIT (OUTPATIENT)
Dept: FAMILY MEDICINE | Facility: OTHER | Age: 65
End: 2020-09-01
Attending: FAMILY MEDICINE
Payer: COMMERCIAL

## 2020-09-01 VITALS
HEIGHT: 70 IN | SYSTOLIC BLOOD PRESSURE: 122 MMHG | OXYGEN SATURATION: 97 % | TEMPERATURE: 98.7 F | HEART RATE: 77 BPM | DIASTOLIC BLOOD PRESSURE: 60 MMHG | BODY MASS INDEX: 32.21 KG/M2 | WEIGHT: 225 LBS

## 2020-09-01 DIAGNOSIS — M25.562 CHRONIC PAIN OF LEFT KNEE: ICD-10-CM

## 2020-09-01 DIAGNOSIS — M48.061 SPINAL STENOSIS, LUMBAR REGION, WITHOUT NEUROGENIC CLAUDICATION: ICD-10-CM

## 2020-09-01 DIAGNOSIS — Z11.4 SCREENING FOR HIV (HUMAN IMMUNODEFICIENCY VIRUS): Primary | ICD-10-CM

## 2020-09-01 DIAGNOSIS — Z72.0 TOBACCO ABUSE: ICD-10-CM

## 2020-09-01 DIAGNOSIS — L98.9 SKIN LESION: ICD-10-CM

## 2020-09-01 DIAGNOSIS — Z00.00 ROUTINE GENERAL MEDICAL EXAMINATION AT A HEALTH CARE FACILITY: ICD-10-CM

## 2020-09-01 DIAGNOSIS — Z11.59 NEED FOR HEPATITIS C SCREENING TEST: ICD-10-CM

## 2020-09-01 DIAGNOSIS — G89.29 CHRONIC PAIN OF LEFT KNEE: ICD-10-CM

## 2020-09-01 DIAGNOSIS — R73.09 ELEVATED GLUCOSE LEVEL: ICD-10-CM

## 2020-09-01 DIAGNOSIS — Z13.220 LIPID SCREENING: ICD-10-CM

## 2020-09-01 LAB
HCV AB SERPL QL IA: NONREACTIVE
HIV 1+2 AB+HIV1 P24 AG SERPL QL IA: NONREACTIVE

## 2020-09-01 PROCEDURE — 99396 PREV VISIT EST AGE 40-64: CPT | Performed by: FAMILY MEDICINE

## 2020-09-01 ASSESSMENT — ANXIETY QUESTIONNAIRES
7. FEELING AFRAID AS IF SOMETHING AWFUL MIGHT HAPPEN: NOT AT ALL
5. BEING SO RESTLESS THAT IT IS HARD TO SIT STILL: NOT AT ALL
3. WORRYING TOO MUCH ABOUT DIFFERENT THINGS: NOT AT ALL
6. BECOMING EASILY ANNOYED OR IRRITABLE: NOT AT ALL
2. NOT BEING ABLE TO STOP OR CONTROL WORRYING: NOT AT ALL
1. FEELING NERVOUS, ANXIOUS, OR ON EDGE: NOT AT ALL
GAD7 TOTAL SCORE: 0

## 2020-09-01 ASSESSMENT — PATIENT HEALTH QUESTIONNAIRE - PHQ9: 5. POOR APPETITE OR OVEREATING: NOT AT ALL

## 2020-09-01 ASSESSMENT — PAIN SCALES - GENERAL: PAINLEVEL: NO PAIN (0)

## 2020-09-01 ASSESSMENT — MIFFLIN-ST. JEOR: SCORE: 1655.84

## 2020-09-01 NOTE — PROGRESS NOTES
SUBJECTIVE:   CC: Shefali Dailey is an 65 year old woman who presents for preventive health visit.     Healthy Habits:    Do you get at least three servings of calcium containing foods daily (dairy, green leafy vegetables, etc.)? no, taking calcium and/or vitamin D supplement: no    Amount of exercise or daily activities, outside of work: none    Problems taking medications regularly No    Medication side effects: No    Have you had an eye exam in the past two years? yes    Do you see a dentist twice per year? no    Do you have sleep apnea, excessive snoring or daytime drowsiness?snoring           Today's PHQ-2 Score:   PHQ-2 ( 1999 [employer]) 9/1/2020 9/3/2019   Q1: Little interest or pleasure in doing things 0 0   Q2: Feeling down, depressed or hopeless 0 0   PHQ-2 Score 0 0       Abuse: Current or Past(Physical, Sexual or Emotional)- No  Do you feel safe in your environment? Yes        Social History     Tobacco Use     Smoking status: Current Every Day Smoker     Packs/day: 0.50     Years: 44.00     Pack years: 22.00     Types: Cigarettes     Start date: 1/1/1976     Smokeless tobacco: Never Used   Substance Use Topics     Alcohol use: Yes     Alcohol/week: 0.0 standard drinks     Comment: rarely     If you drink alcohol do you typically have >3 drinks per day or >7 drinks per week? No                     Reviewed orders with patient.  Reviewed health maintenance and updated orders accordingly - Yes  Lab work is in process       Reviewed and updated as needed this visit by clinical staff  Tobacco  Allergies  Meds         Reviewed and updated as needed this visit by Provider        Past Medical History:   Diagnosis Date     Malignant neoplasm of breast (female), unspecified 11/28/2005      Past Surgical History:   Procedure Laterality Date     APPENDECTOMY  2004     ARTHROPLASTY KNEE Left 9/12/2019    Procedure: LEFT TOTAL KNEE/NITHIN;  Surgeon: Mat Garner MD;  Location: [facility]     ARTHROSCOPY KNEE Left  "4/9/2015    Procedure: ARTHROSCOPY KNEE;  Surgeon: Arden Oreilly MD;  Location: [facility]     COLONOSCOPY N/A 04/25/2012    Normal. Repeat in 10 years.      DILATION AND CURETTAGE      D&C, multiple     DISSECT LYMPH NODE AXILLA Left 01/01/2005    Performed during bilateral mastectomy.      ESOPHAGOSCOPY, GASTROSCOPY, DUODENOSCOPY (EGD), COMBINED  2012     HYSTERECTOMY TOTAL ABDOMINAL, BILATERAL SALPINGO-OOPHORECTOMY, COMBINED Bilateral 08/26/2004    no cervix, [facility]      MAMMOPLASTY AUGMENTATION BILATERAL Bilateral 06/2006    Bilateral, expansion with saline implants     MASTECTOMY MODIFIED RADICAL, SENTINEL NODE, COMBINED Bilateral 01/01/2005    Bilateral, lymph nodes removed from left side     TONSILLECTOMY  1961      BREAST BIOPSY CORE NEEDLE LEFT Left 2005       ROS:  CONSTITUTIONAL: NEGATIVE for fever, chills, change in weight  INTEGUMENTARY/SKIN: NEGATIVE for worrisome rashes, moles or lesions  EYES: NEGATIVE for vision changes or irritation  ENT: NEGATIVE for ear, mouth and throat problems  RESP: NEGATIVE for significant cough or SOB  BREAST: NEGATIVE for masses, tenderness or discharge  CV: NEGATIVE for chest pain, palpitations or peripheral edema  GI: NEGATIVE for nausea, abdominal pain, heartburn, or change in bowel habits  : NEGATIVE for unusual urinary or vaginal symptoms. No vaginal bleeding.  MUSCULOSKELETAL: NEGATIVE for significant arthralgias or myalgia  NEURO: NEGATIVE for weakness, dizziness or paresthesias  PSYCHIATRIC: NEGATIVE for changes in mood or affect     OBJECTIVE:   /60   Pulse 77   Temp 98.7  F (37.1  C)   Ht 1.778 m (5' 10\")   Wt 102.1 kg (225 lb)   SpO2 97%   BMI 32.28 kg/m    EXAM:  GENERAL: healthy, alert and no distress  EYES: Eyes grossly normal to inspection, PERRL and conjunctivae and sclerae normal  HENT: ear canals and TM's normal, nose and mouth without ulcers or lesions  NECK: no adenopathy, no asymmetry, masses, or scars and thyroid " "normal to palpation  RESP: lungs clear to auscultation - no rales, rhonchi or wheezes  CV: regular rate and rhythm, normal S1 S2, no S3 or S4, no murmur, click or rub, no peripheral edema and peripheral pulses strong  ABDOMEN: soft, nontender, no hepatosplenomegaly, no masses and bowel sounds normal  MS: no gross musculoskeletal defects noted, no edema  SKIN: keratotic lesion left forearm near the lateral epicondyle.   NEURO: Normal strength and tone, mentation intact and speech normal  PSYCH: mentation appears normal, affect normal/bright    Diagnostic Test Results:  Labs reviewed in Epic    ASSESSMENT/PLAN:       ICD-10-CM    1. Screening for HIV (human immunodeficiency virus)  Z11.4    2. Lipid screening  Z13.220    3. Need for hepatitis C screening test  Z11.59    4. Tobacco abuse  Z72.0    5. Routine general medical examination at a health care facility  Z00.00    6. Chronic pain of left knee  M25.562     G89.29    7. Spinal stenosis, lumbar region, without neurogenic claudication  M48.061    8. Elevated glucose level  R73.09 Lipid Profile (Chol, Trig, HDL, LDL calc)     Glucose     Hemoglobin A1c   9. Skin lesion  L98.9 GENERAL SURG ADULT REFERRAL     Ask surgery to remove the lesion.  It has failed cryo.  Weight gain this year with knee surgery and back issues.  She is working on it.  6 month fasting labs and she is very motivated.  F/u routine.      COUNSELING:   Reviewed preventive health counseling, as reflected in patient instructions    Estimated body mass index is 32.28 kg/m  as calculated from the following:    Height as of this encounter: 1.778 m (5' 10\").    Weight as of this encounter: 102.1 kg (225 lb).        She reports that she has been smoking cigarettes. She started smoking about 44 years ago. She has a 22.00 pack-year smoking history. She has never used smokeless tobacco.  Tobacco Cessation Action Plan:         Counseling Resources:  ATP IV Guidelines  Pooled Cohorts Equation " Calculator  Breast Cancer Risk Calculator  BRCA-Related Cancer Risk Assessment: FHS-7 Tool  FRAX Risk Assessment  ICSI Preventive Guidelines  Dietary Guidelines for Americans, 2010  USDA's MyPlate  ASA Prophylaxis  Lung CA Screening    Yosef Gonzales MD  [facility]

## 2020-09-01 NOTE — NURSING NOTE
"Chief Complaint   Patient presents with     Physical       Initial /60   Pulse 77   Temp 98.7  F (37.1  C)   Ht 1.778 m (5' 10\")   Wt 102.1 kg (225 lb)   SpO2 97%   BMI 32.28 kg/m   Estimated body mass index is 32.28 kg/m  as calculated from the following:    Height as of this encounter: 1.778 m (5' 10\").    Weight as of this encounter: 102.1 kg (225 lb).  Medication Reconciliation: complete  Raeann Gandhi LPN  "

## 2020-09-02 ASSESSMENT — ANXIETY QUESTIONNAIRES: GAD7 TOTAL SCORE: 0

## 2020-09-18 ENCOUNTER — OFFICE VISIT (OUTPATIENT)
Dept: SURGERY | Facility: OTHER | Age: 65
End: 2020-09-18
Attending: FAMILY MEDICINE
Payer: COMMERCIAL

## 2020-09-18 VITALS
BODY MASS INDEX: 32.21 KG/M2 | HEIGHT: 70 IN | HEART RATE: 69 BPM | TEMPERATURE: 98.3 F | DIASTOLIC BLOOD PRESSURE: 64 MMHG | OXYGEN SATURATION: 96 % | SYSTOLIC BLOOD PRESSURE: 124 MMHG | WEIGHT: 225 LBS

## 2020-09-18 DIAGNOSIS — L98.9 SKIN LESION: ICD-10-CM

## 2020-09-18 PROCEDURE — 11403 EXC TR-EXT B9+MARG 2.1-3CM: CPT | Mod: 51 | Performed by: SURGERY

## 2020-09-18 PROCEDURE — 88305 TISSUE EXAM BY PATHOLOGIST: CPT | Mod: TC | Performed by: SURGERY

## 2020-09-18 PROCEDURE — 12031 INTMD RPR S/A/T/EXT 2.5 CM/<: CPT | Performed by: SURGERY

## 2020-09-18 ASSESSMENT — PAIN SCALES - GENERAL: PAINLEVEL: NO PAIN (0)

## 2020-09-18 ASSESSMENT — MIFFLIN-ST. JEOR: SCORE: 1655.84

## 2020-09-18 NOTE — PATIENT INSTRUCTIONS
Thank you for allowing Dr. Anderson and the surgical team to participate in your care today. If you have any questions or concerns, you can call Dr. Anderson office at [phone] or [phone] between the hours of 8AM and 4:30PM Monday through Friday.     POST PROCEDURE INSTRUCTIONS      Apply ice to the surgical area to reduce swelling. (no longer than 20 minutes at a time)    Remove your dressing in 48 hours    Keep incision clean and dry   Do NOT soak in water such as a tub bath or swimming   Do NOT put make-up, deodorant, powders, hairspray, lotions, etc on the incision    Cover with a clean dressing daily or when wet/soiled    If you have steri-strips, these will fall off on their own in 7 days. If they are still adhered after 7 days, you may remove them by pulling gently.     You can use acetaminophen(Tylenol) or the prescription you received for pain.     If you have any bleeding, cover the wound with clean gauze and hold pressure for 10 Minutes. If the bleeding does not stop or is heavy and profuse, call the clinic or go to the Urgent Care/Emergency Department.      SIGNS OF INFECTION ARE:    Redness, swelling, red streaks, pus, drainage, warmth, fever, increased pain, foul smell.     Contact your surgeon or primary health care provider if you notice any of the warning signs.     FOLLOW - UP  As needed

## 2020-09-18 NOTE — NURSING NOTE
"Skin lesion left forearm.  Has been there for several years-Dr Gonzales is referring    Initial /64 (BP Location: Right arm, Patient Position: Chair, Cuff Size: Adult Regular)   Pulse 69   Temp 98.3  F (36.8  C) (Tympanic)   Ht 1.778 m (5' 10\")   Wt 102.1 kg (225 lb)   SpO2 96%   BMI 32.28 kg/m   Estimated body mass index is 32.28 kg/m  as calculated from the following:    Height as of this encounter: 1.778 m (5' 10\").    Weight as of this encounter: 102.1 kg (225 lb).  Medication Reconciliation: complete  OLE GONSALES LPN    "

## 2020-09-21 NOTE — PROGRESS NOTES
[facility] Surgery  Minor Procedure Note    Preoperative diagnosis:  Non healing wound left arm     Postoperative diagnosis:  Same     Procedure:  Narrow excision left arm     Anesthesia:  Local    History: 65 year old year old female with history of left arm lesion for many years, attempts at freezing lesion have not yielded intended result is now here for outpatient excision.    Findings:  Narrow excision performed aprox 2.5 cm     Tissue to pathology:   See above      Details:  Pre procedure caitlin was placed and the requisite time out pause observed during which the patient confirmed their identity, date of birth, side and site of the requested excision.  The region was prepped and draped sterilely; local anesthesia was obtained with infiltration of 3 cc of 2% lidocaine.  An elliptical incision was made to narrowly excise skin lesion and carried through full thickness skin down to subcutaneous fat. It was then closed in layers with interrupted 3-0 Vicryl in the subcutaneous tissue.  The skin was reapproximated with 4-0 monocryl in a subcuticular fashion.  Steri Strips and sterile dressings were applied.   The patient was observed in the treatment room for 15 minutes following the procedure without sequelae.  The procedure was well tolerated and the patient was discharged with wound care instructions and followup appointment.       Guevara Anderson MD

## 2020-09-22 LAB — COPATH REPORT: NORMAL

## 2020-12-28 ENCOUNTER — MYC MEDICAL ADVICE (OUTPATIENT)
Dept: FAMILY MEDICINE | Facility: OTHER | Age: 65
End: 2020-12-28

## 2020-12-28 DIAGNOSIS — M48.061 SPINAL STENOSIS, LUMBAR REGION, WITHOUT NEUROGENIC CLAUDICATION: Primary | ICD-10-CM

## 2020-12-28 RX ORDER — CYCLOBENZAPRINE HCL 10 MG
10 TABLET ORAL 3 TIMES DAILY PRN
Qty: 30 TABLET | Refills: 0 | Status: SHIPPED | OUTPATIENT
Start: 2020-12-28 | End: 2022-05-03

## 2020-12-28 NOTE — TELEPHONE ENCOUNTER
flexerfill      Last Written Prescription Date:  2/24/20   Last Fill Quantity: 30,   # refills: 0  Last Office Visit: 9/1/20  Future Office visit:       Routing refill request to provider for review/approval because:

## 2021-01-28 ENCOUNTER — TRANSFERRED RECORDS (OUTPATIENT)
Dept: HEALTH INFORMATION MANAGEMENT | Facility: CLINIC | Age: 66
End: 2021-01-28

## 2021-02-23 DIAGNOSIS — R73.09 ELEVATED GLUCOSE LEVEL: ICD-10-CM

## 2021-02-23 LAB
CHOLEST SERPL-MCNC: 238 MG/DL
EST. AVERAGE GLUCOSE BLD GHB EST-MCNC: 128 MG/DL
GLUCOSE SERPL-MCNC: 128 MG/DL (ref 70–99)
HBA1C MFR BLD: 6.1 % (ref 0–5.6)
HDLC SERPL-MCNC: 42 MG/DL
LDLC SERPL CALC-MCNC: 130 MG/DL
NONHDLC SERPL-MCNC: 196 MG/DL
TRIGL SERPL-MCNC: 332 MG/DL

## 2021-02-23 PROCEDURE — 80061 LIPID PANEL: CPT | Performed by: FAMILY MEDICINE

## 2021-02-23 PROCEDURE — 83036 HEMOGLOBIN GLYCOSYLATED A1C: CPT | Performed by: FAMILY MEDICINE

## 2021-02-23 PROCEDURE — 999N001182 HC STATISTIC ESTIMATED AVERAGE GLUCOSE: Performed by: FAMILY MEDICINE

## 2021-02-23 PROCEDURE — 82947 ASSAY GLUCOSE BLOOD QUANT: CPT | Performed by: FAMILY MEDICINE

## 2021-02-23 PROCEDURE — 36415 COLL VENOUS BLD VENIPUNCTURE: CPT | Performed by: FAMILY MEDICINE

## 2021-02-26 NOTE — PROGRESS NOTES
"    Assessment & Plan     Prediabetes  Reviewed at length.  Going to try 6 months diet/ex and recheck.  Dm ed to help guide plan.    - DIABETES EDUCATION REFERRAL ([place])    Mixed hyperlipidemia  As above.    - DIABETES EDUCATION REFERRAL ([place])    Skin lesion  Discussed options.  Papular lesion top of head.  Seems like it might be a small BCC.  Consider some type of cyst.  She works in surgery.  I asked for office consult for consideration of excision.      Acute reaction to stress  Stable.  Continue indefinitely as it helps very clearly.    - GENERAL SURG ADULT REFERRAL  - venlafaxine (EFFEXOR-XR) 75 MG 24 hr capsule; Take 1 capsule (75 mg) by mouth daily             BMI:   Estimated body mass index is 33.91 kg/m  as calculated from the following:    Height as of this encounter: 1.765 m (5' 9.5\").    Weight as of this encounter: 105.7 kg (233 lb).           No follow-ups on file.    Yosef Gonzales MD  [facility] [place]    Antonieta Meehan is a 65 year old who presents for the following health issues     HPI       Lab follow up       Duration: 2/23/21    Description (location/character/radiation): elevated glucose/a1c    Intensity:  mild    Accompanying signs and symptoms: none    History (similar episodes/previous evaluation): None    Precipitating or alleviating factors: None    Therapies tried and outcome: None            Review of Systems   Constitutional, HEENT, cardiovascular, pulmonary, gi and gu systems are negative, except as otherwise noted.      Objective    /62   Pulse 83   Ht 1.765 m (5' 9.5\")   Wt 105.7 kg (233 lb)   SpO2 96%   BMI 33.91 kg/m    Body mass index is 33.91 kg/m .  Physical Exam   GENERAL: healthy, alert and no distress  NECK: no adenopathy, no asymmetry, masses, or scars and thyroid normal to palpation  RESP: lungs clear to auscultation - no rales, rhonchi or wheezes  CV: regular rate and rhythm, normal S1 S2, no S3 or S4, no murmur, click or rub, " no peripheral edema and peripheral pulses strong  ABDOMEN: soft, nontender, no hepatosplenomegaly, no masses and bowel sounds normal  MS: no gross musculoskeletal defects noted, no edema  SKIN: 3-4 mm papular lesion top of scalp.

## 2021-03-04 ENCOUNTER — OFFICE VISIT (OUTPATIENT)
Dept: FAMILY MEDICINE | Facility: OTHER | Age: 66
End: 2021-03-04
Attending: FAMILY MEDICINE
Payer: COMMERCIAL

## 2021-03-04 VITALS
HEART RATE: 83 BPM | DIASTOLIC BLOOD PRESSURE: 62 MMHG | OXYGEN SATURATION: 96 % | WEIGHT: 233 LBS | SYSTOLIC BLOOD PRESSURE: 130 MMHG | BODY MASS INDEX: 33.36 KG/M2 | HEIGHT: 70 IN

## 2021-03-04 DIAGNOSIS — R73.03 PREDIABETES: Primary | ICD-10-CM

## 2021-03-04 DIAGNOSIS — L98.9 SKIN LESION: ICD-10-CM

## 2021-03-04 DIAGNOSIS — F43.0 ACUTE REACTION TO STRESS: ICD-10-CM

## 2021-03-04 DIAGNOSIS — E78.2 MIXED HYPERLIPIDEMIA: ICD-10-CM

## 2021-03-04 PROCEDURE — 99214 OFFICE O/P EST MOD 30 MIN: CPT | Performed by: FAMILY MEDICINE

## 2021-03-04 RX ORDER — VENLAFAXINE HYDROCHLORIDE 75 MG/1
75 CAPSULE, EXTENDED RELEASE ORAL DAILY
Qty: 90 CAPSULE | Refills: 3 | Status: SHIPPED | OUTPATIENT
Start: 2021-03-04 | End: 2022-03-07

## 2021-03-04 ASSESSMENT — PAIN SCALES - GENERAL: PAINLEVEL: NO PAIN (0)

## 2021-03-04 ASSESSMENT — MIFFLIN-ST. JEOR: SCORE: 1684.19

## 2021-03-04 NOTE — NURSING NOTE
"Chief Complaint   Patient presents with     RECHECK       Initial /62   Pulse 83   Ht 1.765 m (5' 9.5\")   Wt 105.7 kg (233 lb)   SpO2 96%   BMI 33.91 kg/m   Estimated body mass index is 33.91 kg/m  as calculated from the following:    Height as of this encounter: 1.765 m (5' 9.5\").    Weight as of this encounter: 105.7 kg (233 lb).  Medication Reconciliation: complete  Raeann Gandhi LPN  "

## 2021-03-12 ENCOUNTER — OFFICE VISIT (OUTPATIENT)
Dept: SURGERY | Facility: OTHER | Age: 66
End: 2021-03-12
Attending: FAMILY MEDICINE
Payer: COMMERCIAL

## 2021-03-12 VITALS
WEIGHT: 230 LBS | OXYGEN SATURATION: 97 % | BODY MASS INDEX: 33.48 KG/M2 | HEART RATE: 80 BPM | TEMPERATURE: 96.5 F | DIASTOLIC BLOOD PRESSURE: 84 MMHG | SYSTOLIC BLOOD PRESSURE: 130 MMHG

## 2021-03-12 DIAGNOSIS — F43.0 ACUTE REACTION TO STRESS: ICD-10-CM

## 2021-03-12 DIAGNOSIS — L98.9 SKIN LESION: Primary | ICD-10-CM

## 2021-03-12 PROCEDURE — 88341 IMHCHEM/IMCYTCHM EA ADD ANTB: CPT | Mod: TC | Performed by: SURGERY

## 2021-03-12 PROCEDURE — 88312 SPECIAL STAINS GROUP 1: CPT | Mod: TC | Performed by: SURGERY

## 2021-03-12 PROCEDURE — 88305 TISSUE EXAM BY PATHOLOGIST: CPT | Mod: TC | Performed by: SURGERY

## 2021-03-12 PROCEDURE — 88342 IMHCHEM/IMCYTCHM 1ST ANTB: CPT | Mod: TC | Performed by: SURGERY

## 2021-03-12 PROCEDURE — 11104 PUNCH BX SKIN SINGLE LESION: CPT | Performed by: SURGERY

## 2021-03-12 ASSESSMENT — PAIN SCALES - GENERAL: PAINLEVEL: MILD PAIN (2)

## 2021-03-12 NOTE — NURSING NOTE
"Chief Complaint   Patient presents with     New Patient     Cyst on Scalp         Initial /84 (BP Location: Right arm, Patient Position: Sitting, Cuff Size: Adult Large)   Pulse 80   Temp 96.5  F (35.8  C) (Tympanic)   Wt 104.3 kg (230 lb)   SpO2 97%   BMI 33.48 kg/m   Estimated body mass index is 33.48 kg/m  as calculated from the following:    Height as of 3/4/21: 1.765 m (5' 9.5\").    Weight as of this encounter: 104.3 kg (230 lb).  Medication Reconciliation: complete  Keyanna Villavicencio LPN  "

## 2021-03-12 NOTE — PATIENT INSTRUCTIONS
"Thank you for allowing Dr. Anderson and the surgical team to participate in your care today. Please call with any scheduling questions to our Unit Health Coordinator at [phone] or any other questions to the nurse (Scarlett) at [phone].     POST PROCEDURE INSTRUCTIONS    Apply ice to the surgical area to reduce swelling if desired. (no longer than 20 minutes at a time)  Remove your dressing in 24-48 hours.  Wash incision gently with soap and water twice daily or let soapy shower water run over the wound. Do not scrub the wound.  Keep incision clean and dry.   Do not submerge wound in water such as a tub bath or swimming.   Do not do dishes or \"dirty work\" if wound is on hands.   Do not put make-up, deodorant, powders, hairspray, lotions, etc on the incision.  You may apply antibiotic ointment twice daily.  Cover with a clean dressing daily or when wet/soiled  If you have steri-strips, these will fall off on their own in 7 days. If they are still adhered after 7 days, you may remove them by pulling gently.   You can use acetaminophen(Tylenol) and Ibuprofen for pain.     If you have any bleeding, cover the wound with clean gauze and hold pressure for 10-15 Minutes. If the bleeding does not stop or is heavy and profuse, call the clinic or go to the Urgent Care/Emergency Department.    SIGNS OF INFECTION:    Watch for redness, swelling, red streaks, pus, drainage, warmth, fever, increased pain, foul smell.   Contact our office or your primary health care provider if you notice any of the warning signs.     FOLLOW - UP    Follow-up in clinic with  in 7-10 days for suture removal.   Pathology results will available in about 7-10 days and be communicated via phone or letter or may be discussed at a follow-up appointment.  Call the office with any questions.     "

## 2021-03-18 NOTE — PROGRESS NOTES
[facility] Surgery  Minor Procedure Note    Preoperative diagnosis:  Scalp lesion     Postoperative diagnosis:  Same     Procedure:  Excisional biopsy scalp     Anesthesia:  Local    History: 65 year old year old female who I am seeing at request of PCP for scalp lesion. It is quite small though has been present for several months. She does have history of sun exposure no history of cancer.      Findings:  5mm full thickness scalp excision    Tissue to pathology:    See above     Details:   The requisite time out pause observed during which the patient confirmed their identity, date of birth, side and site of the requested excision.  The region was prepped and draped sterilely; local anesthesia was obtained with infiltration of 2 cc of 2% lidocaine, with epi.  Then taking a 5 mm punch biopsy the lesion was grossly removed. The incision was closed with 3-0 nylon x 2.  The procedure was well tolerated and the patient was discharged with wound care instructions and followup appointment.       Guevara Anderson MD

## 2021-03-22 LAB — COPATH REPORT: NORMAL

## 2021-07-12 ENCOUNTER — OFFICE VISIT (OUTPATIENT)
Dept: PODIATRY | Facility: OTHER | Age: 66
End: 2021-07-12
Attending: PODIATRIST
Payer: COMMERCIAL

## 2021-07-12 VITALS
DIASTOLIC BLOOD PRESSURE: 76 MMHG | SYSTOLIC BLOOD PRESSURE: 133 MMHG | RESPIRATION RATE: 16 BRPM | HEART RATE: 78 BPM | OXYGEN SATURATION: 94 % | TEMPERATURE: 99.7 F

## 2021-07-12 DIAGNOSIS — M62.462 GASTROCNEMIUS EQUINUS OF LEFT LOWER EXTREMITY: ICD-10-CM

## 2021-07-12 DIAGNOSIS — G57.92 NEURITIS OF LEFT HEEL: Primary | ICD-10-CM

## 2021-07-12 DIAGNOSIS — M79.672 LEFT FOOT PAIN: ICD-10-CM

## 2021-07-12 DIAGNOSIS — M20.12 HALLUX VALGUS (ACQUIRED), LEFT FOOT: ICD-10-CM

## 2021-07-12 DIAGNOSIS — L84 CALLUS OF FOOT: ICD-10-CM

## 2021-07-12 PROCEDURE — 99203 OFFICE O/P NEW LOW 30 MIN: CPT | Mod: 25 | Performed by: PODIATRIST

## 2021-07-12 PROCEDURE — 20550 NJX 1 TENDON SHEATH/LIGAMENT: CPT | Performed by: PODIATRIST

## 2021-07-12 RX ORDER — TRIAMCINOLONE ACETONIDE 40 MG/ML
40 INJECTION, SUSPENSION INTRA-ARTICULAR; INTRAMUSCULAR ONCE
Status: COMPLETED | OUTPATIENT
Start: 2021-07-12 | End: 2021-07-12

## 2021-07-12 RX ORDER — DEXAMETHASONE SODIUM PHOSPHATE 4 MG/ML
4 INJECTION, SOLUTION INTRA-ARTICULAR; INTRALESIONAL; INTRAMUSCULAR; INTRAVENOUS; SOFT TISSUE ONCE
Status: COMPLETED | OUTPATIENT
Start: 2021-07-12 | End: 2021-07-12

## 2021-07-12 RX ADMIN — TRIAMCINOLONE ACETONIDE 40 MG: 40 INJECTION, SUSPENSION INTRA-ARTICULAR; INTRAMUSCULAR at 16:19

## 2021-07-12 RX ADMIN — DEXAMETHASONE SODIUM PHOSPHATE 4 MG: 4 INJECTION, SOLUTION INTRA-ARTICULAR; INTRALESIONAL; INTRAMUSCULAR; INTRAVENOUS; SOFT TISSUE at 16:19

## 2021-07-12 NOTE — PATIENT INSTRUCTIONS
-Stability Shoe Gear: This involves wearing a solid tennis shoe that bends at the toe, but has a solid midfoot portion of the shoe that does not bend or twist in half, and a rigid heel contour.   -----Cazares, Asics, and New Balance are a few brands that have several types of stability tennis shoes. However, these brands also carry lightweight shoes that do not meet the above criteria, so look for a stability tennis shoe.  -----Cazares tend to have a wider toe box if you have difficulty finding wide enough shoes for your feet.   -----Any brand of can be worn as long as it meets the above three criteria.  -Compression socks: Advised to wear compression socks all day (especially when working) to decrease LOWER EXTREMITY swelling in both feet and legs. Apply the socks first thing in the morning before getting out of bed and remove them at night when the feet are elevated in bed.  -Stretching: Stretch the calf muscles to increase flexibility of the calf muscles. If possible, aim to stretch the calf muscles for a combined total of one hour per day.  -Icing: Ice the painful area of the foot minimally once a day for ten minutes per foot (can be a frozen water bottle if pain is on the bottom surface of the foot). Ice after any extended amount of time on your feet.  -Consider supportive sandals for around the house (such as Henry, Vionic, or Oofos sandals)    -Physical therapy will be considered if pain does not improve.    -Orthotics: Consider the orthotist for [facility] at [facility]. Let him know about the issue with your shoes needing to be wide for the bunion but then it is too wide for the heel. Inform him that your pain is on the lateral heel and is suspected to be caused from a nerve impingement near the lateral calcaneus along with a plantar calcaneal bursitis. You may do well with a deeper heel cup and/or a gel cup and/or a mild varus heel tilt.        Thank you for allowing  and our Podiatry team to  participate in your care. Please call our office at [phone] with scheduling questions or with any other questions or concerns.

## 2021-07-12 NOTE — NURSING NOTE
"Chief Complaint   Patient presents with     Pain       Initial /76 (BP Location: Right arm, Patient Position: Sitting, Cuff Size: Adult Regular)   Pulse 78   Temp 99.7  F (37.6  C) (Tympanic)   Resp 16   SpO2 94%  Estimated body mass index is 33.48 kg/m  as calculated from the following:    Height as of 3/4/21: 1.765 m (5' 9.5\").    Weight as of 3/12/21: 104.3 kg (230 lb).  Medication Reconciliation: complete  Morena Fontana LPN  "

## 2021-07-12 NOTE — PROGRESS NOTES
Clinic Administered Medication Documentation    Administrations This Visit     dexamethasone (DECADRON) injection 4 mg     Admin Date  07/12/2021 Action  Given Dose  4 mg Route  INTRA-ARTICULAR Site   Administered By  Morena Fontana LPN    Ordering Provider: Kelsey Hughes DPM    Patient Supplied?: No          triamcinolone (KENALOG-40) injection 40 mg     Admin Date  07/12/2021 Action  Given Dose  40 mg Route  INTRA-ARTICULAR Site   Administered By  Morena Fontana LPN    Ordering Provider: Kelsey Hughes DPM    Patient Supplied?: No                Left lateral heel

## 2021-07-12 NOTE — PROGRESS NOTES
Chief complaint: Patient presents with:  Pain      History of Present Illness: This 66 year old female is seen at the request of No ref. provider found for evaluation and suggestions of management of LEFT heel pain.    Patient has LEFT lateral heel pain that has been ongoing for the past month. The pain has not improved and has particularly peaked today on 07/12/2021. She did not walk or move a lot this past weekend because she was on-call, so she is wondering if this has contributed to her pain being worse today. 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 She is not aware of any trauma to the foot that started the pain. Pain is primarily along the lateral heel and the plantar central heel.    Patient has been trying to avoid barefoot walking and she purchased a cushioned sandal that she wears around the house. This does not eliminate the pain but does seem to improve it. At work she either wears Lorena shoes and inserts (slip on clog-style type of shoe) or a Croc style of shoe. The Croc style of shoe seems to feel better than the firm shoes.    Additionally, patient has a LEFT foot bunion. The bunion does not hurt a lot but does sometimes cause pain when rubbing in shoe gear. Because of the bunion, she has to wear wider shoes, but then her heel slips around in her shoes. She is wondering if the modified shoe size and/or if her LEFT knee replacement from 2019 contributed to her foot pain. Her bunion does occasionally flare up and cause her pain.    No further pedal complaints today.         /76 (BP Location: Right arm, Patient Position: Sitting, Cuff Size: Adult Regular)   Pulse 78   Temp 99.7  F (37.6  C) (Tympanic)   Resp 16   SpO2 94%     Patient Active Problem List   Diagnosis     ACP (advance care planning)     Immune to hepatitis B     Left knee pain     Status post total left knee replacement     Spinal stenosis, lumbar region, without neurogenic  claudication       Past Surgical History:   Procedure Laterality Date     APPENDECTOMY  2004     ARTHROPLASTY KNEE Left 09/12/2019    Procedure: LEFT TOTAL KNEE/NITHIN;  Surgeon: Mat Garner MD;  Location: [facility]     ARTHROSCOPY KNEE Left 04/09/2015    Procedure: ARTHROSCOPY KNEE;  Surgeon: Arden Oreilly MD;  Location: [facility]     AS TOTAL KNEE ARTHROPLASTY Left 09/01/2019     COLONOSCOPY N/A 04/25/2012    Normal. Repeat in 10 years.      DILATION AND CURETTAGE      D&C, multiple     DISSECT LYMPH NODE AXILLA Left 01/01/2005    Performed during bilateral mastectomy.      ESOPHAGOSCOPY, GASTROSCOPY, DUODENOSCOPY (EGD), COMBINED  2012     HYSTERECTOMY TOTAL ABDOMINAL, BILATERAL SALPINGO-OOPHORECTOMY, COMBINED Bilateral 08/26/2004    no cervix, [facility]      MAMMOPLASTY AUGMENTATION BILATERAL Bilateral 06/2006    Bilateral, expansion with saline implants     MASTECTOMY MODIFIED RADICAL, SENTINEL NODE, COMBINED Bilateral 01/01/2005    Bilateral, lymph nodes removed from left side     TONSILLECTOMY  1961      BREAST BIOPSY CORE NEEDLE LEFT Left 2005       Current Outpatient Medications   Medication     cyclobenzaprine (FLEXERIL) 10 MG tablet     venlafaxine (EFFEXOR-XR) 75 MG 24 hr capsule     No current facility-administered medications for this visit.          Allergies   Allergen Reactions     Benoxinate Hcl Other (See Comments), Itching and Swelling     Burning  Fluress       Fluorescein Sodium Other (See Comments), Itching and Swelling     Burning  Fluress     Fluorescein-Benoxinate Other (See Comments)       Family History   Problem Relation Age of Onset     Diabetes Mother      Myocardial Infarction Mother 72        myocardial infarction, cause of death     Other - See Comments Father 63        liver cirrhosis     Cerebrovascular Disease Father 64        cva, cause of death     Breast Cancer Daughter      Other - See Comments Sister         cholelithiasis     Other - See Comments Sister       Diabetes Sister         IDDM     Cancer - colorectal Sister         59     Diabetes Sister         diet controlled     Other - See Comments Sister         yamileth cirrhosis, stage 4       Social History     Socioeconomic History     Marital status:      Spouse name: None     Number of children: None     Years of education: None     Highest education level: None   Occupational History     None   Tobacco Use     Smoking status: Current Every Day Smoker     Packs/day: 0.50     Years: 44.00     Pack years: 22.00     Types: Cigarettes     Start date: 1/1/1976     Smokeless tobacco: Never Used     Tobacco comment: Pt denied QP 7/12/2021   Substance and Sexual Activity     Alcohol use: Yes     Alcohol/week: 0.0 standard drinks     Comment: rarely     Drug use: No     Sexual activity: None   Other Topics Concern      Service Not Asked     Blood Transfusions Yes     Caffeine Concern Yes     Comment: coffee 6 cups daily     Occupational Exposure Not Asked     Hobby Hazards Not Asked     Sleep Concern Not Asked     Stress Concern Not Asked     Weight Concern Not Asked     Special Diet Not Asked     Back Care Not Asked     Exercise Not Asked     Bike Helmet Not Asked     Seat Belt Not Asked     Self-Exams Not Asked     Parent/sibling w/ CABG, MI or angioplasty before 65F 55M? No   Social History Narrative     None     Social Determinants of Health     Financial Resource Strain:      Difficulty of Paying Living Expenses:    Food Insecurity:      Worried About Running Out of Food in the Last Year:      Ran Out of Food in the Last Year:    Transportation Needs:      Lack of Transportation (Medical):      Lack of Transportation (Non-Medical):    Physical Activity:      Days of Exercise per Week:      Minutes of Exercise per Session:    Stress:      Feeling of Stress :    Social Connections:      Frequency of Communication with Friends and Family:      Frequency of Social Gatherings with Friends and Family:       Attends Orthodox Services:      Active Member of Clubs or Organizations:      Attends Club or Organization Meetings:      Marital Status:    Intimate Partner Violence:      Fear of Current or Ex-Partner:      Emotionally Abused:      Physically Abused:      Sexually Abused:        ROS: 10 point ROS neg other than the symptoms noted above in the HPI.  EXAM  Constitutional: healthy, alert and no distress    Psychiatric: mentation appears normal and affect normal/bright    LEFT FOOT FOCUSED    VASCULAR:  -Dorsalis pedis pulse +2/4   -Posterior tibial pulse +2/4   -Capillary refill time < 3 seconds to b/l hallux  NEURO:  -Light touch sensation intact to b/l plantar forefoot  DERM:  -Skin temperature, texture and turgor WNL b/l  -Hyperkeratotic lesion to the LEFT medial first metatarsal head  MSK:  -Lateral deviation of hallux with medial deviation of 1st metatarsal, LEFT   -Prominent bony prominence to dorsal and medial 1st metatarsal head, LEFT     -Pain on palpation to LEFT lateral central calcaneus along the plantar lateral rim  -Pain on palpation to plantar central heel    -Muscle strength of ankles +5/5 for dorsiflexion, plantarflexion, ABDUction and ADDuction b/l    -Ankle joint passive ROM within normal limits except for dorsiflexion:    Dorsiflexion, LEFT Straight knee 0 to 5 degrees    ============================================================    ASSESSMENT:  (G57.92) Neuritis of left heel  (primary encounter diagnosis)    (M79.672) Left foot pain    (L84) Callus of foot    (M20.12) Hallux valgus (acquired), left foot    (M21.6X2) Gastrocnemius equinus of left lower extremity        PLAN:  -Patient evaluated and examined. Treatment options discussed with no educational barriers noted.  -Discussed lateral heel pain pain including potential etiologies and treatment options. Patient's pain was likely started due to a combination of factors that can include but are not limited to worn or improper shoe gear,  sudden change in shoe gear, change in activity, imbalanced biomechanics (such as the patient's bilateral equinus deformity of the calf muscles), recent h/o a knee replacement and improper shoe fitting on the heel to accommodate for bunion pain. The above combinations can contribute to compression and pain of the nerve on the lateral heel.  ---Discussed conservative treatment options including compression socks, icing, elevating, resting, injections, PT, change in shoe gear (including proper shoe gear around the house), night splints. At this time, patient would like to proceed with the below treatment options:      -Stability Shoe Gear: This involves wearing a solid tennis shoe that bends at the toe, but has a solid midfoot portion of the shoe that does not bend or twist in half, and a rigid heel contour.   -----Cazares, Asics, and New Balance are a few brands that have several types of stability tennis shoes. However, these brands also carry lightweight shoes that do not meet the above criteria, so look for a stability tennis shoe.  -----Cazares tend to have a wider toe box if you have difficulty finding wide enough shoes for your feet.   -----Any brand of can be worn as long as it meets the above three criteria.  -Compression socks: Advised to wear compression socks all day (especially when working) to decrease LOWER EXTREMITY swelling in both feet and legs. Apply the socks first thing in the morning before getting out of bed and remove them at night when the feet are elevated in bed.  -Stretching: Stretch the calf muscles to increase flexibility of the calf muscles. If possible, aim to stretch the calf muscles for a combined total of one hour per day.  -Icing: Ice the painful area of the foot minimally once a day for ten minutes per foot (can be a frozen water bottle if pain is on the bottom surface of the foot). Ice after any extended amount of time on your feet.  -Consider supportive sandals for around the house  (such as Henry, [place], or Aysha birmingham)    -Physical therapy will be considered if pain does not improve. Patient has declined physical therapy today but she will call if her pain worsens.    -Orthotics: Consider the orthotist for Jaya Asencio at [facility]. Patient is to let the orthotist know about the issue with her shoes needing to be wide for the bunion but then it is too wide for the heel. She is also to inform him that her pain is on the lateral heel and is suspected to be caused from a nerve impingement near the lateral calcaneus along with a plantar calcaneal bursitis. She may do well with a deeper heel cup and/or a gel cup and/or a mild varus heel tilt. She may call to schedule this appointment if needed.  ---Patient would have to wait 4-6 weeks to be seen for CMOs by another orthotist.    -Injection x 1 to the LEFT lateral heel: Obtained written and verbal consent from patient for a steroid injection into the lateral heel of the Left foot. Reviewed risks and benefits of the injection. Informed patient that the injection may decrease pain long-term, short-term, or not at all. Patient in agreement with an injection today in an effort to slow or reverse the chronic inflammatory process and pain in the foot.   ---Patient signed informed consent and a time-out was performed and there was verification of correct patient, procedure and laterality.  ---Prepped the injection site with an alcohol swab.  ---Injected a total of 3 mL of 1:1:1 of 4mg Dexamethasone, 40mg Kenalog, and 1mL of 2% Lidocaine plain. Patient tolerated the injection well.    ------------------------------------------------------------    Hallux valgus / bunions:   -Discussed causes and treatment options for bunion deformities:  ---Conservative treatment options consist of wider shoe gear and orthotics +/- padding/splinting to accommodate the bunion. A crest pad can hold down a dorsiflexed second digit, a toe spacer can help separate the hallux  and second digit, and silicone bunion sleeve can help pad the bunion and prevent painful rubbing in shoe gear. This will not correct the bunion deformity, but may help decrease pain.  ---Correcting the biomechanics of the foot may also decrease the progression of a bunion. An orthotic may be considered in attempt to treat the biomechanics of the foot (Patient has gastrocnemius equinus which can contribute to the progression of a bunion).   ---Discussed surgical treatment options including risks and benefits and post-op periods. Surgery should not be considered until the patient is experiencing daily pain that is limiting daily activities because of the bunion.     -At this time, the patient has considered risks and benefits to conservative and surgical options and would like to proceed with conservative treatment options.    ------------------------------------------------------    Painful calluses:    -Educated patient about routine callus care. After a shower or foot soak, the patient should apply a moisturizing cream to the callus and let it soak in for about ten minutes, then take an rubio board or nail file to the callus. This should be done consistently and frequently (minimally lotion and callus paring) to keep the callus well pared.    Total time spent preparing to see the patient, review of chart, obtaining history and physical examination, review of treatment options, education, discussion with patient and documenting in Epic / EMR was 35 minutes.    -Patient in agreement with the above treatment plan and all of patient's questions were answered.      RTC as needed if heel pain does not improve        Kelsey Hughes DPM

## 2021-09-05 ENCOUNTER — HOSPITAL ENCOUNTER (EMERGENCY)
Facility: HOSPITAL | Age: 66
Discharge: HOME | End: 2021-09-05
Attending: NURSE PRACTITIONER | Admitting: NURSE PRACTITIONER
Payer: COMMERCIAL

## 2021-09-05 VITALS
HEART RATE: 62 BPM | OXYGEN SATURATION: 97 % | SYSTOLIC BLOOD PRESSURE: 181 MMHG | DIASTOLIC BLOOD PRESSURE: 93 MMHG | RESPIRATION RATE: 16 BRPM | TEMPERATURE: 97.7 F

## 2021-09-05 DIAGNOSIS — Z20.822 EXPOSURE TO 2019 NOVEL CORONAVIRUS: ICD-10-CM

## 2021-09-05 LAB — SARS-COV-2 RNA RESP QL NAA+PROBE: NEGATIVE

## 2021-09-05 PROCEDURE — G0463 HOSPITAL OUTPT CLINIC VISIT: HCPCS

## 2021-09-05 PROCEDURE — C9803 HOPD COVID-19 SPEC COLLECT: HCPCS

## 2021-09-05 PROCEDURE — U0005 INFEC AGEN DETEC AMPLI PROBE: HCPCS | Performed by: NURSE PRACTITIONER

## 2021-09-05 PROCEDURE — 99213 OFFICE O/P EST LOW 20 MIN: CPT | Performed by: NURSE PRACTITIONER

## 2021-09-05 ASSESSMENT — ENCOUNTER SYMPTOMS
CHILLS: 0
SINUS PAIN: 0
HEADACHES: 1
NECK PAIN: 0
NAUSEA: 0
VOMITING: 0
SINUS PRESSURE: 0
FEVER: 0
APPETITE CHANGE: 0
COUGH: 0
SORE THROAT: 0
RHINORRHEA: 0
MYALGIAS: 0
ACTIVITY CHANGE: 1
SHORTNESS OF BREATH: 1
DIARRHEA: 0

## 2021-09-05 NOTE — ED TRIAGE NOTES
"Pt's  and son who lives with her has covid and pt wants a test. Pt c/o headache for 2 days and \"very slight shortness of breath\".  "

## 2021-09-05 NOTE — DISCHARGE INSTRUCTIONS
Covid vitamin daily protocol:    Zinc 50 mg  Vitamin C 1000 mg  Vitamin D 5000 international unit(s)

## 2021-09-05 NOTE — ED TRIAGE NOTES
Pt Presents with requesting a covid test due to her  and son being positive with their test results today.   - - -

## 2021-09-05 NOTE — Clinical Note
Shefali Dailey was seen and treated in our emergency department on 9/5/2021.  She may return to work on 09/05/2021.  Evaluated in Urgent Care. May return to work as noted per Employee health if she is negative and does not have symptoms  Has had Covid exposure ( tested positive) for past six days. Her symptoms started two days ago. Has had both vaccines     If you have any questions or concerns, please don't hesitate to call.      Emilee Holliday, CNP

## 2021-09-05 NOTE — ED PROVIDER NOTES
History     Chief Complaint   Patient presents with     Covid Concern     HPI  Shefali Dailey is a 66 year old female who was exposed to Covid by her  and son 2 days ago.  Now presents with a headache and a little bit of shortness of breath.  No OTC medications have been taken.  Has had Covid vaccination.  Smoker. Denies fevers, chills, nausea, vomiting, diarrhea, and shortness of breath.    Allergies:  Allergies   Allergen Reactions     Benoxinate Hcl Other (See Comments), Itching and Swelling     Burning  Fluress       Fluorescein Sodium Other (See Comments), Itching and Swelling     Burning  Fluress     Fluorescein-Benoxinate Other (See Comments)       Problem List:    Patient Active Problem List    Diagnosis Date Noted     Spinal stenosis, lumbar region, without neurogenic claudication 09/01/2020     Priority: Medium     Status post total left knee replacement 09/13/2019     Priority: Medium     Left knee pain 09/12/2019     Priority: Medium     Immune to hepatitis B 03/13/2019     Priority: Medium     ACP (advance care planning) 02/06/2013     Priority: Medium     Advance Care Planning 5/3/2017: ACP Review of Chart / Resources Provided:  Reviewed chart for advance care plan.  Shefali Dailey has been provided information and resources to begin or update their advance care plan.  Added by Shania Cobos                Past Medical History:    Past Medical History:   Diagnosis Date     Malignant neoplasm of breast (female), unspecified 11/28/2005       Past Surgical History:    Past Surgical History:   Procedure Laterality Date     APPENDECTOMY  2004     ARTHROPLASTY KNEE Left 09/12/2019    Procedure: LEFT TOTAL KNEE/NITHIN;  Surgeon: Mat Garner MD;  Location: [facility]     ARTHROSCOPY KNEE Left 04/09/2015    Procedure: ARTHROSCOPY KNEE;  Surgeon: Arden Oreilly MD;  Location: [facility]     AS TOTAL KNEE ARTHROPLASTY Left 09/01/2019     COLONOSCOPY N/A 04/25/2012    Normal. Repeat in 10 years.       DILATION AND CURETTAGE      D&C, multiple     DISSECT LYMPH NODE AXILLA Left 01/01/2005    Performed during bilateral mastectomy.      ESOPHAGOSCOPY, GASTROSCOPY, DUODENOSCOPY (EGD), COMBINED  2012     HYSTERECTOMY TOTAL ABDOMINAL, BILATERAL SALPINGO-OOPHORECTOMY, COMBINED Bilateral 08/26/2004    no cervix, [facility]      MAMMOPLASTY AUGMENTATION BILATERAL Bilateral 06/2006    Bilateral, expansion with saline implants     MASTECTOMY MODIFIED RADICAL, SENTINEL NODE, COMBINED Bilateral 01/01/2005    Bilateral, lymph nodes removed from left side     TONSILLECTOMY  1961      BREAST BIOPSY CORE NEEDLE LEFT Left 2005       Family History:    Family History   Problem Relation Age of Onset     Diabetes Mother      Myocardial Infarction Mother 72        myocardial infarction, cause of death     Other - See Comments Father 63        liver cirrhosis     Cerebrovascular Disease Father 64        cva, cause of death     Breast Cancer Daughter      Other - See Comments Sister         cholelithiasis     Other - See Comments Sister      Diabetes Sister         IDDM     Cancer - colorectal Sister         59     Diabetes Sister         diet controlled     Other - See Comments Sister         billary cirrhosis, stage 4       Social History:  Marital Status:   [4]  Social History     Tobacco Use     Smoking status: Current Every Day Smoker     Packs/day: 0.50     Years: 44.00     Pack years: 22.00     Types: Cigarettes     Start date: 1/1/1976     Smokeless tobacco: Never Used     Tobacco comment: Pt denied QP 7/12/2021   Substance Use Topics     Alcohol use: Yes     Alcohol/week: 0.0 standard drinks     Comment: rarely     Drug use: No        Medications:    cyclobenzaprine (FLEXERIL) 10 MG tablet  venlafaxine (EFFEXOR-XR) 75 MG 24 hr capsule          Review of Systems   Constitutional: Positive for activity change. Negative for appetite change, chills and fever.   HENT: Negative for ear pain, rhinorrhea, sinus  pressure, sinus pain and sore throat.    Respiratory: Positive for shortness of breath (little bit). Negative for cough.    Gastrointestinal: Negative for diarrhea, nausea and vomiting.   Genitourinary: Negative.    Musculoskeletal: Negative for myalgias and neck pain.   Skin: Negative.    Neurological: Positive for headaches.       Physical Exam   BP: (!) 181/93  Pulse: 62  Temp: 97.7  F (36.5  C)  Resp: 16  SpO2: 97 %      Physical Exam  Vitals and nursing note reviewed.   Constitutional:       General: She is not in acute distress.  HENT:      Head: Normocephalic.      Right Ear: Tympanic membrane and ear canal normal.      Left Ear: Tympanic membrane and ear canal normal.      Nose: Nose normal.      Right Sinus: No maxillary sinus tenderness or frontal sinus tenderness.      Left Sinus: No maxillary sinus tenderness or frontal sinus tenderness.      Mouth/Throat:      Lips: Pink.      Mouth: Mucous membranes are moist.      Pharynx: Uvula midline. No posterior oropharyngeal erythema.   Eyes:      Conjunctiva/sclera: Conjunctivae normal.   Cardiovascular:      Rate and Rhythm: Normal rate and regular rhythm.      Heart sounds: Normal heart sounds. No murmur heard.     Pulmonary:      Effort: Pulmonary effort is normal. No respiratory distress.      Breath sounds: Normal breath sounds. No wheezing.   Lymphadenopathy:      Cervical: No cervical adenopathy.   Skin:     General: Skin is warm and dry.   Neurological:      Mental Status: She is alert and oriented to person, place, and time.   Psychiatric:         Behavior: Behavior normal.         ED Course        Procedures             Results for orders placed or performed during the hospital encounter of 09/05/21 (from the past 24 hour(s))   Symptomatic COVID-19 Virus (Coronavirus) by PCR Nasopharyngeal    Specimen: Nasopharyngeal; Swab   Result Value Ref Range    SARS CoV2 PCR Negative Negative    Narrative    Testing was performed using the Xpert Xpress  SARS-CoV-2 Assay on the   [employer]. Additional information about   this Emergency Use Authorization (EUA) assay can be found via the Lab   Guide. This test should be ordered for the detection of SARS-CoV-2 in   individuals who meet SARS-CoV-2 clinical and/or epidemiological   criteria. Test performance is unknown in asymptomatic patients. This   test is for in vitro diagnostic use under the FDA EUA for   laboratories certified under CLIA to perform high complexity testing.   This test has not been FDA cleared or approved. A negative result   does not rule out the presence of PCR inhibitors in the specimen or   target RNA in concentration below the limit of detection for the   assay. The possibility of a false negative should be considered if   the patient's recent exposure or clinical presentation suggests   COVID-19. This test was validated by [facility]. This laboratory is certified under the Clinical Laboratory Improve  ment Amendments (CLIA) as qualified to perform high complexity testing.       Medications - No data to display    Assessments & Plan (with Medical Decision Making)     I have reviewed the nursing notes.    I have reviewed the findings, diagnosis, plan and need for follow up with the patient.  (Z20.822) Exposure to 2019 novel coronavirus  Comment: 66 year old female who was exposed to Covid by her  and son 2 days ago.  Now presents with a headache and a little bit of shortness of breath.  No OTC medications have been taken.  Has had Covid vaccination.  Smoker. Denies fevers, chills, nausea, vomiting, diarrhea, and shortness of breath.    MDM:NHT. Lungs CTA  Covid test negative    Plan: Covid vitamin daily protocol:    Zinc 50 mg  Vitamin C 1000 mg  Vitamin D 5000 international unit(s)     These discharge instructions and medications were reviewed with her and understanding verbalized.    This document was prepared using a  combination of typing and voice generated software.  While every attempt was made for accuracy, spelling and grammatical errors may exist.    Discharge Medication List as of 9/5/2021 12:09 PM          Final diagnoses:   Exposure to 2019 novel coronavirus       9/5/2021   [facility]       Emilee Holliday, CNP  09/09/21 [id number]

## 2021-10-03 ENCOUNTER — HEALTH MAINTENANCE LETTER (OUTPATIENT)
Age: 66
End: 2021-10-03

## 2022-03-07 ENCOUNTER — MYC MEDICAL ADVICE (OUTPATIENT)
Dept: FAMILY MEDICINE | Facility: OTHER | Age: 67
End: 2022-03-07
Payer: COMMERCIAL

## 2022-03-07 DIAGNOSIS — F43.0 ACUTE REACTION TO STRESS: ICD-10-CM

## 2022-03-07 RX ORDER — VENLAFAXINE HYDROCHLORIDE 75 MG/1
75 CAPSULE, EXTENDED RELEASE ORAL DAILY
Qty: 90 CAPSULE | Refills: 0 | Status: SHIPPED | OUTPATIENT
Start: 2022-03-07 | End: 2022-05-03

## 2022-04-21 ENCOUNTER — OFFICE VISIT (OUTPATIENT)
Dept: CHIROPRACTIC MEDICINE | Facility: OTHER | Age: 67
End: 2022-04-21
Attending: CHIROPRACTOR
Payer: COMMERCIAL

## 2022-04-21 DIAGNOSIS — M99.03 SEGMENTAL AND SOMATIC DYSFUNCTION OF LUMBAR REGION: Primary | ICD-10-CM

## 2022-04-21 DIAGNOSIS — M99.02 SEGMENTAL AND SOMATIC DYSFUNCTION OF THORACIC REGION: ICD-10-CM

## 2022-04-21 DIAGNOSIS — M54.50 ACUTE BILATERAL LOW BACK PAIN WITHOUT SCIATICA: ICD-10-CM

## 2022-04-21 PROCEDURE — 98940 CHIROPRACT MANJ 1-2 REGIONS: CPT | Mod: AT | Performed by: CHIROPRACTOR

## 2022-04-21 PROCEDURE — 99212 OFFICE O/P EST SF 10 MIN: CPT | Mod: 25 | Performed by: CHIROPRACTOR

## 2022-04-25 ENCOUNTER — OFFICE VISIT (OUTPATIENT)
Dept: CHIROPRACTIC MEDICINE | Facility: OTHER | Age: 67
End: 2022-04-25
Attending: CHIROPRACTOR
Payer: COMMERCIAL

## 2022-04-25 DIAGNOSIS — M99.02 SEGMENTAL AND SOMATIC DYSFUNCTION OF THORACIC REGION: Primary | ICD-10-CM

## 2022-04-25 DIAGNOSIS — M99.01 SEGMENTAL AND SOMATIC DYSFUNCTION OF CERVICAL REGION: ICD-10-CM

## 2022-04-25 DIAGNOSIS — M99.03 SEGMENTAL AND SOMATIC DYSFUNCTION OF LUMBAR REGION: ICD-10-CM

## 2022-04-25 DIAGNOSIS — M54.50 ACUTE LEFT-SIDED LOW BACK PAIN WITHOUT SCIATICA: ICD-10-CM

## 2022-04-25 PROCEDURE — 98940 CHIROPRACT MANJ 1-2 REGIONS: CPT | Mod: AT | Performed by: CHIROPRACTOR

## 2022-04-25 NOTE — PROGRESS NOTES
Subjective Finding:    Chief compalint: Patient presents with:  Back Pain  , Pain Scale: 9/10, Intensity: sharp, Duration: 2 days, Radiating: no.    Date of injury:     Activities that the pain restricts:   Home/household/hobbies/social activities: yes.  Work duties: yes.  Sleep: yes.  Makes symptoms better: rest.  Makes symptoms worse: activity.  Have you seen anyone else for the symptoms? No.  Work related: no.  Automobile related injury: no.    Objective and Assessment:    Posture Analysis:   High shoulder: left.  Head tilt: left.  High iliac crest: left.  Head carriage: neutral.  Thoracic Kyphosis: forward.  Lumbar Lordosis: neutral.    Lumbar Range of Motion: extension decreased.  Cervical Range of Motion: extension decreased.  Thoracic Range of Motion: extension decreased and left lateral flexion decreased.  Extremity Range of Motion: .    Palpation:   T paraspinals: sharp pain, no    Segmental dysfunction pre-treatment and treatment area: L56  T34.    Assessment post-treatment:  Cervical: ROM increased.  Thoracic: ROM increased.  Lumbar: ROM increased.    Comments: .      Complicating Factors: .    Procedure(s):  CMT:  63117 Chiropractic manipulative treatment 3-4 regions performed   Cervical: Diversified, See above for level, Supine, Thoracic: Diversified, See above for level, Prone and Lumbar: Diversified, See above for level, Side posture    Modalities:  None performed this visit    Therapeutic procedures:  None    Plan:  Treatment plan: PRN.  Instructed patient: stretch as instructed at visit.  Short term goals: reduce pain.  Long term goals: increase ADL.  Prognosis: very good.

## 2022-04-26 NOTE — PROGRESS NOTES
Subjective Finding:    Chief compalint: Patient presents with:  Back Pain  , Pain Scale: 9/10, Intensity: sharp, Duration: 2 days, Radiating: no.    Date of injury:     Activities that the pain restricts:   Home/household/hobbies/social activities: yes.  Work duties: yes.  Sleep: yes.  Makes symptoms better: rest.  Makes symptoms worse: activity.  Have you seen anyone else for the symptoms? No.  Work related: no.  Automobile related injury: no.    Objective and Assessment:    Posture Analysis:   High shoulder: left.  Head tilt: left.  High iliac crest: left.  Head carriage: neutral.  Thoracic Kyphosis: forward.  Lumbar Lordosis: neutral.    Lumbar Range of Motion: extension decreased.  Cervical Range of Motion: extension decreased.  Thoracic Range of Motion: extension decreased and left lateral flexion decreased.  Extremity Range of Motion: .    Palpation:   T paraspinals: sharp pain, no    Segmental dysfunction pre-treatment and treatment area: L56  T34.    Assessment post-treatment:  Cervical: ROM increased.  Thoracic: ROM increased.  Lumbar: ROM increased.    Comments: .      Complicating Factors: .    Procedure(s):  CMT:  29051 Chiropractic manipulative treatment 3-4 regions performed   Cervical: Diversified, See above for level, Supine, Thoracic: Diversified, See above for level, Prone and Lumbar: Diversified, See above for level, Side posture    Modalities:  None performed this visit    Therapeutic procedures:  None    Plan:  Treatment plan: PRN.  Instructed patient: stretch as instructed at visit.  Short term goals: reduce pain.  Long term goals: increase ADL.  Prognosis: very good.

## 2022-04-28 ENCOUNTER — LAB (OUTPATIENT)
Dept: LAB | Facility: HOSPITAL | Age: 67
End: 2022-04-28
Attending: FAMILY MEDICINE
Payer: COMMERCIAL

## 2022-04-28 ENCOUNTER — IMMUNIZATION (OUTPATIENT)
Dept: FAMILY MEDICINE | Facility: OTHER | Age: 67
End: 2022-04-28
Attending: FAMILY MEDICINE
Payer: COMMERCIAL

## 2022-04-28 DIAGNOSIS — R73.03 PREDIABETES: ICD-10-CM

## 2022-04-28 DIAGNOSIS — E78.2 MIXED HYPERLIPIDEMIA: ICD-10-CM

## 2022-04-28 LAB
ALBUMIN SERPL-MCNC: 4 G/DL (ref 3.4–5)
ALP SERPL-CCNC: 87 U/L (ref 40–150)
ALT SERPL W P-5'-P-CCNC: 30 U/L (ref 0–50)
ANION GAP SERPL CALCULATED.3IONS-SCNC: 3 MMOL/L (ref 3–14)
AST SERPL W P-5'-P-CCNC: 20 U/L (ref 0–45)
BILIRUB SERPL-MCNC: 0.4 MG/DL (ref 0.2–1.3)
BUN SERPL-MCNC: 19 MG/DL (ref 7–30)
CALCIUM SERPL-MCNC: 8.9 MG/DL (ref 8.5–10.1)
CHLORIDE BLD-SCNC: 106 MMOL/L (ref 94–109)
CHOLEST SERPL-MCNC: 243 MG/DL
CO2 SERPL-SCNC: 30 MMOL/L (ref 20–32)
CREAT SERPL-MCNC: 0.66 MG/DL (ref 0.52–1.04)
EST. AVERAGE GLUCOSE BLD GHB EST-MCNC: 131 MG/DL
FASTING STATUS PATIENT QL REPORTED: ABNORMAL
GFR SERPL CREATININE-BSD FRML MDRD: >90 ML/MIN/1.73M2
GLUCOSE BLD-MCNC: 139 MG/DL (ref 70–99)
HBA1C MFR BLD: 6.2 % (ref 0–5.6)
HDLC SERPL-MCNC: 38 MG/DL
LDLC SERPL CALC-MCNC: 135 MG/DL
NONHDLC SERPL-MCNC: 205 MG/DL
POTASSIUM BLD-SCNC: 3.9 MMOL/L (ref 3.4–5.3)
PROT SERPL-MCNC: 7.7 G/DL (ref 6.8–8.8)
SODIUM SERPL-SCNC: 139 MMOL/L (ref 133–144)
TRIGL SERPL-MCNC: 352 MG/DL

## 2022-04-28 PROCEDURE — 36415 COLL VENOUS BLD VENIPUNCTURE: CPT

## 2022-04-28 PROCEDURE — 83036 HEMOGLOBIN GLYCOSYLATED A1C: CPT

## 2022-04-28 PROCEDURE — 82040 ASSAY OF SERUM ALBUMIN: CPT

## 2022-04-28 PROCEDURE — 80061 LIPID PANEL: CPT

## 2022-04-28 PROCEDURE — 0054A COVID-19,PF,PFIZER (12+ YRS): CPT

## 2022-04-28 PROCEDURE — 80053 COMPREHEN METABOLIC PANEL: CPT

## 2022-04-28 PROCEDURE — 91305 COVID-19,PF,PFIZER (12+ YRS): CPT

## 2022-04-29 NOTE — PROGRESS NOTES
Assessment & Plan     Spinal stenosis, lumbar region, without neurogenic claudication  Ongoing.  Intermittent flares.  Refill flexeril.    - cyclobenzaprine (FLEXERIL) 10 MG tablet; Take 1 tablet (10 mg) by mouth 3 times daily as needed for muscle spasms    Acute reaction to stress  Ongoing.  Actually, increase to 150 mg and recheck in October.  She has some stress but adapting well.    - venlafaxine (EFFEXOR-XR) 75 MG 24 hr capsule; Take 2 capsules (150 mg) by mouth daily    Personal history of tobacco use  Update CT.    - Prof fee: Shared Decision Making for Lung Cancer Screening  - CT Chest Lung Cancer Scrn Low Dose wo; Future    Type 2 diabetes mellitus without complication, without long-term current use of insulin (H)  Discussed at length.  Ongoing stable, but overall stable but fasting is increasing.  Trigs up as well.  Going to give this till October with diet/ex and recheck and see her back.      Dyspepsia  Ongoing.  Update referral for ongoing consideration of EGD.    - Adult General Surg Referral    Gastroesophageal reflux disease without esophagitis  As above.    - Adult General Surg Referral    Colon cancer screening  Due for colon.  She was wanting to get EGD at the time as well.  Sent referral.    - Adult General Surg Referral             Tobacco Cessation:   reports that she has been smoking cigarettes. She started smoking about 46 years ago. She has a 22.50 pack-year smoking history. She has never used smokeless tobacco.          No follow-ups on file.    Yosef Gonzales MD  [facility]    Antonieta Meehan is a 66 year old who presents for the following health issues     HPI     Depression Followup    How are you doing with your depression since your last visit? No change    Are you having other symptoms that might be associated with depression? No    Have you had a significant life event?  No     Are you feeling anxious or having panic attacks?   No    Do you have any  concerns with your use of alcohol or other drugs? No    Social History     Tobacco Use     Smoking status: Current Every Day Smoker     Packs/day: 0.50     Years: 45.00     Pack years: 22.50     Types: Cigarettes     Start date: 1/1/1976     Smokeless tobacco: Never Used     Tobacco comment: Pt denied QP 7/12/2021   Substance Use Topics     Alcohol use: Yes     Alcohol/week: 0.0 standard drinks     Comment: rarely     Drug use: No     PHQ 1/25/2018 2/28/2018   PHQ-9 Total Score 14 2   Q9: Thoughts of better off dead/self-harm past 2 weeks Not at all Not at all     YOANDY-7 SCORE 2/28/2018 9/1/2020 5/3/2022   Total Score 0 0 0         Suicide Assessment Five-step Evaluation and Treatment (SAFE-T)          Review of Systems   Constitutional, HEENT, cardiovascular, pulmonary, gi and gu systems are negative, except as otherwise noted.      Objective    /64   Pulse 93   Temp 98.4  F (36.9  C)   Wt 107 kg (236 lb)   SpO2 95%   BMI 34.35 kg/m    Body mass index is 34.35 kg/m .  Physical Exam   GENERAL: healthy, alert and no distress  NECK: no adenopathy, no asymmetry, masses, or scars and thyroid normal to palpation  RESP: lungs clear to auscultation - no rales, rhonchi or wheezes  CV: regular rate and rhythm, normal S1 S2, no S3 or S4, no murmur, click or rub, no peripheral edema and peripheral pulses strong  ABDOMEN: soft, nontender, no hepatosplenomegaly, no masses and bowel sounds normal  MS: no gross musculoskeletal defects noted, no edema        Full labs reviewed.  See above.          Lung Cancer Screening Shared Decision Making Visit     Shefali Dailey, a 66 year old female, is eligible for lung cancer screening    History   Smoking Status     Current Every Day Smoker     Packs/day: 0.50     Years: 45.00     Types: Cigarettes     Start date: 1/1/1976   Smokeless Tobacco     Never Used     Comment: Pt denied QP 7/12/2021       I have discussed with patient the risks and benefits of screening for lung cancer  with low-dose CT.     The risks include:    radiation exposure: one low dose chest CT has as much ionizing radiation as about 15 chest x-rays, or 6 months of background radiation living in [place]      false positives: most findings/nodules are NOT cancer, but some might still require additional diagnostic evaluation, including biopsy    over-diagnosis: some slow growing cancers that might never have been clinically significant will be detected and treated unnecessarily     The benefit of early detection of lung cancer is contingent upon adherence to annual screening or more frequent follow up if indicated.     Furthermore, to benefit from screening, Shefali must be willing and able to undergo diagnostic procedures, if indicated. Although no specific guide is available for determining severity of comorbidities, it is reasonable to withhold screening in patients who have greater mortality risk from other diseases.     We did discuss that the best way to prevent lung cancer is to not smoke.    Some patients may value a numeric estimation of lung cancer risk when evaluating if lung cancer screening is right for them, here is one calculator:    ShouldIScreen

## 2022-05-03 ENCOUNTER — OFFICE VISIT (OUTPATIENT)
Dept: FAMILY MEDICINE | Facility: OTHER | Age: 67
End: 2022-05-03
Attending: FAMILY MEDICINE
Payer: COMMERCIAL

## 2022-05-03 VITALS
BODY MASS INDEX: 34.35 KG/M2 | OXYGEN SATURATION: 95 % | WEIGHT: 236 LBS | SYSTOLIC BLOOD PRESSURE: 134 MMHG | HEART RATE: 93 BPM | DIASTOLIC BLOOD PRESSURE: 64 MMHG | TEMPERATURE: 98.4 F

## 2022-05-03 DIAGNOSIS — K21.9 GASTROESOPHAGEAL REFLUX DISEASE WITHOUT ESOPHAGITIS: ICD-10-CM

## 2022-05-03 DIAGNOSIS — F43.0 ACUTE REACTION TO STRESS: ICD-10-CM

## 2022-05-03 DIAGNOSIS — Z87.891 PERSONAL HISTORY OF TOBACCO USE: Primary | ICD-10-CM

## 2022-05-03 DIAGNOSIS — Z12.11 COLON CANCER SCREENING: ICD-10-CM

## 2022-05-03 DIAGNOSIS — E11.9 TYPE 2 DIABETES MELLITUS WITHOUT COMPLICATION, WITHOUT LONG-TERM CURRENT USE OF INSULIN (H): ICD-10-CM

## 2022-05-03 DIAGNOSIS — M48.061 SPINAL STENOSIS, LUMBAR REGION, WITHOUT NEUROGENIC CLAUDICATION: ICD-10-CM

## 2022-05-03 DIAGNOSIS — R10.13 DYSPEPSIA: ICD-10-CM

## 2022-05-03 PROCEDURE — G0296 VISIT TO DETERM LDCT ELIG: HCPCS | Performed by: FAMILY MEDICINE

## 2022-05-03 PROCEDURE — 99214 OFFICE O/P EST MOD 30 MIN: CPT | Performed by: FAMILY MEDICINE

## 2022-05-03 RX ORDER — CYCLOBENZAPRINE HCL 10 MG
10 TABLET ORAL 3 TIMES DAILY PRN
Qty: 30 TABLET | Refills: 3 | Status: SHIPPED | OUTPATIENT
Start: 2022-05-03 | End: 2023-12-05

## 2022-05-03 RX ORDER — VENLAFAXINE HYDROCHLORIDE 75 MG/1
150 CAPSULE, EXTENDED RELEASE ORAL DAILY
Qty: 180 CAPSULE | Refills: 3 | Status: SHIPPED | OUTPATIENT
Start: 2022-05-03 | End: 2023-01-03

## 2022-05-03 ASSESSMENT — ANXIETY QUESTIONNAIRES
5. BEING SO RESTLESS THAT IT IS HARD TO SIT STILL: NOT AT ALL
2. NOT BEING ABLE TO STOP OR CONTROL WORRYING: NOT AT ALL
3. WORRYING TOO MUCH ABOUT DIFFERENT THINGS: NOT AT ALL
7. FEELING AFRAID AS IF SOMETHING AWFUL MIGHT HAPPEN: NOT AT ALL
GAD7 TOTAL SCORE: 0
IF YOU CHECKED OFF ANY PROBLEMS ON THIS QUESTIONNAIRE, HOW DIFFICULT HAVE THESE PROBLEMS MADE IT FOR YOU TO DO YOUR WORK, TAKE CARE OF THINGS AT HOME, OR GET ALONG WITH OTHER PEOPLE: NOT DIFFICULT AT ALL
1. FEELING NERVOUS, ANXIOUS, OR ON EDGE: NOT AT ALL
6. BECOMING EASILY ANNOYED OR IRRITABLE: NOT AT ALL

## 2022-05-03 ASSESSMENT — PATIENT HEALTH QUESTIONNAIRE - PHQ9: 5. POOR APPETITE OR OVEREATING: NOT AT ALL

## 2022-05-03 ASSESSMENT — PAIN SCALES - GENERAL: PAINLEVEL: NO PAIN (0)

## 2022-05-03 NOTE — NURSING NOTE
"Chief Complaint   Patient presents with     Depression       Initial /64   Pulse 93   Temp 98.4  F (36.9  C)   Wt 107 kg (236 lb)   SpO2 95%   BMI 34.35 kg/m   Estimated body mass index is 34.35 kg/m  as calculated from the following:    Height as of 3/4/21: 1.765 m (5' 9.5\").    Weight as of this encounter: 107 kg (236 lb).  Medication Reconciliation: complete  Raeann Gandhi LPN  "

## 2022-05-03 NOTE — PATIENT INSTRUCTIONS
Lung Cancer Screening   Frequently Asked Questions  If you are at high-risk for lung cancer, getting screened with low-dose computed tomography (LDCT) every year can help save your life. This handout offers answers to some of the most common questions about lung cancer screening. If you have other questions, please call [phone] ([phone]).     What is it?  Lung cancer screening uses special X-ray technology to create an image of your lung tissue. The exam is quick and easy and takes less than 10 seconds. We don t give you any medicine or use any needles. You can eat before and after the exam. You don t need to change your clothes as long as the clothing on your chest doesn t contain metal. But, you do need to be able to hold your breath for at least 6 seconds during the exam.    What is the goal of lung cancer screening?  The goal of lung cancer screening is to save lives. Many times, lung cancer is not found until a person starts having physical symptoms. Lung cancer screening can help detect lung cancer in the earliest stages when it may be easier to treat.    Who should be screened for lung cancer?  We suggest lung cancer screening for anyone who is at high-risk for lung cancer. You are in the high-risk group if you:      are between the ages of 55 and 79, and    have smoked at least 1 pack of cigarettes a day for 20 or more years, and    still smoke or have quit within the past 15 years.    However, if you have a new cough or shortness of breath, you should talk to your doctor before being screened.    Why does it matter if I have symptoms?  Certain symptoms can be a sign that you have a condition in your lungs that should be checked and treated by your doctor. These symptoms include fever, chest pain, a new or changing cough, shortness of breath that you have never felt before, coughing up blood or unexplained weight loss. Having any of these symptoms can greatly affect the results of lung  cancer screening.       Should all smokers get an LDCT lung cancer screening exam?  It depends. Lung cancer screening is for a very specific group of men and women who have a history of heavy smoking over a long period of time (see  Who should be screened for lung cancer  above).  I am in the high-risk group, but have been diagnosed with cancer in the past. Is LDCT lung cancer screening right for me?  In some cases, you should not have LDCT lung screening, such as when your doctor is already following your cancer with CT scan studies. Your doctor will help you decide if LDCT lung screening is right for you.  Do I need to have a screening exam every year?  Yes. If you are in the high-risk group described earlier, you should get an LDCT lung cancer screening exam every year until you are 79, or are no longer willing or able to undergo screening and possible procedures to diagnose and treat lung cancer.  How effective is LDCT at preventing death from lung cancer?  Studies have shown that LDCT lung cancer screening can lower the risk of death from lung cancer by 20 percent in people who are at high-risk.  What are the risks?  There are some risks and limitations of LDCT lung cancer screening. We want to make sure you understand the risks and benefits, so please let us know if you have any questions. Your doctor may want to talk with you more about these risks.    Radiation exposure: As with any exam that uses radiation, there is a very small increased risk of cancer. The amount of radiation in LDCT is small--about the same amount a person would get from a mammogram. Your doctor orders the exam when he or she feels the potential benefits outweigh the risks.    False negatives: No test is perfect, including LDCT. It is possible that you may have a medical condition, including lung cancer, that is not found during your exam. This is called a false negative result.    False positives and more testing: LDCT very often finds  something in the lung that could be cancer, but in fact is not. This is called a false positive result. False positive tests often cause anxiety. To make sure these findings are not cancer, you may need to have more tests. These tests will be done only if you give us permission. Sometimes patients need a treatment that can have side effects, such as a biopsy. For more information on false positives, see  What can I expect from the results?     Findings not related to lung cancer: Your LDCT exam also takes pictures of areas of your body next to your lungs. In a very small number of cases, the CT scan will show an abnormal finding in one of these areas, such as your kidneys, adrenal glands, liver or thyroid. This finding may not be serious, but you may need more tests. Your doctor can help you decide what other tests you may need, if any.  What can I expect from the results?  About 1 out of 4 LDCT exams will find something that may need more tests. Most of the time, these findings are lung nodules. Lung nodules are very small collections of tissue in the lung. These nodules are very common, and the vast majority--more than 97 percent--are not cancer (benign). Most are normal lymph nodes or small areas of scarring from past infections.  But, if a small lung nodule is found to be cancer, the cancer can be cured more than 90 percent of the time. To know if the nodule is cancer, we may need to get more images before your next yearly screening exam. If the nodule has suspicious features (for example, it is large, has an odd shape or grows over time), we will refer you to a specialist for further testing.  Will my doctor also get the results?  Yes. Your doctor will get a copy of your results.  Is it okay to keep smoking now that there s a cancer screening exam?  No. Tobacco is one of the strongest cancer-causing agents. It causes not only lung cancer, but other cancers and cardiovascular (heart) diseases as well. The damage  caused by smoking builds over time. This means that the longer you smoke, the higher your risk of disease. While it is never too late to quit, the sooner you quit, the better.  Where can I find help to quit smoking?  The best way to prevent lung cancer is to stop smoking. If you have already quit smoking, congratulations and keep it up! For help on quitting smoking, please call [place] at 2-908-QUITNOW ([phone]) or the [employer] at 1-929.432.4647 to find local resources near you.  One-on-one health coaching:  If you d prefer to work individually with a health care provider on tobacco cessation, we offer:      Medication Therapy Management:  Our specially trained pharmacists work closely with you and your doctor to help you quit smoking.  Call [phone] or [phone] (toll free).

## 2022-05-04 ASSESSMENT — ANXIETY QUESTIONNAIRES: GAD7 TOTAL SCORE: 0

## 2022-05-06 ENCOUNTER — HOSPITAL ENCOUNTER (OUTPATIENT)
Dept: CT IMAGING | Facility: HOSPITAL | Age: 67
Discharge: HOME | End: 2022-05-06
Attending: FAMILY MEDICINE | Admitting: FAMILY MEDICINE
Payer: COMMERCIAL

## 2022-05-06 DIAGNOSIS — Z87.891 PERSONAL HISTORY OF TOBACCO USE: ICD-10-CM

## 2022-05-06 PROCEDURE — 71271 CT THORAX LUNG CANCER SCR C-: CPT

## 2022-05-11 ENCOUNTER — PREP FOR PROCEDURE (OUTPATIENT)
Dept: SURGERY | Facility: OTHER | Age: 67
End: 2022-05-11

## 2022-05-11 ENCOUNTER — OFFICE VISIT (OUTPATIENT)
Dept: SURGERY | Facility: OTHER | Age: 67
End: 2022-05-11
Attending: SURGERY
Payer: COMMERCIAL

## 2022-05-11 VITALS — BODY MASS INDEX: 33.79 KG/M2 | HEIGHT: 70 IN | WEIGHT: 236 LBS

## 2022-05-11 DIAGNOSIS — Z12.11 COLON CANCER SCREENING: ICD-10-CM

## 2022-05-11 DIAGNOSIS — K21.9 GASTROESOPHAGEAL REFLUX DISEASE WITHOUT ESOPHAGITIS: ICD-10-CM

## 2022-05-11 DIAGNOSIS — R10.13 DYSPEPSIA: Primary | ICD-10-CM

## 2022-05-11 DIAGNOSIS — Z01.818 PREOP TESTING: Primary | ICD-10-CM

## 2022-05-11 DIAGNOSIS — K21.9 GASTROESOPHAGEAL REFLUX DISEASE, UNSPECIFIED WHETHER ESOPHAGITIS PRESENT: ICD-10-CM

## 2022-05-11 DIAGNOSIS — R10.13 DYSPEPSIA: ICD-10-CM

## 2022-05-11 NOTE — PROGRESS NOTES
Patient scheduled as a meet and greet colonoscopy per . Patient will also be getting an upper endoscopy. She is having procedure on 5/27/22 with . Patient has been scheduled for covid test on 5/24/22. She will be using gatorade prep. Patient instructions lightly given over phone, and have been inter-office mailed to her. Tiffani Hardin LPN

## 2022-05-11 NOTE — PATIENT INSTRUCTIONS
We want to your Upper Endoscopy and Colonoscopy to be as pleasant as possible. Please review the instructions below. If you have questions, you may contact us at the any of the following numbers:   [facility] Coordinator: [phone]  Clinic Nurse ([place]): [phone]  Surgery Education Nurse: [phone]      Date of procedure: 5/27/22 with Dr. Anderson  Admit Time: Hospital Surgery will call you the day before your procedure by 5pm with your arrival time. If your surgery is on Monday, expect a call on Friday.  If you are not contacted before 5pm, please call admitting at [phone].   After hours or on weekends, please call [phone] to postpone.   Call the clinic nurse if you become ill within 2 weeks of your procedure to reschedule.       COVID-19 test is needed 2-4 days before procedure. This testing is done at the upper level of [facility] (weekdays and Saturday-East entrance) or at the [facility] (weekday mornings only).    This test has been scheduled for 5/24/22 at 7:55 at the [facility] site.     Please  the following over the counter items for your bowel prep:    Two 5 mg Dulcolax (bisacodyl) tablets   One 8.3 ounce (238 g) bottle of Miralax   One 10 ounce bottle of liquid Magnesium Citrate-not capsules.   One 64 ounce bottle of Gatorade-Not red, purple, or powdered.   (you will need additional sports drink for the 2 days before colonoscopy)    7 DAYS BEFORE THE EXAM:   5/20  Call the Surgery Education Nurse at [phone] and have a medication list ready.   Stop Aspirin or NSAIDS (Ibuprofen, Celebrex, Naproxen, etc).  Stop fiber supplements, herbal supplements, vitamins, and iron.   Stop eating corn, nuts and seeds.  If you are prescribed a daily 81mg Aspirin, you may continue this.  If you are prescribed blood thinners or insulin, talk to your primary provider for instructions.    2 DAYS BEFORE THE EXAM:   5/25  Low fiber diet.   See table below.  Drink at  least 4-6 large glasses of sports drink today and tomorrow (separate from bowel prep).   Avoid red and purple dyes.  Do not eat after 11:59 pm tonight.    Low Fiber Diet    You may have Do NOT have   Starches:        White breads (tortillas, biscuits, toast, pancakes, waffles, etc.), white rice, pasta (not whole grain), cooked and peeled potatoes, plain or saltine crackers, cooked farina or cream of rice, puffed rice, corn flakes, Rice Krispies, Special K.   Starches:       Whole grain breads; Breads containing nuts, seeds or fruit, or more than 1 gram fiber per slice, cornbread, corn or whole wheat tortillas, potatoes with skin, brown rice, wild rice, kasha/buckwheat.   Vegetables:       Tender, well cooked and canned vegetables without seeds or peels including carrots, asparagus tips, green beans, wax  beans, spinach; vegetable broth Vegetables:       Any raw or steamed vegetables, vegetables with seeds, corn in any form   Fruits/Juices:        Strained fruit juice, canned fruit without seeds or skin (not pineapple,) applesauce, pear, ripe bananas, melons (not watermelon) Fruit/Juices:        Prunes, prune juice, raisins or other dried fruits, berries and other fruits with seeds, pineapple, fresh or frozen fruits not listed in other column   Milk Products:       Milk, cheese, cottage cheese, yogurt without berries, custard, ice cream without nuts/fruit Milk Products:       Any dairy product with nuts, seeds or berries   Proteins:       Tender, well-cooked ground beef, lamb, veal, ham, pork, chicken, turkey, fish or organ meats; eggs, creamy peanut butter Proteins:        Tough, fibrous meats with gristle, cooked dried beans, peas or lentils, crunchy peanut butter          Fats and condiments:        Margarine, butter, oils, fernandez, sour cream, salad dressing, plain gravy, spices, cooked herbs, sugar, clear jelly, honey, syrup Fats and condiments:        Pickles, olives, relish, horseradish, jam, marmalade, preserves    Snacks, sweets and drinks:       Pretzels, hard candy, plain cakes and cookies without nuts or seeds, gelatin, plain pudding, sherbet, popsicles, coffee, tea, carbonated beverages Snacks, sweets and drinks:       Popcorn, nuts, seeds, granola, coconut, candies or desserts with nuts/seeds and raisins/dried fruits or whole grains.         1 DAY BEFORE THE EXAM:   5/26  No solid food/milk products after 12:00 AM, midnight.   Drink only clear liquids all day, at least 8-10 glasses, including 4-6 glasses sports drink.   See table below.    Avoid anything containing red or purple dyes or alcohol.            AT 12:00 PM NOON THE DAY BEFORE EXAM:  Take 2 Dulcolax tablets by mouth with clear liquids.            AT 6:00 PM THE DAY BEFORE EXAM:  Mix the bottle of Miralax and 64 oz. of Gatorade in a pitcher.   Drink one 8 oz. glass every 10-15 minutes until gone. Stay near a toilet.     Clear Liquid Diet  You may have: Do NOT have:     ? Tea, coffee (no cream)   Milk or milk products such as ice cream, malts or shakes     ? Water, Vitamin Water, Smart Water, Coconut Water, PowerAde, Propel, Soda pop, (Sprite, 7 UP, Ginger Ale, Gatorade (not red or purple)   Red or purple drinks of any kind such as  Cranberry juice or grape juice.     ? Clear nutrition drinks (Resource Breeze, Ensure Active protein drink (peach flavor)   Red or purple Jell-O, Popsicles, Tereso-Aid, Sorbet and candy.     ? Jell-O, Popsicles (no milk or fruit pieces) or Italian ice (not red or purple)   Juices with pulp such as orange, grapefruit, pineapple or tomato juice     ? Honey/Sugar   Cream soups of any kind     ? Fat-free soup broth or bouillon   Alcohol     ? Plain hard candy, such as clear Life Savers (not red or purple)      ? Powdered Lemonade such as Crystal Light, Country Time      ? Clear juices and fruit-flavored drinks such as apple juice, white grape juice, Hi-C and Tereso-Aid (not red or purple)            TIPS FOR COLON CLEANSING BEFORE YOUR  COLONOSCOPY  To get accurate results from your exam, your colon must be completely empty or you may need to repeat the colon prep and exam. If you followed instructions and your stool is clear or yellow liquid, you are ready. If you are not sure if your colon is clean, please call the clinic nurse.    You may use Tucks wipes, hemorrhoid treatments, hydrocortisone cream or alcohol-free baby wipes to ease anal irritation. You may also use Vaseline to help protect the skin.     Quickly drink each glass. Even when you are sitting on the toilet, keep drinking every 15 minutes. If you have nausea or vomiting, take a break for 30 minutes and then resume drinking.    You will have loose watery stools and may also have chills. Dress for comfort. Expect to feel bloating, nausea and other discomfort until the stool clears from your colon.       DAY OF COLONOSCOPY/UPPER ENDOSCOPY:   5/27            6 HOURS PRIOR TO EXAM: Drink the bottle of Magnesium Citrate followed by a full glass of water.   You may have clear liquids up until 4 hours before arrival, then nothing by mouth.  If you need to take any medications after this, take them with a tiny sip of water.   If you have asthma, bring your inhaler with you.  Shower before arrival and wear clean, comfortable clothes.   No jewelry, make-up, nail polish, hair spray, lotions, or perfumes.   [place] in Admitting through the [place] Entrance.   You must have a responsible adult to drive and to stay with you for 4 hours at home.                                               SURGERY HANDBOOK            Your surgery is scheduled:    Date: 5/27/22  ________________________________    Time: hospital surgery will call the day prior to your procedure with arrival time  ________________________________      Surgeon's Name:   _______________________        Pre-Op Physical Fax Numbers:          Pre-Admissions  [phone]        Your surgery is located at:  [facility]  [address] [address]         Before Your Surgery  For Patients and Visitors at [facility]    Welcome  As you get ready for surgery, you may have a lot of questions.  This brochure will help you know what to expect before and after surgery.  You and your family are the most important members of your health care team.  You will need to take an active role in your care.    Be sure to ask questions and learn all that you can about your surgery.  If you have any safety concerns, we urge you to tell a nurse as soon as possible.   This brochure is for information only.  It does not replace the advice of your doctor.  Always follow your doctor's advice.    If you or your  are deaf or hard of hearing, or prefer a language other than English, please let us know.  We have many free services, including interpreters and other aids to help you communicate. You may ask for help  through any member of your care team or by calling Language Services at [phone], option 2.    GETTING READY FOR SURGERY  Always follow your surgeon's instructions.  If you don't, your surgery could be canceled.  Please use the following checklist.  You have been scheduled for surgery and we would like to give you some information that will assist in helping get the best possible outcome.      Before Surgery:   If for any reason you decide not to have the surgery, please contact your surgeon's office.  We can easily cancel or reschedule the procedure. If it is after hours, please call [phone].    Please keep in mind that the time of surgery is subject to change.  Make sure you have nothing to eat after midnight. If your surgery is later in the afternoon, this recommendation might change, but not until the day before surgery after the actual time of the surgery has been established.    Within 30 Days of Surgery: not needed  Have a pre-surgery physical exam with your family doctor or partner.  If you use a  [facility], all of your information from the pre-op physical will be in the M87 computer system.  Ask the doctor to send all of your results to the hospital before the surgery.  The doctor also may ask you to bring the results with you on the day of surgery or you can fax them to [phone].    Tell the doctor if:  You are allergic to latex or rubber  (Latex and rubber gloves are often used in medical care).  You are taking any medicines (including aspirin), vitamins (Vitamin E, Fish Oil, Omegas) or herbal products.  You will need to stop taking some medicines before surgery.  You have any medical problems (allergies, diabetes or heart disease, for example).  You have a pacemaker or an AICD (automatic implanted cardiac defibrillator).  If you do, please bring the ID card with you on the day of surgery.  You are a smoker.  People who smoke have a higher risk of infection after surgery.  Ask your doctor how you can quit smoking.    Within 7 days of Surgery:  Prior to your surgical procedure, a nurse will be contacting you to obtain a health history. A nurse will call you within a few days of surgery to go over these and other instructions.  If you do not hear from them, please call them at [phone].      Call your insurance company.  Ask if you need pre-approval for your surgery.  If you do not have insurance, please let us know.  Arrange for someone to drive you home after surgery.  If you will have same-day surgery, you may not drive or take public transportation home by yourself.  Arrange for someone to stay with you for 24 hours after you go home.  This person must be a responsible adult (ie- Family member or friend).    The Day Before Surgery:   Call your surgeon if there are any changes in your health.  This includes signs of a cold or flu (such as a sore throat, runny nose, cough, rash or fever).  Do not smoke, drink alcohol or take over-the-counter medicine (unless your surgeon tells you to)  for 24 hours before and after surgery.  If you take prescribed drugs:  You may need to stop them until after the surgery.  Follow your doctor's orders.  You may resume Aspirin and/or blood thinners after your surgery as directed by your physician/surgeon.  NO SOLID FOOD. CLEAR LIQUIDS ONLY.  Follow your surgeon's orders for eating and drinking.  You need to have an empty stomach before surgery.  This will make the surgery as safe as possible.  If you don't follow your doctor's orders, your surgery could be changed to another date.    The Day of Surgery:  Please remove deodorant, cologne, scented lotion, makeup, nail polish and jewelry (including rings and body piercings).  If you wear artificial nails, please remove at least one nail before coming to the hospital.  Wear clean, loose clothing to the hospital.  Bring these items to the hospital:  Your insurance card.  A list of all the medicines you take.  Include vitamins, minerals, herbs and over-the-counter drugs.  Note any drug allergies.  A copy of your advance health care directive, if you have one.  This tells us what treatment you would want -- and who would make health care decisions -- if you could no longer speak for yourself.  You may request this form in advance or download it from [email or web address].  A case for any glasses, contact lenses, hearing aids or dentures.  Your inhaler or CPAP machine, if you use these at home.  Leave extra cash, jewelry and other valuables at home.    When You Arrive:  When you get to the hospital, you will:  Check in.  If you are under age 18, you must be with a parent or legal guardian.  Electronically sign consent forms, if you haven't already.  These electronic forms state that you know the risks and benefits of surgery.  When you sign the forms, you give us permission to do the surgery.  Do not sign them unless you understand what will happen during and after your surgery.  If you have any questions about your  surgery, ask to speak with your doctor before you sign the forms.  If you don't understand the answers, ask again.  Receive a copy of the Patients Bill of Rights.  If you do not receive a copy, please ask for one.  Change into hospital clothes.  Your belongings will be placed in a bag.  We will return them to you after surgery.  Meet with the anesthesia provider.  He or she will tell you what kind of anesthesia (medicine) will be used to keep you comfortable during surgery.  Remember: It's okay to remind doctors and nurses to wash their hands before touching you.   In most cases, your surgeon will use a marker to write his or her initials on the surgery site.  This ensures that the exact site is operated on.  For safety reasons, we will ask you the same questions many times.  For example, we may ask your name and birth date over and over again.  Friends and family can stay with you until it's time for surgery.  While you're in surgery, they will be in the waiting area.  Please note that cell phones are not allowed in some patient care areas.  If you have questions about what will happen in the operating room, talk to your care team.    After Surgery:  We will move you to a recovery room where we will watch you closely.  If you have any pain or discomfort, tell your nurse.  He or she will try to make you comfortable.    If you are staying overnight we will move you to your hospital room after you are awake.  If you are going home we will move you to another room.  Friends and family may be able to join you.  The length of time you spend in recovery depends on the type of medicine you received, your medical condition, and the type of surgery you had.    Going Home:  We will let you know when you're ready to leave the hospital.  Before you leave, we will tell you how to care for yourself at home.  If you do not understand something, please say so.  We will answer any questions you have.  We will then help you get ready  to leave.  When you are discharged from the recovery room, the nurses will review instructions with you and your caregiver.  You must have an adult with you for the first 4 hours after you leave the hospital. Take it easy when you get home.  You will need some time to recover -- you may be more tired than you realize at first.  Rest and relax for rest of the day at home.  You'll feel better and heal faster if you take good care of yourself.  Symptoms of infection need to be reported to your surgery office. Please call your Surgeon.      Thank you for following these important instructions.

## 2022-05-11 NOTE — NURSING NOTE
"Chief Complaint   Patient presents with     Consult     Dyspepsia, GERD, colon cancer screening        Initial Ht 1.765 m (5' 9.5\")   Wt 107 kg (236 lb)   BMI 34.35 kg/m   Estimated body mass index is 34.35 kg/m  as calculated from the following:    Height as of this encounter: 1.765 m (5' 9.5\").    Weight as of this encounter: 107 kg (236 lb).  Medication Reconciliation: complete  Tiffani Hardin LPN  "

## 2022-05-19 ENCOUNTER — ANESTHESIA EVENT (OUTPATIENT)
Dept: SURGERY | Facility: HOSPITAL | Age: 67
End: 2022-05-19
Payer: COMMERCIAL

## 2022-05-19 ASSESSMENT — LIFESTYLE VARIABLES: TOBACCO_USE: 1

## 2022-05-19 NOTE — ANESTHESIA PREPROCEDURE EVALUATION
Anesthesia Pre-Procedure Evaluation    Patient: Shefali Dailey   MRN: [id number] : 1955        Procedure : Procedure(s):  Colonoscopy, possible biopsy, possible polypectomy with upper endoscopy          Past Medical History:   Diagnosis Date     Malignant neoplasm of breast (female), unspecified 2005      Past Surgical History:   Procedure Laterality Date     APPENDECTOMY  2004     ARTHROPLASTY KNEE Left 2019    Procedure: LEFT TOTAL KNEE/NITHIN;  Surgeon: Mat Garner MD;  Location: [facility] OR     ARTHROSCOPY KNEE Left 2015    Procedure: ARTHROSCOPY KNEE;  Surgeon: Arden Oreilly MD;  Location: [facility] OR     AS TOTAL KNEE ARTHROPLASTY Left 2019     COLONOSCOPY N/A 2012    Normal. Repeat in 10 years.      DILATION AND CURETTAGE      D&C, multiple     DISSECT LYMPH NODE AXILLA Left 2005    Performed during bilateral mastectomy.      ESOPHAGOSCOPY, GASTROSCOPY, DUODENOSCOPY (EGD), COMBINED       HYSTERECTOMY TOTAL ABDOMINAL, BILATERAL SALPINGO-OOPHORECTOMY, COMBINED Bilateral 2004    no cervix, [facility]      MAMMOPLASTY AUGMENTATION BILATERAL Bilateral 2006    Bilateral, expansion with saline implants     MASTECTOMY MODIFIED RADICAL, SENTINEL NODE, COMBINED Bilateral 2005    Bilateral, lymph nodes removed from left side     TONSILLECTOMY        BREAST BIOPSY CORE NEEDLE LEFT Left       Allergies   Allergen Reactions     Benoxinate Hcl Other (See Comments), Itching and Swelling     Burning  Fluress       Fluorescein Sodium Other (See Comments), Itching and Swelling     Burning  Fluress     Fluorescein-Benoxinate Other (See Comments)      Social History     Tobacco Use     Smoking status: Current Every Day Smoker     Packs/day: 0.50     Years: 45.00     Pack years: 22.50     Types: Cigarettes     Start date: 1976     Smokeless tobacco: Never Used     Tobacco comment: Pt denied QP 2021   Substance Use Topics     Alcohol use:  Yes     Alcohol/week: 0.0 standard drinks     Comment: rarely      Wt Readings from Last 1 Encounters:   05/11/22 107 kg (236 lb)        Anesthesia Evaluation   Pt has had prior anesthetic.     No history of anesthetic complications       ROS/MED HX  ENT/Pulmonary:     (+) tobacco use, Current use,     Neurologic:  - neg neurologic ROS     Cardiovascular:  - neg cardiovascular ROS     METS/Exercise Tolerance:     Hematologic:  - neg hematologic  ROS     Musculoskeletal: Comment: Spinal stenosis - neg musculoskeletal ROS     GI/Hepatic:     (+) GERD, Asymptomatic on medication, bowel prep,     Renal/Genitourinary:  - neg Renal ROS     Endo:     (+) type II DM,     Psychiatric/Substance Use:  - neg psychiatric ROS     Infectious Disease:  - neg infectious disease ROS     Malignancy:   (+) Malignancy, History of Breast.    Other:  - neg other ROS          Physical Exam    Airway  airway exam normal      Mallampati: II       Respiratory Devices and Support         Dental  no notable dental history         Cardiovascular   cardiovascular exam normal       Rhythm and rate: regular and normal     Pulmonary   pulmonary exam normal        breath sounds clear to auscultation           OUTSIDE LABS:  CBC:   Lab Results   Component Value Date    WBC 8.1 02/24/2020    WBC 7.8 09/03/2019    HGB 14.1 02/24/2020    HGB 11.3 (L) 09/13/2019    HCT 42.6 02/24/2020    HCT 42.5 09/03/2019     02/24/2020     09/03/2019     BMP:   Lab Results   Component Value Date     04/28/2022     08/31/2020    POTASSIUM 3.9 04/28/2022    POTASSIUM 4.0 08/31/2020    CHLORIDE 106 04/28/2022    CHLORIDE 110 (H) 08/31/2020    CO2 30 04/28/2022    CO2 27 08/31/2020    BUN 19 04/28/2022    BUN 21 08/31/2020    CR 0.66 04/28/2022    CR 0.66 08/31/2020     (H) 04/28/2022     (H) 02/23/2021     COAGS: No results found for: PTT, INR, FIBR  POC: No results found for: BGM, HCG, HCGS  HEPATIC:   Lab Results   Component Value  Date    ALBUMIN 4.0 04/28/2022    PROTTOTAL 7.7 04/28/2022    ALT 30 04/28/2022    AST 20 04/28/2022    ALKPHOS 87 04/28/2022    BILITOTAL 0.4 04/28/2022     OTHER:   Lab Results   Component Value Date    A1C 6.2 (H) 04/28/2022    LISA 8.9 04/28/2022    LIPASE 105 02/24/2020    CRP <2.9 02/24/2020    SED 8 09/03/2019       Anesthesia Plan    ASA Status:  2   NPO Status:  NPO Appropriate    Anesthesia Type: MAC.     - Reason for MAC: chronic cardiopulmonary disease, straight local not clinically adequate              Consents    Anesthesia Plan(s) and associated risks, benefits, and realistic alternatives discussed. Questions answered and patient/representative(s) expressed understanding.     - Discussed: Risks, Benefits and Alternatives for BOTH SEDATION and the PROCEDURE were discussed     - Discussed with:  Patient      - Extended Intubation/Ventilatory Support Discussed: No.      - Patient is DNR/DNI Status: No    Use of blood products discussed: No .     Postoperative Care            Comments:                RENNY Castillo CRNA

## 2022-05-24 ENCOUNTER — OFFICE VISIT (OUTPATIENT)
Dept: FAMILY MEDICINE | Facility: OTHER | Age: 67
End: 2022-05-24
Attending: FAMILY MEDICINE
Payer: COMMERCIAL

## 2022-05-24 DIAGNOSIS — Z01.818 PREOP TESTING: ICD-10-CM

## 2022-05-24 LAB — SARS-COV-2 RNA RESP QL NAA+PROBE: NEGATIVE

## 2022-05-24 PROCEDURE — U0005 INFEC AGEN DETEC AMPLI PROBE: HCPCS

## 2022-05-24 PROCEDURE — U0003 INFECTIOUS AGENT DETECTION BY NUCLEIC ACID (DNA OR RNA); SEVERE ACUTE RESPIRATORY SYNDROME CORONAVIRUS 2 (SARS-COV-2) (CORONAVIRUS DISEASE [COVID-19]), AMPLIFIED PROBE TECHNIQUE, MAKING USE OF HIGH THROUGHPUT TECHNOLOGIES AS DESCRIBED BY CMS-2020-01-R: HCPCS

## 2022-05-27 ENCOUNTER — HOSPITAL ENCOUNTER (OUTPATIENT)
Facility: HOSPITAL | Age: 67
Discharge: HOME | End: 2022-05-27
Attending: SURGERY | Admitting: SURGERY
Payer: COMMERCIAL

## 2022-05-27 ENCOUNTER — ANESTHESIA (OUTPATIENT)
Dept: SURGERY | Facility: HOSPITAL | Age: 67
End: 2022-05-27
Payer: COMMERCIAL

## 2022-05-27 VITALS
DIASTOLIC BLOOD PRESSURE: 94 MMHG | OXYGEN SATURATION: 98 % | TEMPERATURE: 97 F | HEART RATE: 68 BPM | RESPIRATION RATE: 16 BRPM | SYSTOLIC BLOOD PRESSURE: 157 MMHG | BODY MASS INDEX: 32.93 KG/M2 | WEIGHT: 230 LBS | HEIGHT: 70 IN

## 2022-05-27 PROCEDURE — 43239 EGD BIOPSY SINGLE/MULTIPLE: CPT | Performed by: SURGERY

## 2022-05-27 PROCEDURE — 250N000011 HC RX IP 250 OP 636: Performed by: NURSE ANESTHETIST, CERTIFIED REGISTERED

## 2022-05-27 PROCEDURE — 45380 COLONOSCOPY AND BIOPSY: CPT | Mod: PT | Performed by: SURGERY

## 2022-05-27 PROCEDURE — 250N000009 HC RX 250: Performed by: NURSE ANESTHETIST, CERTIFIED REGISTERED

## 2022-05-27 PROCEDURE — 45385 COLONOSCOPY W/LESION REMOVAL: CPT | Mod: PT | Performed by: SURGERY

## 2022-05-27 PROCEDURE — 88305 TISSUE EXAM BY PATHOLOGIST: CPT | Mod: TC | Performed by: SURGERY

## 2022-05-27 PROCEDURE — 710N000012 HC RECOVERY PHASE 2, PER MINUTE: Performed by: SURGERY

## 2022-05-27 PROCEDURE — 88305 TISSUE EXAM BY PATHOLOGIST: CPT | Mod: 26 | Performed by: PATHOLOGY

## 2022-05-27 PROCEDURE — 258N000003 HC RX IP 258 OP 636: Performed by: NURSE ANESTHETIST, CERTIFIED REGISTERED

## 2022-05-27 PROCEDURE — 272N000001 HC OR GENERAL SUPPLY STERILE: Performed by: SURGERY

## 2022-05-27 PROCEDURE — 370N000017 HC ANESTHESIA TECHNICAL FEE, PER MIN: Performed by: SURGERY

## 2022-05-27 PROCEDURE — 43239 EGD BIOPSY SINGLE/MULTIPLE: CPT | Performed by: NURSE ANESTHETIST, CERTIFIED REGISTERED

## 2022-05-27 PROCEDURE — 360N000075 HC SURGERY LEVEL 2, PER MIN: Performed by: SURGERY

## 2022-05-27 PROCEDURE — 2894A SURGICAL PATHOLOGY EXAM: CPT | Mod: 26 | Performed by: PATHOLOGY

## 2022-05-27 PROCEDURE — 999N000141 HC STATISTIC PRE-PROCEDURE NURSING ASSESSMENT: Performed by: SURGERY

## 2022-05-27 RX ORDER — ALBUTEROL SULFATE 0.83 MG/ML
2.5 SOLUTION RESPIRATORY (INHALATION) EVERY 4 HOURS PRN
Status: DISCONTINUED | OUTPATIENT
Start: 2022-05-27 | End: 2022-05-27 | Stop reason: HOSPADM

## 2022-05-27 RX ORDER — NALOXONE HYDROCHLORIDE 0.4 MG/ML
0.2 INJECTION, SOLUTION INTRAMUSCULAR; INTRAVENOUS; SUBCUTANEOUS
Status: DISCONTINUED | OUTPATIENT
Start: 2022-05-27 | End: 2022-05-27 | Stop reason: HOSPADM

## 2022-05-27 RX ORDER — MEPERIDINE HYDROCHLORIDE 25 MG/ML
12.5 INJECTION INTRAMUSCULAR; INTRAVENOUS; SUBCUTANEOUS
Status: DISCONTINUED | OUTPATIENT
Start: 2022-05-27 | End: 2022-05-27 | Stop reason: HOSPADM

## 2022-05-27 RX ORDER — NALOXONE HYDROCHLORIDE 0.4 MG/ML
0.4 INJECTION, SOLUTION INTRAMUSCULAR; INTRAVENOUS; SUBCUTANEOUS
Status: DISCONTINUED | OUTPATIENT
Start: 2022-05-27 | End: 2022-05-27 | Stop reason: HOSPADM

## 2022-05-27 RX ORDER — PROPOFOL 10 MG/ML
INJECTION, EMULSION INTRAVENOUS PRN
Status: DISCONTINUED | OUTPATIENT
Start: 2022-05-27 | End: 2022-05-27

## 2022-05-27 RX ORDER — SODIUM CHLORIDE, SODIUM LACTATE, POTASSIUM CHLORIDE, CALCIUM CHLORIDE 600; 310; 30; 20 MG/100ML; MG/100ML; MG/100ML; MG/100ML
INJECTION, SOLUTION INTRAVENOUS CONTINUOUS
Status: DISCONTINUED | OUTPATIENT
Start: 2022-05-27 | End: 2022-05-27 | Stop reason: HOSPADM

## 2022-05-27 RX ORDER — ONDANSETRON 2 MG/ML
INJECTION INTRAMUSCULAR; INTRAVENOUS PRN
Status: DISCONTINUED | OUTPATIENT
Start: 2022-05-27 | End: 2022-05-27

## 2022-05-27 RX ORDER — ONDANSETRON 2 MG/ML
4 INJECTION INTRAMUSCULAR; INTRAVENOUS EVERY 30 MIN PRN
Status: DISCONTINUED | OUTPATIENT
Start: 2022-05-27 | End: 2022-05-27 | Stop reason: HOSPADM

## 2022-05-27 RX ORDER — LABETALOL 20 MG/4 ML (5 MG/ML) INTRAVENOUS SYRINGE
10
Status: DISCONTINUED | OUTPATIENT
Start: 2022-05-27 | End: 2022-05-27 | Stop reason: HOSPADM

## 2022-05-27 RX ORDER — LIDOCAINE HYDROCHLORIDE 20 MG/ML
INJECTION, SOLUTION INFILTRATION; PERINEURAL PRN
Status: DISCONTINUED | OUTPATIENT
Start: 2022-05-27 | End: 2022-05-27

## 2022-05-27 RX ORDER — FLUMAZENIL 0.1 MG/ML
0.2 INJECTION, SOLUTION INTRAVENOUS
Status: DISCONTINUED | OUTPATIENT
Start: 2022-05-27 | End: 2022-05-27 | Stop reason: HOSPADM

## 2022-05-27 RX ORDER — FENTANYL CITRATE 50 UG/ML
25 INJECTION, SOLUTION INTRAMUSCULAR; INTRAVENOUS EVERY 5 MIN PRN
Status: DISCONTINUED | OUTPATIENT
Start: 2022-05-27 | End: 2022-05-27 | Stop reason: HOSPADM

## 2022-05-27 RX ORDER — LIDOCAINE 40 MG/G
CREAM TOPICAL
Status: DISCONTINUED | OUTPATIENT
Start: 2022-05-27 | End: 2022-05-27 | Stop reason: HOSPADM

## 2022-05-27 RX ORDER — ONDANSETRON 4 MG/1
4 TABLET, ORALLY DISINTEGRATING ORAL EVERY 30 MIN PRN
Status: DISCONTINUED | OUTPATIENT
Start: 2022-05-27 | End: 2022-05-27 | Stop reason: HOSPADM

## 2022-05-27 RX ORDER — FENTANYL CITRATE 50 UG/ML
25 INJECTION, SOLUTION INTRAMUSCULAR; INTRAVENOUS
Status: DISCONTINUED | OUTPATIENT
Start: 2022-05-27 | End: 2022-05-27 | Stop reason: HOSPADM

## 2022-05-27 RX ADMIN — PROPOFOL 30 MG: 10 INJECTION, EMULSION INTRAVENOUS at 09:31

## 2022-05-27 RX ADMIN — SODIUM CHLORIDE, POTASSIUM CHLORIDE, SODIUM LACTATE AND CALCIUM CHLORIDE: 600; 310; 30; 20 INJECTION, SOLUTION INTRAVENOUS at 07:19

## 2022-05-27 RX ADMIN — PROPOFOL 50 MG: 10 INJECTION, EMULSION INTRAVENOUS at 09:19

## 2022-05-27 RX ADMIN — PROPOFOL 20 MG: 10 INJECTION, EMULSION INTRAVENOUS at 09:07

## 2022-05-27 RX ADMIN — PROPOFOL 40 MG: 10 INJECTION, EMULSION INTRAVENOUS at 09:26

## 2022-05-27 RX ADMIN — PROPOFOL 50 MG: 10 INJECTION, EMULSION INTRAVENOUS at 09:12

## 2022-05-27 RX ADMIN — LIDOCAINE HYDROCHLORIDE 80 MG: 20 INJECTION, SOLUTION INFILTRATION; PERINEURAL at 09:00

## 2022-05-27 RX ADMIN — PROPOFOL 50 MG: 10 INJECTION, EMULSION INTRAVENOUS at 09:02

## 2022-05-27 RX ADMIN — PROPOFOL 70 MG: 10 INJECTION, EMULSION INTRAVENOUS at 09:00

## 2022-05-27 RX ADMIN — FAMOTIDINE 20 MG: 10 INJECTION, SOLUTION INTRAVENOUS at 10:10

## 2022-05-27 RX ADMIN — PROPOFOL 60 MG: 10 INJECTION, EMULSION INTRAVENOUS at 09:04

## 2022-05-27 RX ADMIN — PROPOFOL 30 MG: 10 INJECTION, EMULSION INTRAVENOUS at 09:34

## 2022-05-27 RX ADMIN — PROPOFOL 50 MG: 10 INJECTION, EMULSION INTRAVENOUS at 09:23

## 2022-05-27 RX ADMIN — ONDANSETRON 4 MG: 2 INJECTION INTRAMUSCULAR; INTRAVENOUS at 09:33

## 2022-05-27 RX ADMIN — PROPOFOL 50 MG: 10 INJECTION, EMULSION INTRAVENOUS at 09:18

## 2022-05-27 NOTE — DISCHARGE INSTRUCTIONS
You had four colon polyps removed today and biopsies taken in your stomach.  Dr. Anderson's office will call you with pathology results when they become available.             Post-Anesthesia Patient Instructions    IMMEDIATELY FOLLOWING SURGERY:  Do not drive or operate machinery for the first twenty four hours after surgery.  Do not make any important decisions for twenty four hours after surgery or while taking narcotic pain medications or sedatives.  If you develop intractable nausea and vomiting or a severe headache please notify your doctor immediately.    FOLLOW-UP:  Please make an appointment with your surgeon as instructed. You do not need to follow up with anesthesia unless specifically instructed to do so.    WOUND CARE INSTRUCTIONS (if applicable):  Keep a dry clean dressing on the anesthesia/puncture wound site if there is drainage.  Once the wound has quit draining you may leave it open to air.  Generally you should leave the bandage intact for twenty four hours unless there is drainage.  If the epidural site drains for more than 36-48 hours please call the anesthesia department.    QUESTIONS?:  Please feel free to call your physician or the hospital  if you have any questions, and they will be happy to assist you.             UPPER ENDOSCOPY    AFTER THE PROCEDURE  You will return to Same Day Surgery to rest for about an hour before you go home.  The doctor will talk with you and your family.  A family member/friend may visit with you.  You may burp up any air remaining in your stomach.  You may feel dizzy or light-headed from the medicine.  Your nurse will go over the discharge instructions with you and your caregiver and answer any of your questions.    You will be contacted the next day to check on how you are doing.  If biopsies were taken, you will be contacted with the results usually within 3 days.  BACK AT HOME  Rest for an hour or two after you get home.  When your throat is no longer  numb and you have a gag reflex, take a few sips of cool water.  If you can swallow comfortably, you may start eating again.  You may have a mild sore throat for the rest of the day.  You will be contacted with results by the surgeons office within a week. If not contacted in one week, call the surgeons office.  WHAT TO WATCH FOR:  Problems rarely occur after the exam, but it is important to be aware of the early signs of a complication.  Call your doctor immediately if you have:  Difficulty swallowing or breathing  Unusual pain in your stomach or chest  Vomiting blood or dark material that looks like coffee grounds  Black or tarry stools  Temperature over 101.5 degrees    MORE QUESTIONS?  Please ask your doctor or nurse before the exam begins  or call your doctor at the clinic.    IF YOU MUST CANCEL YOUR PROCEDURE THE EVENING/NIGHT BEFORE, PLEASE CALL HOSPITAL ADMITTING [phone] OR TOLL FREE [phone], EXT. [phone].    Phone Numbers:  [facility] - [phone] [phone]  [facility] - [phone]  Surgery Patient Education - [phone] or toll free [phone]    INSTRUCTIONS AFTER COLONOSCOPY    WHEN YOU ARE BACK HOME:  Plan to rest for an hour or two after you get home.  You may have some cramping or pressure until you pass gas.  You may resume your regular medications.  Eat a small, light meal at first, and then gradually return to normal meal sizes.  You will be contacted the next day to see how you are doing.  If you had a polyp removed:  Slight bleeding may occur.  You may have a slight blood stain on the toilet paper after a bowel movement.  To lessen the chance of bleeding, avoid heavy exercise for ONE WEEK.  This includes heavy lifting, vigorous sport activities, and heavy physical labor.  You may resume your normal sexual activity.    Avoid aspirin or aspirin products if instructed by your doctor.  If there is a polyp or biopsy, you will be contacted with results within one week.      WHAT TO WATCH FOR:  Problems rarely occur after the exam; however, it is important for you to watch for early signs of possible problems.  If you have   Unusual pain in your abdomen  Nausea and vomiting that persists  Excessive bleeding  Black or bloody bowel movements  Fever or temperature above 100.6 F  Please call your doctor ([facility] [phone]) or go to the nearest hospital emergency room.

## 2022-05-27 NOTE — ANESTHESIA CARE TRANSFER NOTE
Patient: Shefali Dailey    Procedure: Procedure(s):  Colonoscopy with polypectomy and upper endoscopy with biopsy       Diagnosis: Dyspepsia [R10.13]  Gastroesophageal reflux disease, unspecified whether esophagitis present [K21.9]  Colon cancer screening [Z12.11]  Diagnosis Additional Information: No value filed.    Anesthesia Type:   MAC     Note:      Level of Consciousness: awake  Oxygen Supplementation: room air    Independent Airway: airway patency satisfactory and stable  Dentition: dentition unchanged  Vital Signs Stable: post-procedure vital signs reviewed and stable  Report to RN Given: handoff report given  Patient transferred to: Phase II    Handoff Report: Identifed the Patient, Identified the Reponsible Provider, Reviewed the pertinent medical history, Discussed the surgical course, Reviewed Intra-OP anesthesia mangement and issues during anesthesia, Set expectations for post-procedure period and Allowed opportunity for questions and acknowledgement of understanding      Vitals:  Vitals Value Taken Time   BP     Temp     Pulse     Resp     SpO2         Electronically Signed By: RENNY Moreira CRNA  May 27, 2022  9:42 AM

## 2022-05-27 NOTE — OR NURSING
Patient and responsible adult given discharge instructions with no questions regarding instructions. Saige score 20/20. Pain level 1/10.  Discharged from unit via ambulation. Patient discharged to home with significant other.  Dr. Anderson in to see patient prior to discharge.    Patient reports improvement of burning in stomach and nausea resolved prior to discharge.   Pepcid helped burning in stomach.

## 2022-05-27 NOTE — ANESTHESIA POSTPROCEDURE EVALUATION
Patient: Shefali Dailey    Procedure: Procedure(s):  Colonoscopy with polypectomy and upper endoscopy with biopsy       Anesthesia Type:  MAC    Note:  Disposition: Outpatient   Postop Pain Control: Uneventful            Sign Out: Well controlled pain   PONV: No   Neuro/Psych: Uneventful            Sign Out: Acceptable/Baseline neuro status   Airway/Respiratory: Uneventful            Sign Out: Acceptable/Baseline resp. status   CV/Hemodynamics: Uneventful            Sign Out: Acceptable CV status; No obvious hypovolemia; No obvious fluid overload   Other NRE: NONE   DID A NON-ROUTINE EVENT OCCUR? No           Last vitals:  Vitals Value Taken Time   /94 05/27/22 1027   Temp 97  F (36.1  C) 05/27/22 1027   Pulse 68 05/27/22 1027   Resp 16 05/27/22 1027   SpO2 96 % 05/27/22 1028   Vitals shown include unvalidated device data.    Electronically Signed By: RENNY Moreira CRNA  May 27, 2022  10:57 AM

## 2022-05-27 NOTE — OP NOTE
Shefali Dailey MRN# [id number]   YOB: 1955 Age: 66 year old      Date of Admission:  5/27/2022  Date of Service:   5/27/22    Primary care provider: Yosef Gonzales    PREOPERATIVE DIAGNOSIS:  Epigastric pain, colon cancer screening        POSTOPERATIVE DIAGNOSIS:  Colon polyps x  4, duodenal polyp, Mild diverticulosis         PROCEDURE:  Colonoscopy with polypectomy, upper endoscopy with biopsy           INDICATIONS:  Screening colonoscopy, epigastric pain.       Specimen:   ID Type Source Tests Collected by Time Destination   1 : Duodenal Polyp Biopsy Biopsy Small Intestine, Duodenum SURGICAL PATHOLOGY EXAM Guevara Anderson MD 5/27/2022  9:07 AM    2 : Antrum Biopsy Biopsy Stomach, Antrum SURGICAL PATHOLOGY EXAM Guevara Anderson MD 5/27/2022  9:10 AM    3 : GE Junciton Biopsy Biopsy Gastric Esophageal Junction SURGICAL PATHOLOGY EXAM Guevara Anderson MD 5/27/2022  9:11 AM    4 : Ascending Colon Polyp Polyp Large Intestine, Colon, Ascending SURGICAL PATHOLOGY EXAM Guevara Anderson MD 5/27/2022  9:27 AM    5 : Sigmoid Colon Polyp Polyp Large Intestine, Colon, Sigmoid SURGICAL PATHOLOGY EXAM Guevara Anderson MD 5/27/2022  9:35 AM    6 : Rectal Polyp Polyp Rectum SURGICAL PATHOLOGY EXAM Guevara Anderson MD 5/27/2022  9:36 AM    7 : Rectal Polyp x2 Polyp Rectum SURGICAL PATHOLOGY EXAM Guevara Anderson MD 5/27/2022  9:37 AM        SURGEON: Guevara Anderson MD      PROCEDURE:  With the patient in the supine position on the transport cart, IV sedation was administered by the nurse anesthetist.  The patient's correct identity and planned procedure were confirmed during a requisite timeout pause.  A bite block was placed and the fiberoptic endoscope was introduced and negotiated through the cricopharyngeus without difficulty.  The length of the esophagus was examined without findings.  The gastroesophageal junction was noted 40 cm from the incisors; the Z line was regular without ulceration, bleeding source  or stricture.  There was no  evidence of Greene's change.  The stomach was entered and the pylorus was traversed easily.  The gastric mucosa was normal; there was no evidence of gastric polyp, ulcer or bleeding source.  The duodenum was was noted to have a pedunculated polyp which was biopsied.  The endoscope was returned to the body of the stomach and retroflex confirmed the absence of abnormality in the cardia.      Random cold forceps biopsies were obtained of the antrum for H. pylori.  Hemostasis was judged adequate on visualization.   The endoscope was returned to the GE junction.  Circumferential biopsies were obtained; hemostasis was satisfactory.  A second circumferential examination of the esophagus was performed on slow withdrawal of the endoscope without additional finding.        Our attention then turned to the , the external anus was inspected and was normal . Digital rectal exam was normal. The colonoscope was inserted and advanced under direct visualization to the level of the cecum which was identified by the appendiceal orifice and the ileocecal valve. The prep was excellent.. Upon slow withdrawal of the colonoscope, approximately 95% of the mucosa was directly visualized. There was mild sigmoid diverticulosis, There was a flat polyp in ascending colon removed with cold snare. There were additional small polyps in the above locations removed with biopsy forceps.  The rest of the colon was without mucosal abnormality. There was no evidence of further polyps, inflammation, bleeding or AVMs. Retroflexion of the rectum showed rectal polyp which was removed with biopsy forceps. The extra air was removed from the colon, and the colonoscope withdrawn. The patient tolerated the procedure well and was taken to postanesthesia care unit.     We invite the patient to return in 3-5 years for follow up screening evaluation.    Guevara Anderson MD

## 2022-05-27 NOTE — H&P
[facility] Surgery Consultation    CC:  Reflux, colonoscopy     HPI:  This 66 year old year old female is seen at the request of Dr. Gonzales for evaluation of GERD, frequent episodes of chest pain. Cardiac workup negative. Last 10 minutes. Goes away. Sister with colon cancer.     Past Medical History:   Diagnosis Date     Malignant neoplasm of breast (female), unspecified 11/28/2005       Past Surgical History:   Procedure Laterality Date     APPENDECTOMY  2004     ARTHROPLASTY KNEE Left 09/12/2019    Procedure: LEFT TOTAL KNEE/NITHIN;  Surgeon: Mat Garner MD;  Location: [facility] OR     ARTHROSCOPY KNEE Left 04/09/2015    Procedure: ARTHROSCOPY KNEE;  Surgeon: Arden Oreilly MD;  Location: [facility] OR     AS TOTAL KNEE ARTHROPLASTY Left 09/01/2019     COLONOSCOPY N/A 04/25/2012    Normal. Repeat in 10 years.      DILATION AND CURETTAGE      D&C, multiple     DISSECT LYMPH NODE AXILLA Left 01/01/2005    Performed during bilateral mastectomy.      ESOPHAGOSCOPY, GASTROSCOPY, DUODENOSCOPY (EGD), COMBINED  2012     HYSTERECTOMY TOTAL ABDOMINAL, BILATERAL SALPINGO-OOPHORECTOMY, COMBINED Bilateral 08/26/2004    no cervix, [facility]      MAMMOPLASTY AUGMENTATION BILATERAL Bilateral 06/2006    Bilateral, expansion with saline implants     MASTECTOMY MODIFIED RADICAL, SENTINEL NODE, COMBINED Bilateral 01/01/2005    Bilateral, lymph nodes removed from left side     TONSILLECTOMY  1961      BREAST BIOPSY CORE NEEDLE LEFT Left 2005       Allergies   Allergen Reactions     Benoxinate Hcl Other (See Comments), Itching and Swelling     Burning  Fluress       Fluorescein Sodium Other (See Comments), Itching and Swelling     Burning  Fluress     Fluorescein-Benoxinate Other (See Comments)       Current Facility-Administered Medications   Medication     lactated ringers infusion     lidocaine (LMX4) cream     lidocaine 1 % 0.1-1 mL     sodium chloride (PF) 0.9% PF flush 3 mL     sodium chloride (PF) 0.9% PF flush  "3 mL       HABITS:    Social History     Tobacco Use     Smoking status: Current Every Day Smoker     Packs/day: 0.50     Years: 45.00     Pack years: 22.50     Types: Cigarettes     Start date: 1/1/1976     Smokeless tobacco: Never Used     Tobacco comment: Pt denied QP 7/12/2021   Substance Use Topics     Alcohol use: Yes     Alcohol/week: 0.0 standard drinks     Comment: rarely     Drug use: No       Family History   Problem Relation Age of Onset     Diabetes Mother      Myocardial Infarction Mother 72        myocardial infarction, cause of death     Other - See Comments Father 63        liver cirrhosis     Cerebrovascular Disease Father 64        cva, cause of death     Breast Cancer Daughter      Other - See Comments Sister         cholelithiasis     Other - See Comments Sister      Diabetes Sister         IDDM     Cancer - colorectal Sister         59     Diabetes Sister         diet controlled     Other - See Comments Sister         billary cirrhosis, stage 4       REVIEW OF SYSTEMS:  Ten point review of systems negative except those mentioned in the HPI.     OBJECTIVE:    /91   Pulse 88   Temp 98.3  F (36.8  C) (Tympanic)   Resp 18   Ht 1.778 m (5' 10\")   Wt 104.3 kg (230 lb)   SpO2 96%   BMI 33.00 kg/m      GENERAL: Generally appears well, in no distress with appropriate affect.  HEENT:   Sclerae anicteric - normocephalic atraumatic   Respiratory:  No acute distress, no splinting,CTAB   Cardiovascular:  Regular Rate and Rhythm, no murmur   Abdomen: non-distended   :  deferred  Extremities:  Extremities normal. No deformities, edema, or skin discoloration.  Skin:  no suspicious lesions or rashes  Neurological: grossly intact  Psych:  Alert, oriented, affect appropriate with normal decision making ability.    IMPRESSION:    GERD and family history of colon cancer, plan for upper and lower endoscopy.      PLAN:    Upper and lower endoscopy.     Guevara Anderson MD,     5/27/2022  8:35 AM      "

## 2022-06-01 LAB
PATH REPORT.COMMENTS IMP SPEC: NORMAL
PATH REPORT.COMMENTS IMP SPEC: NORMAL
PATH REPORT.FINAL DX SPEC: NORMAL
PATH REPORT.GROSS SPEC: NORMAL
PATH REPORT.MICROSCOPIC SPEC OTHER STN: NORMAL
PATH REPORT.RELEVANT HX SPEC: NORMAL
PHOTO IMAGE: NORMAL

## 2022-09-04 ENCOUNTER — HEALTH MAINTENANCE LETTER (OUTPATIENT)
Age: 67
End: 2022-09-04

## 2022-11-06 ENCOUNTER — HOSPITAL ENCOUNTER (EMERGENCY)
Facility: HOSPITAL | Age: 67
Discharge: HOME | End: 2022-11-06
Attending: EMERGENCY MEDICINE | Admitting: EMERGENCY MEDICINE
Payer: COMMERCIAL

## 2022-11-06 VITALS
DIASTOLIC BLOOD PRESSURE: 94 MMHG | TEMPERATURE: 97.5 F | HEART RATE: 72 BPM | RESPIRATION RATE: 18 BRPM | OXYGEN SATURATION: 95 % | SYSTOLIC BLOOD PRESSURE: 165 MMHG

## 2022-11-06 DIAGNOSIS — L50.9 URTICARIA: ICD-10-CM

## 2022-11-06 LAB
HOLD SPECIMEN: NORMAL

## 2022-11-06 PROCEDURE — 96374 THER/PROPH/DIAG INJ IV PUSH: CPT

## 2022-11-06 PROCEDURE — 96375 TX/PRO/DX INJ NEW DRUG ADDON: CPT

## 2022-11-06 PROCEDURE — 99284 EMERGENCY DEPT VISIT MOD MDM: CPT | Mod: 25

## 2022-11-06 PROCEDURE — 250N000011 HC RX IP 250 OP 636: Performed by: EMERGENCY MEDICINE

## 2022-11-06 PROCEDURE — 99284 EMERGENCY DEPT VISIT MOD MDM: CPT | Performed by: EMERGENCY MEDICINE

## 2022-11-06 RX ORDER — PREDNISONE 20 MG/1
TABLET ORAL
Qty: 10 TABLET | Refills: 0 | Status: SHIPPED | OUTPATIENT
Start: 2022-11-06 | End: 2022-11-13

## 2022-11-06 RX ORDER — FAMOTIDINE 40 MG/1
40 TABLET, FILM COATED ORAL DAILY
Qty: 7 TABLET | Refills: 0 | Status: SHIPPED | OUTPATIENT
Start: 2022-11-06 | End: 2022-11-13

## 2022-11-06 RX ORDER — METHYLPREDNISOLONE SODIUM SUCCINATE 125 MG/2ML
125 INJECTION, POWDER, LYOPHILIZED, FOR SOLUTION INTRAMUSCULAR; INTRAVENOUS ONCE
Status: COMPLETED | OUTPATIENT
Start: 2022-11-06 | End: 2022-11-06

## 2022-11-06 RX ORDER — DIPHENHYDRAMINE HYDROCHLORIDE 50 MG/ML
50 INJECTION INTRAMUSCULAR; INTRAVENOUS ONCE
Status: COMPLETED | OUTPATIENT
Start: 2022-11-06 | End: 2022-11-06

## 2022-11-06 RX ADMIN — FAMOTIDINE 20 MG: 10 INJECTION, SOLUTION INTRAVENOUS at 08:22

## 2022-11-06 RX ADMIN — DIPHENHYDRAMINE HYDROCHLORIDE 50 MG: 50 INJECTION, SOLUTION INTRAMUSCULAR; INTRAVENOUS at 08:22

## 2022-11-06 RX ADMIN — METHYLPREDNISOLONE SODIUM SUCCINATE 125 MG: 125 INJECTION, POWDER, FOR SOLUTION INTRAMUSCULAR; INTRAVENOUS at 08:22

## 2022-11-06 ASSESSMENT — ENCOUNTER SYMPTOMS
GASTROINTESTINAL NEGATIVE: 1
NEUROLOGICAL NEGATIVE: 1
CARDIOVASCULAR NEGATIVE: 1
MUSCULOSKELETAL NEGATIVE: 1
EYES NEGATIVE: 1
RESPIRATORY NEGATIVE: 1
CONSTITUTIONAL NEGATIVE: 1

## 2022-11-06 ASSESSMENT — ACTIVITIES OF DAILY LIVING (ADL): ADLS_ACUITY_SCORE: 35

## 2022-11-06 NOTE — ED NOTES
Patient ambulatory to ED room 2 with complaints of hives. Stated hives started on wrists 2 weeks ago, only change around that time was picking milkweed pods. Denies testing out any new products. Hives have slowly spread from wrists to other parts of body, with hives shown on wrists, lower back, and lower extremities. Has been taking OTC Benadryl and hydrocortisone cream, benadryl is taken at night and helps with itching but by the time patient wakes up she reports itching has returned and hives have spread. Patient denies any issues breathing or airway issues, lung sounds clear bilaterally. Provider in with patient.

## 2022-11-06 NOTE — ED TRIAGE NOTES
Patient presents to emergency room with c/o hives. Hives on left wrist x 2 weeks. Has been taking benadryl and cortisone cream. Hives spreading. Hives on bilateral arms, legs, and lower back. Intense itching. Pt thinks it was from exposure to milk weed pods.

## 2022-11-06 NOTE — ED NOTES
Patient ambulatory at discharge to home. Patient states she feels much better. Discharge instructions given, patient verbalized understanding. Patient in agreement to return to ED if symptoms worsen or concerns develop.

## 2022-11-06 NOTE — ED PROVIDER NOTES
"  History     Chief Complaint   Patient presents with     Hives     HPI  Shefali Dailey is a 67 year old female who comes to the emergency department complaining of hives and itching which is gotten progressively worse over the last 2 weeks.  The patient states that the only new exposure that she has had is \"picking milk weed pods\".  No new medications no other new exposures.  Patient states the rashes started on her wrists.  She has been taking Benadryl which does relieve her itching and also applying hydrocortisone cream.  She states that the rash seems to be spreading.  Spreading up her arms and is now on her legs and on her low back.  She denies any tightness in her throat or trouble swallowing.  She denies feeling short of breath.  She states that that she has not had hives in the past    Allergies:  Allergies   Allergen Reactions     Benoxinate Hcl Other (See Comments), Itching and Swelling     Burning  Fluress       Fluorescein Sodium Other (See Comments), Itching and Swelling     Burning  Fluress     Fluorescein-Benoxinate Other (See Comments)       Problem List:    Patient Active Problem List    Diagnosis Date Noted     Type 2 diabetes mellitus without complication, without long-term current use of insulin (H) 05/03/2022     Priority: Medium     Spinal stenosis, lumbar region, without neurogenic claudication 09/01/2020     Priority: Medium     Status post total left knee replacement 09/13/2019     Priority: Medium     Left knee pain 09/12/2019     Priority: Medium     Immune to hepatitis B 03/13/2019     Priority: Medium     ACP (advance care planning) 02/06/2013     Priority: Medium     Advance Care Planning 5/3/2017: ACP Review of Chart / Resources Provided:  Reviewed chart for advance care plan.  Shefali Dailey has been provided information and resources to begin or update their advance care plan.  Added by Shania Cobos                Past Medical History:    Past Medical History:   Diagnosis Date "     Malignant neoplasm of breast (female), unspecified 11/28/2005       Past Surgical History:    Past Surgical History:   Procedure Laterality Date     APPENDECTOMY  2004     ARTHROPLASTY KNEE Left 09/12/2019    Procedure: LEFT TOTAL KNEE/NITHIN;  Surgeon: Mat Garner MD;  Location: [facility]     ARTHROSCOPY KNEE Left 04/09/2015    Procedure: ARTHROSCOPY KNEE;  Surgeon: Arden Oreilly MD;  Location: [facility]     AS TOTAL KNEE ARTHROPLASTY Left 09/01/2019     COLONOSCOPY N/A 04/25/2012    Normal. Repeat in 10 years.      DILATION AND CURETTAGE      D&C, multiple     DISSECT LYMPH NODE AXILLA Left 01/01/2005    Performed during bilateral mastectomy.      ENDOSCOPY UPPER, COLONOSCOPY, COMBINED N/A 5/27/2022    Procedure: Colonoscopy with polypectomy and upper endoscopy with biopsy;  Surgeon: Guevara Anderson MD;  Location: [facility]     ESOPHAGOSCOPY, GASTROSCOPY, DUODENOSCOPY (EGD), COMBINED  2012     HYSTERECTOMY TOTAL ABDOMINAL, BILATERAL SALPINGO-OOPHORECTOMY, COMBINED Bilateral 08/26/2004    no cervix, [facility]      MAMMOPLASTY AUGMENTATION BILATERAL Bilateral 06/2006    Bilateral, expansion with saline implants     MASTECTOMY MODIFIED RADICAL, SENTINEL NODE, COMBINED Bilateral 01/01/2005    Bilateral, lymph nodes removed from left side     TONSILLECTOMY  1961      BREAST BIOPSY CORE NEEDLE LEFT Left 2005       Family History:    Family History   Problem Relation Age of Onset     Diabetes Mother      Myocardial Infarction Mother 72        myocardial infarction, cause of death     Other - See Comments Father 63        liver cirrhosis     Cerebrovascular Disease Father 64        cva, cause of death     Breast Cancer Daughter      Other - See Comments Sister         cholelithiasis     Other - See Comments Sister      Diabetes Sister         IDDM     Cancer - colorectal Sister         59     Diabetes Sister         diet controlled     Other - See Comments Sister         billary cirrhosis, stage 4        Social History:  Marital Status:   [4]  Social History     Tobacco Use     Smoking status: Every Day     Packs/day: 0.50     Years: 45.00     Pack years: 22.50     Types: Cigarettes     Start date: 1/1/1976     Smokeless tobacco: Never     Tobacco comments:     Pt denied QP 7/12/2021   Substance Use Topics     Alcohol use: Yes     Alcohol/week: 0.0 standard drinks     Comment: rarely     Drug use: No        Medications:    cyclobenzaprine (FLEXERIL) 10 MG tablet  famotidine (PEPCID) 40 MG tablet  predniSONE (DELTASONE) 20 MG tablet  venlafaxine (EFFEXOR-XR) 75 MG 24 hr capsule          Review of Systems   Constitutional: Negative.    HENT: Negative.    Eyes: Negative.    Respiratory: Negative.    Cardiovascular: Negative.    Gastrointestinal: Negative.    Genitourinary: Negative.    Musculoskeletal: Negative.    Skin: Positive for rash.   Neurological: Negative.    All other systems are reviewed and they are found unremarkable    Physical Exam   BP: 167/94  Pulse: 96  Temp: 97.4  F (36.3  C)  Resp: 18  SpO2: 95 %      Physical Exam 67-year-old female who is awake alert oriented person place and time.  Very pleasant and cooperative with my exam.  No respiratory distress is noted.  HEENT normocephalic extraocular muscles intact and pupils equally round and reactive to light.  Tongue midline palate intact oropharynx clear.  No edema of the tongue or soft palate.  Neck is supple is full range of motion without pain.  Lungs are clear bilaterally.  Heart maintains regular rate and rhythm.  S1 and S2 sounds are appreciated.  Abdomen is soft and nontender.  Extremities a full range of motion 5/5 strength no edema.  Neurologic exam no focal deficit.  Dermatologic exam patient is scattered urticarial lesions consistent with hives on her low back her forearms and her legs.    ED Course              ED Course as of 11/06/22 0914   Sun Nov 06, 2022   0911 Patient was feeling subjectively improved on reassessment.   She will be discharged with prescriptions for Pepcid and prednisone.  I will advise her to continue taking Benadryl every 6 hours.  I will also advise her to be reassessed by her primary care provider this coming week but have also urged her to return to the emergency department for reassessment if her symptoms are not improving or if they seem to be getting worse                         Results for orders placed or performed during the hospital encounter of 11/06/22 (from the past 24 hour(s))   [place] [facility]    Narrative    The following orders were created for panel order [place] [facility].  Procedure                               Abnormality         Status                     ---------                               -----------         ------                     Extra Blue Top Tube[[id number]]                              In process                 Extra Red Top Tube[[id number]]                               In process                 Extra Green Top (Lithium...[[id number]]                      In process                 Extra Green Top (Lithium...[[id number]]                      In process                 Extra Purple Top Tube[[id number]]                            In process                   Please view results for these tests on the individual orders.       Medications   diphenhydrAMINE (BENADRYL) injection 50 mg (50 mg Intravenous Given 11/6/22 0822)   methylPREDNISolone sodium succinate (solu-MEDROL) injection 125 mg (125 mg Intravenous Given 11/6/22 0822)   famotidine (PEPCID) injection 20 mg (20 mg Intravenous Given 11/6/22 0822)       Assessments & Plan (with Medical Decision Making)     I have reviewed the nursing notes.    I have reviewed the findings, diagnosis, plan and need for follow up with the patient.      New Prescriptions    FAMOTIDINE (PEPCID) 40 MG TABLET    Take 1 tablet (40 mg) by mouth daily for 7 days    PREDNISONE (DELTASONE) 20 MG TABLET    Take two tablets (= 40mg) each day for 5 (five) days        Final diagnoses:   Urticaria       11/6/2022   [facility]     Ashkan Harrison,   11/06/22 0915

## 2022-11-13 ENCOUNTER — HOSPITAL ENCOUNTER (EMERGENCY)
Facility: HOSPITAL | Age: 67
Discharge: HOME | End: 2022-11-13
Attending: EMERGENCY MEDICINE | Admitting: EMERGENCY MEDICINE
Payer: COMMERCIAL

## 2022-11-13 VITALS
OXYGEN SATURATION: 96 % | SYSTOLIC BLOOD PRESSURE: 130 MMHG | HEART RATE: 68 BPM | RESPIRATION RATE: 12 BRPM | DIASTOLIC BLOOD PRESSURE: 72 MMHG | TEMPERATURE: 98.4 F

## 2022-11-13 DIAGNOSIS — L50.9 URTICARIA: ICD-10-CM

## 2022-11-13 DIAGNOSIS — R07.9 CHEST PAIN, UNSPECIFIED TYPE: ICD-10-CM

## 2022-11-13 LAB
ALBUMIN SERPL BCG-MCNC: 4 G/DL (ref 3.5–5.2)
ALP SERPL-CCNC: 94 U/L (ref 35–104)
ALT SERPL W P-5'-P-CCNC: 23 U/L (ref 10–35)
ANION GAP SERPL CALCULATED.3IONS-SCNC: 12 MMOL/L (ref 7–15)
AST SERPL W P-5'-P-CCNC: 16 U/L (ref 10–35)
BASOPHILS # BLD AUTO: 0 10E3/UL (ref 0–0.2)
BASOPHILS NFR BLD AUTO: 0 %
BILIRUB DIRECT SERPL-MCNC: <0.2 MG/DL (ref 0–0.3)
BILIRUB SERPL-MCNC: 0.4 MG/DL
BUN SERPL-MCNC: 25.9 MG/DL (ref 8–23)
CALCIUM SERPL-MCNC: 9.1 MG/DL (ref 8.8–10.2)
CHLORIDE SERPL-SCNC: 103 MMOL/L (ref 98–107)
CREAT SERPL-MCNC: 0.82 MG/DL (ref 0.51–0.95)
DEPRECATED HCO3 PLAS-SCNC: 24 MMOL/L (ref 22–29)
EOSINOPHIL # BLD AUTO: 0 10E3/UL (ref 0–0.7)
EOSINOPHIL NFR BLD AUTO: 0 %
ERYTHROCYTE [DISTWIDTH] IN BLOOD BY AUTOMATED COUNT: 13.8 % (ref 10–15)
GFR SERPL CREATININE-BSD FRML MDRD: 79 ML/MIN/1.73M2
GLUCOSE SERPL-MCNC: 136 MG/DL (ref 70–99)
HCT VFR BLD AUTO: 47.1 % (ref 35–47)
HGB BLD-MCNC: 15.7 G/DL (ref 11.7–15.7)
HOLD SPECIMEN: NORMAL
IMM GRANULOCYTES # BLD: 0 10E3/UL
IMM GRANULOCYTES NFR BLD: 0 %
LYMPHOCYTES # BLD AUTO: 2.7 10E3/UL (ref 0.8–5.3)
LYMPHOCYTES NFR BLD AUTO: 20 %
MCH RBC QN AUTO: 30.1 PG (ref 26.5–33)
MCHC RBC AUTO-ENTMCNC: 33.3 G/DL (ref 31.5–36.5)
MCV RBC AUTO: 90 FL (ref 78–100)
MONOCYTES # BLD AUTO: 0.5 10E3/UL (ref 0–1.3)
MONOCYTES NFR BLD AUTO: 4 %
NEUTROPHILS # BLD AUTO: 9.9 10E3/UL (ref 1.6–8.3)
NEUTROPHILS NFR BLD AUTO: 76 %
NRBC # BLD AUTO: 0 10E3/UL
NRBC BLD AUTO-RTO: 0 /100
PLATELET # BLD AUTO: 305 10E3/UL (ref 150–450)
POTASSIUM SERPL-SCNC: 4.6 MMOL/L (ref 3.4–5.3)
PROT SERPL-MCNC: 6.7 G/DL (ref 6.4–8.3)
RBC # BLD AUTO: 5.21 10E6/UL (ref 3.8–5.2)
SODIUM SERPL-SCNC: 139 MMOL/L (ref 136–145)
TROPONIN T SERPL HS-MCNC: <6 NG/L
WBC # BLD AUTO: 13.2 10E3/UL (ref 4–11)

## 2022-11-13 PROCEDURE — 250N000013 HC RX MED GY IP 250 OP 250 PS 637: Performed by: EMERGENCY MEDICINE

## 2022-11-13 PROCEDURE — 99284 EMERGENCY DEPT VISIT MOD MDM: CPT | Mod: 25

## 2022-11-13 PROCEDURE — 36415 COLL VENOUS BLD VENIPUNCTURE: CPT | Performed by: EMERGENCY MEDICINE

## 2022-11-13 PROCEDURE — 99284 EMERGENCY DEPT VISIT MOD MDM: CPT | Performed by: EMERGENCY MEDICINE

## 2022-11-13 PROCEDURE — 93005 ELECTROCARDIOGRAM TRACING: CPT

## 2022-11-13 PROCEDURE — 250N000011 HC RX IP 250 OP 636: Performed by: EMERGENCY MEDICINE

## 2022-11-13 PROCEDURE — 84484 ASSAY OF TROPONIN QUANT: CPT | Performed by: EMERGENCY MEDICINE

## 2022-11-13 PROCEDURE — 82248 BILIRUBIN DIRECT: CPT | Performed by: EMERGENCY MEDICINE

## 2022-11-13 PROCEDURE — 85025 COMPLETE CBC W/AUTO DIFF WBC: CPT | Performed by: EMERGENCY MEDICINE

## 2022-11-13 PROCEDURE — 96374 THER/PROPH/DIAG INJ IV PUSH: CPT

## 2022-11-13 PROCEDURE — 96375 TX/PRO/DX INJ NEW DRUG ADDON: CPT

## 2022-11-13 PROCEDURE — 80053 COMPREHEN METABOLIC PANEL: CPT | Performed by: EMERGENCY MEDICINE

## 2022-11-13 RX ORDER — HYDROXYZINE HYDROCHLORIDE 25 MG/1
50 TABLET, FILM COATED ORAL ONCE
Status: COMPLETED | OUTPATIENT
Start: 2022-11-13 | End: 2022-11-13

## 2022-11-13 RX ORDER — FAMOTIDINE 20 MG/1
20 TABLET, FILM COATED ORAL 2 TIMES DAILY
Qty: 20 TABLET | Refills: 0 | Status: SHIPPED | OUTPATIENT
Start: 2022-11-13 | End: 2023-01-03

## 2022-11-13 RX ORDER — METHYLPREDNISOLONE SODIUM SUCCINATE 125 MG/2ML
80 INJECTION, POWDER, LYOPHILIZED, FOR SOLUTION INTRAMUSCULAR; INTRAVENOUS ONCE
Status: COMPLETED | OUTPATIENT
Start: 2022-11-13 | End: 2022-11-13

## 2022-11-13 RX ORDER — DIPHENHYDRAMINE HYDROCHLORIDE 50 MG/ML
25 INJECTION INTRAMUSCULAR; INTRAVENOUS ONCE
Status: COMPLETED | OUTPATIENT
Start: 2022-11-13 | End: 2022-11-13

## 2022-11-13 RX ORDER — PREDNISONE 10 MG/1
TABLET ORAL
Qty: 21 TABLET | Refills: 0 | Status: SHIPPED | OUTPATIENT
Start: 2022-11-13 | End: 2022-11-22

## 2022-11-13 RX ORDER — HYDROXYZINE HYDROCHLORIDE 25 MG/1
25 TABLET, FILM COATED ORAL 3 TIMES DAILY PRN
Qty: 20 TABLET | Refills: 0 | Status: SHIPPED | OUTPATIENT
Start: 2022-11-13 | End: 2022-11-20

## 2022-11-13 RX ADMIN — HYDROXYZINE HYDROCHLORIDE 50 MG: 25 TABLET, FILM COATED ORAL at 08:01

## 2022-11-13 RX ADMIN — METHYLPREDNISOLONE SODIUM SUCCINATE 81.25 MG: 125 INJECTION, POWDER, FOR SOLUTION INTRAMUSCULAR; INTRAVENOUS at 08:00

## 2022-11-13 RX ADMIN — DIPHENHYDRAMINE HYDROCHLORIDE 25 MG: 50 INJECTION, SOLUTION INTRAMUSCULAR; INTRAVENOUS at 08:00

## 2022-11-13 RX ADMIN — FAMOTIDINE 20 MG: 10 INJECTION, SOLUTION INTRAVENOUS at 08:00

## 2022-11-13 NOTE — ED NOTES
"Patient presents to ED Rm 5 with a c/o full body hives and substernal chest pain.  Chest pain is currently a 8 out of 10 stabbing pain that radiates to her back. Nothing makes it better or worse.   She states she \"has had this pain about 6 months ago and believes it to be related to her gallbladder\". No cardiac Hx.  Hives started a few weeks ago after she was picking milkweed pods.   Hives spread down her left arm, all throughout her torso/waist line around her lower back. See media.  "

## 2022-11-13 NOTE — ED PROVIDER NOTES
EMERGENCY DEPARTMENT ENCOUNTER      NAME: Shefali Dailey  AGE: 67 year old female  YOB: 1955  MRN: [id number]  EVALUATION DATE & TIME: 2022  7:31 AM    PCP: Yosef Gonzales    ED PROVIDER: Leon Rose M.D.      Chief Complaint   Patient presents with     Hives     Chest Pain       FINAL IMPRESSION:  1. Urticaria    2. Chest pain, unspecified type        ED COURSE & MEDICAL DECISION MAKIN year old female presents to the Emergency Department for evaluation of itching rash and chest pain.  She was diagnosed with urticaria about a week ago, it sounds like this is related to an environmental exposure to milk weed pods.  It sounds like she got some temporary relief with her steroid burst prescribed last week but symptoms have recurred and continued to worsen.  Does not have any other clear environmental exposures to account for the symptoms.  She has a urticarial rash which is confluent along her lower abdominal wall and flank and scattered across her extremities.  This does appear consistent with urticaria and not any alternative like lyme disease, eczema, psoriasis, etc.  Certainly no evidence of any acute life-threatening dermatological syndrome like England-Ryley syndrome.  She has secondary concern of chest discomfort, she relates this probably to acid symptoms which certainly could be the case in the setting of continued urticarial rash and systemic allergy.  She was feeling much improved here after Solu-Medrol, Benadryl, hydroxyzine, and Pepcid.  She did have a lab evaluation including troponin which was less than 6 ruling her out for ACS.  Her EKG was normal.  She does have a mild leukocytosis, probably reactive from her recent steroid use.  No other systemic symptoms of infection.  Patient was feeling much better after interventions here and I am going to prescribe her a slightly longer but less aggressive steroid taper, continue Pepcid, and have her follow-up as soon as  possible to continue an outpatient evaluation for this persistent urticarial rash.  Patient was discharged in stable condition.    At the conclusion of the encounter I discussed the results of all of the tests and the disposition. The questions were answered. The patient or family acknowledged understanding and was agreeable with the care plan.       MEDICATIONS GIVEN IN THE EMERGENCY:  Medications   famotidine (PEPCID) injection 20 mg (20 mg Intravenous Given 11/13/22 0800)   methylPREDNISolone sodium succinate (solu-MEDROL) injection 81.25 mg (81.25 mg Intravenous Given 11/13/22 0800)   diphenhydrAMINE (BENADRYL) injection 25 mg (25 mg Intravenous Given 11/13/22 0800)   hydrOXYzine (ATARAX) tablet 50 mg (50 mg Oral Given 11/13/22 0801)       NEW PRESCRIPTIONS STARTED AT TODAY'S ER VISIT  New Prescriptions    FAMOTIDINE (PEPCID) 20 MG TABLET    Take 1 tablet (20 mg) by mouth 2 times daily    HYDROXYZINE (ATARAX) 25 MG TABLET    Take 1 tablet (25 mg) by mouth 3 times daily as needed for itching    PREDNISONE (DELTASONE) 10 MG TABLET    Take 4 tablets (40 mg) by mouth daily for 3 days, THEN 2 tablets (20 mg) daily for 3 days, THEN 1 tablet (10 mg) daily for 3 days.          =================================================================    HPI    Patient information was obtained from: Patient      Shefali Dailey is a 67 year old female who presents to this ED today for evaluation of rash and chest pain.  She was evaluated on 11/6 for an urticarial rash.  She says this started a couple of weeks ago after she was picking milk weed seeds.  She has been treated with a 5-day prednisone burst along with 7 days of Pepcid and has been using over-the-counter Benadryl frequently at home, last dose was last evening.  Rash initially seem to be getting better however at the end of her prednisone course it recurred, is quite itchy, involving primarily her lower abdominal wall and bilateral flanks but also scattered across her  bilateral extremities.  No mucosal lesions are reported.  She says she intermittently feels short of breath.  On review of systems she also reports some intermittent chest pain, this feels intermittently like a stabbing and pressure, it was especially severe this morning as she was making her way to the emergency department.  She reports intermittent shortness of breath.  Reports some nausea and GI upset, no vomiting.  No diarrhea.  No fever or cough.      REVIEW OF SYSTEMS   All systems reviewed and negative except as noted in HPI.    PAST MEDICAL HISTORY:  Past Medical History:   Diagnosis Date     Malignant neoplasm of breast (female), unspecified 11/28/2005       PAST SURGICAL HISTORY:  Past Surgical History:   Procedure Laterality Date     APPENDECTOMY  2004     ARTHROPLASTY KNEE Left 09/12/2019    Procedure: LEFT TOTAL KNEE/NITHIN;  Surgeon: Mat Garner MD;  Location: [facility] OR     ARTHROSCOPY KNEE Left 04/09/2015    Procedure: ARTHROSCOPY KNEE;  Surgeon: Arden Oreilly MD;  Location: [facility] OR     AS TOTAL KNEE ARTHROPLASTY Left 09/01/2019     COLONOSCOPY N/A 04/25/2012    Normal. Repeat in 10 years.      DILATION AND CURETTAGE      D&C, multiple     DISSECT LYMPH NODE AXILLA Left 01/01/2005    Performed during bilateral mastectomy.      ENDOSCOPY UPPER, COLONOSCOPY, COMBINED N/A 5/27/2022    Procedure: Colonoscopy with polypectomy and upper endoscopy with biopsy;  Surgeon: Guevara Anderson MD;  Location: [facility] OR     ESOPHAGOSCOPY, GASTROSCOPY, DUODENOSCOPY (EGD), COMBINED  2012     HYSTERECTOMY TOTAL ABDOMINAL, BILATERAL SALPINGO-OOPHORECTOMY, COMBINED Bilateral 08/26/2004    no cervix, [facility]      MAMMOPLASTY AUGMENTATION BILATERAL Bilateral 06/2006    Bilateral, expansion with saline implants     MASTECTOMY MODIFIED RADICAL, SENTINEL NODE, COMBINED Bilateral 01/01/2005    Bilateral, lymph nodes removed from left side     TONSILLECTOMY  1961      BREAST BIOPSY CORE NEEDLE LEFT Left 2005            CURRENT MEDICATIONS:    Current Facility-Administered Medications   Medication     famotidine (PEPCID) injection 20 mg     Current Outpatient Medications   Medication     famotidine (PEPCID) 20 MG tablet     hydrOXYzine (ATARAX) 25 MG tablet     predniSONE (DELTASONE) 10 MG tablet     cyclobenzaprine (FLEXERIL) 10 MG tablet     venlafaxine (EFFEXOR-XR) 75 MG 24 hr capsule         ALLERGIES:  Allergies   Allergen Reactions     Benoxinate Hcl Other (See Comments), Itching and Swelling     Burning  Fluress       Fluorescein Sodium Other (See Comments), Itching and Swelling     Burning  Fluress     Fluorescein-Benoxinate Other (See Comments)       FAMILY HISTORY:  Family History   Problem Relation Age of Onset     Diabetes Mother      Myocardial Infarction Mother 72        myocardial infarction, cause of death     Other - See Comments Father 63        liver cirrhosis     Cerebrovascular Disease Father 64        cva, cause of death     Breast Cancer Daughter      Other - See Comments Sister         cholelithiasis     Other - See Comments Sister      Diabetes Sister         IDDM     Cancer - colorectal Sister         59     Diabetes Sister         diet controlled     Other - See Comments Sister         billary cirrhosis, stage 4       SOCIAL HISTORY:   Social History     Socioeconomic History     Marital status:    Tobacco Use     Smoking status: Every Day     Packs/day: 0.50     Years: 45.00     Pack years: 22.50     Types: Cigarettes     Start date: 1/1/1976     Smokeless tobacco: Never     Tobacco comments:     Pt denied QP 7/12/2021   Substance and Sexual Activity     Alcohol use: Yes     Alcohol/week: 0.0 standard drinks     Comment: rarely     Drug use: No   Other Topics Concern     Blood Transfusions Yes     Caffeine Concern Yes     Comment: coffee 6 cups daily     Parent/sibling w/ CABG, MI or angioplasty before 65F 55M? No       VITALS:  /72   Pulse 68   Temp 98.4  F (36.9  C) (Tympanic)    Resp 12   SpO2 96%     PHYSICAL EXAM    Constitutional: Well developed, Well nourished, NAD.  HENT: Normocephalic, Atraumatic. Neck Supple.  Eyes: EOMI, Conjunctiva normal.  Respiratory: Breathing comfortably on room air. Speaks full sentences easily. Lungs clear to ascultation.  Cardiovascular: Normal heart rate, Regular rhythm. No peripheral edema.  Abdomen: Soft, nontender  Musculoskeletal: Good range of motion in all major joints. No major deformities noted.  Integument: There is an urticarial rash involving the trunk and bilateral upper extremities.  There are areas of confluence particularly on the lower abdominal wall and bilateral flanks.  Neurologic: Alert & awake, Normal motor function, Normal sensory function, No focal deficits noted.   Psychiatric: Cooperative. Affect appropriate.     LAB:  All pertinent labs reviewed and interpreted.  Labs Ordered and Resulted from Time of ED Arrival to Time of ED Departure   BASIC METABOLIC PANEL - Abnormal       Result Value    Sodium 139      Potassium 4.6      Chloride 103      Carbon Dioxide (CO2) 24      Anion Gap 12      Urea Nitrogen 25.9 (*)     Creatinine 0.82      Calcium 9.1      Glucose 136 (*)     GFR Estimate 79     CBC WITH PLATELETS AND DIFFERENTIAL - Abnormal    WBC Count 13.2 (*)     RBC Count 5.21 (*)     Hemoglobin 15.7      Hematocrit 47.1 (*)     MCV 90      MCH 30.1      MCHC 33.3      RDW 13.8      Platelet Count 305      % Neutrophils 76      % Lymphocytes 20      % Monocytes 4      % Eosinophils 0      % Basophils 0      % Immature Granulocytes 0      NRBCs per 100 WBC 0      Absolute Neutrophils 9.9 (*)     Absolute Lymphocytes 2.7      Absolute Monocytes 0.5      Absolute Eosinophils 0.0      Absolute Basophils 0.0      Absolute Immature Granulocytes 0.0      Absolute NRBCs 0.0     TROPONIN T, HIGH SENSITIVITY - Normal    Troponin T, High Sensitivity <6     HEPATIC FUNCTION PANEL - Normal    Protein Total 6.7      Albumin 4.0       Bilirubin Total 0.4      Alkaline Phosphatase 94      AST 16      ALT 23      Bilirubin Direct <0.20         RADIOLOGY:  Reviewed all pertinent imaging. Please see official radiology report.  No orders to display       EKG:    Performed at: 736    Impression: Sinus rhythm, normal ECG    Rate: 75  Rhythm: Sinus  Axis: Normal  GA Interval: 124  QRS Interval: 74  QTc Interval: 395  ST Changes: No ST changes  Comparison: No significant change from 9/3/2019    I have independently reviewed and interpreted the EKG(s) documented above.        Leon Rose M.D.  Emergency Medicine  [facility]  [address] [place] [id number]  [phone]  Dept: [phone]       Leon Rose MD  11/13/22 4484

## 2022-11-13 NOTE — ED TRIAGE NOTES
"Ambulated into triage with c/o hives for 3 weeks. Reports she has been seen for this a few times, is on prednisone and Benadryl, but the hives keep getting worse and itch very badly. During triage pain assessment patient rated pain 8/10. When asked where pain was states \"my chest.\" When asked about chest pain states \"well I'm not sweaty or anything, these hives are so bad.\" Patient then grabbed chest and and leaned back in chair and states \"it hurts so bad, it's stabbing.\" Brought directly to ER room 5.     "

## 2022-11-13 NOTE — DISCHARGE INSTRUCTIONS
You were seen in the emergency department at [facility] for rash indigestion and chest pain.  Your evaluation appears stable and this included EKG and blood testing for your heart.  We think your symptoms are consistent with an ongoing systemic allergic reaction.  We are going to start you on a slightly longer steroid taper, please take this as prescribed.  We would also like you to keep using Benadryl every 6 hours and we are going to prescribe you hydroxyzine which is another useful medication for itching.  We would also like you to be using Pepcid twice a day to help with indigestion and gut allergic symptoms.  Please make sure you are sticking to a bland diet and drinking plenty of liquids.  Please notify your clinic about these ongoing symptoms tomorrow, and set up follow-up as soon as possible to continue outpatient evaluation for any ongoing symptoms

## 2022-11-13 NOTE — ED NOTES
"Patient reports that she is \"feeling much better and is finally able to relax without all the itching.\"  Discharge instructions gone over with patient, Rx given, no questions at this time and verbalized understanding of instructions.  Encouraged to return to ED if S/S reoccur or worsen.  Patient ambulated out of ED.   "

## 2022-11-23 ENCOUNTER — TELEPHONE (OUTPATIENT)
Dept: FAMILY MEDICINE | Facility: OTHER | Age: 67
End: 2022-11-23

## 2022-11-23 DIAGNOSIS — L50.9 HIVES: Primary | ICD-10-CM

## 2022-11-23 NOTE — TELEPHONE ENCOUNTER
Suggest zyrtec daily, hydrocortisone cream to itchy skin, ENT eval for search for allergic trigger, and visit with me when she is able.  Thanks.  Yosef Gonzales MD

## 2022-11-23 NOTE — TELEPHONE ENCOUNTER
Pt has had hives all over her body x 1 months.  She has been to the ER x 2 with no change.  Prednisone is the only medication that has helped.  Pt has [employer] insurance and cannot be seen.  Should she continue oral benadryl and hydrocortisone cream?  Please advise.

## 2022-12-01 ENCOUNTER — TELEPHONE (OUTPATIENT)
Dept: OTOLARYNGOLOGY | Facility: OTHER | Age: 67
End: 2022-12-01

## 2022-12-01 NOTE — TELEPHONE ENCOUNTER
Spoke to Shefali. She currently has [employer]. She will call us back January 1, 2023 or later , as her insurance will be different then. She would like to schedule an allergy consult.

## 2022-12-22 NOTE — PROGRESS NOTES
"  Assessment & Plan     Type 2 diabetes mellitus without complication, without long-term current use of insulin (H)  Doing well.  Update labs and follow.  Had an insurance issue which is now resolved.    - Albumin Random Urine Quantitative with Creat Ratio; Future  - Basic metabolic panel; Future  - Hemoglobin A1c; Future  - MI FOOT EXAM NO CHARGE  - TSH with free T4 reflex; Future    Allergic reaction, subsequent encounter  Hives.  Now on zyrtec daily.  Asking ENT to see and recommend some testing.  We have to find an agent.    - Adult ENT  Referral; Future  - cetirizine (ZYRTEC) 10 MG tablet; Take 1 tablet (10 mg) by mouth daily             Nicotine/Tobacco Cessation:  She reports that she has been smoking cigarettes. She started smoking about 47 years ago. She has a 22.50 pack-year smoking history. She has never used smokeless tobacco.  Nicotine/Tobacco Cessation Plan:   Information offered: Patient not interested at this time      BMI:   Estimated body mass index is 32.86 kg/m  as calculated from the following:    Height as of 5/27/22: 1.778 m (5' 10\").    Weight as of this encounter: 103.9 kg (229 lb).           No follow-ups on file.    Yosef Gonzales MD  [facility]   Shefali is a 67 year old, presenting for the following health issues:  Diabetes      HPI     Diabetes Follow-up      How often are you checking your blood sugar? Not at all    What concerns do you have today about your diabetes? None     Do you have any of these symptoms? (Select all that apply)  No numbness or tingling in feet.  No redness, sores or blisters on feet.  No complaints of excessive thirst.  No reports of blurry vision.  No significant changes to weight.    Have you had a diabetic eye exam in the last 12 months? No        BP Readings from Last 2 Encounters:   01/03/23 128/64   11/13/22 130/72     Hemoglobin A1C (%)   Date Value   04/28/2022 6.2 (H)   02/23/2021 6.1 (H)     LDL " Cholesterol Calculated (mg/dL)   Date Value   04/28/2022 135 (H)   02/23/2021 130 (H)   08/31/2020 150 (H)               Hypertension Follow-up      Do you check your blood pressure regularly outside of the clinic? No     Are you following a low salt diet? No    Are your blood pressures ever more than 140 on the top number (systolic) OR more   than 90 on the bottom number (diastolic), for example 140/90? NA    Diabetic foot exam   1. foot deformity   No  2. Current or previous foot ulceration     No  3. Current or previous pre-ulcerative calluses     Yes  4. previous partial amputation of one or both feet or complete amputation of one foot     No  5. peripheral neuropathy with evidence of callus formation     No  6. poor circulation     No  Approval given for 3 pairs of diabetic shoes and inserts if patient desires.        Review of Systems   Constitutional, HEENT, cardiovascular, pulmonary, gi and gu systems are negative, except as otherwise noted.      Objective    /64   Pulse 82   Temp 97.2  F (36.2  C) (Tympanic)   Wt 103.9 kg (229 lb)   SpO2 95%   BMI 32.86 kg/m    Body mass index is 32.86 kg/m .  Physical Exam   GENERAL: healthy, alert and no distress  NECK: no adenopathy, no asymmetry, masses, or scars and thyroid normal to palpation  RESP: lungs clear to auscultation - no rales, rhonchi or wheezes  CV: regular rate and rhythm, normal S1 S2, no S3 or S4, no murmur, click or rub, no peripheral edema and peripheral pulses strong  ABDOMEN: soft, nontender, no hepatosplenomegaly, no masses and bowel sounds normal  MS: no gross musculoskeletal defects noted, no edema

## 2023-01-03 ENCOUNTER — OFFICE VISIT (OUTPATIENT)
Dept: FAMILY MEDICINE | Facility: OTHER | Age: 68
End: 2023-01-03
Attending: FAMILY MEDICINE
Payer: COMMERCIAL

## 2023-01-03 VITALS
DIASTOLIC BLOOD PRESSURE: 64 MMHG | OXYGEN SATURATION: 95 % | BODY MASS INDEX: 32.86 KG/M2 | HEART RATE: 82 BPM | WEIGHT: 229 LBS | SYSTOLIC BLOOD PRESSURE: 128 MMHG | TEMPERATURE: 97.2 F

## 2023-01-03 DIAGNOSIS — E11.9 TYPE 2 DIABETES MELLITUS WITHOUT COMPLICATION, WITHOUT LONG-TERM CURRENT USE OF INSULIN (H): Primary | ICD-10-CM

## 2023-01-03 DIAGNOSIS — F43.0 ACUTE REACTION TO STRESS: ICD-10-CM

## 2023-01-03 DIAGNOSIS — T78.40XD ALLERGIC REACTION, SUBSEQUENT ENCOUNTER: ICD-10-CM

## 2023-01-03 LAB
ANION GAP SERPL CALCULATED.3IONS-SCNC: 13 MMOL/L (ref 7–15)
BUN SERPL-MCNC: 18.3 MG/DL (ref 8–23)
CALCIUM SERPL-MCNC: 9.4 MG/DL (ref 8.8–10.2)
CHLORIDE SERPL-SCNC: 101 MMOL/L (ref 98–107)
CREAT SERPL-MCNC: 0.74 MG/DL (ref 0.51–0.95)
CREAT UR-MCNC: 97.4 MG/DL
DEPRECATED HCO3 PLAS-SCNC: 26 MMOL/L (ref 22–29)
EST. AVERAGE GLUCOSE BLD GHB EST-MCNC: 126 MG/DL
GFR SERPL CREATININE-BSD FRML MDRD: 89 ML/MIN/1.73M2
GLUCOSE SERPL-MCNC: 117 MG/DL (ref 70–99)
HBA1C MFR BLD: 6 %
MICROALBUMIN UR-MCNC: <12 MG/L
MICROALBUMIN/CREAT UR: NORMAL MG/G{CREAT}
POTASSIUM SERPL-SCNC: 4.1 MMOL/L (ref 3.4–5.3)
SODIUM SERPL-SCNC: 140 MMOL/L (ref 136–145)
TSH SERPL DL<=0.005 MIU/L-ACNC: 1.5 UIU/ML (ref 0.3–4.2)

## 2023-01-03 PROCEDURE — G0463 HOSPITAL OUTPT CLINIC VISIT: HCPCS

## 2023-01-03 PROCEDURE — 99214 OFFICE O/P EST MOD 30 MIN: CPT | Performed by: FAMILY MEDICINE

## 2023-01-03 PROCEDURE — 82570 ASSAY OF URINE CREATININE: CPT | Mod: ZL | Performed by: FAMILY MEDICINE

## 2023-01-03 PROCEDURE — 84443 ASSAY THYROID STIM HORMONE: CPT | Mod: ZL | Performed by: FAMILY MEDICINE

## 2023-01-03 PROCEDURE — 83036 HEMOGLOBIN GLYCOSYLATED A1C: CPT | Mod: ZL | Performed by: FAMILY MEDICINE

## 2023-01-03 PROCEDURE — 80048 BASIC METABOLIC PNL TOTAL CA: CPT | Mod: ZL | Performed by: FAMILY MEDICINE

## 2023-01-03 PROCEDURE — 36415 COLL VENOUS BLD VENIPUNCTURE: CPT | Mod: ZL | Performed by: FAMILY MEDICINE

## 2023-01-03 RX ORDER — CETIRIZINE HYDROCHLORIDE 10 MG/1
10 TABLET ORAL DAILY
COMMUNITY
End: 2023-01-24

## 2023-01-03 RX ORDER — VENLAFAXINE HYDROCHLORIDE 75 MG/1
75 CAPSULE, EXTENDED RELEASE ORAL DAILY
Qty: 90 CAPSULE | Refills: 3
Start: 2023-01-03 | End: 2023-06-29

## 2023-01-03 RX ORDER — CETIRIZINE HYDROCHLORIDE 10 MG/1
10 TABLET ORAL DAILY
Qty: 90 TABLET | Refills: 3 | Status: SHIPPED | OUTPATIENT
Start: 2023-01-03 | End: 2023-12-01

## 2023-01-03 ASSESSMENT — PAIN SCALES - GENERAL: PAINLEVEL: MODERATE PAIN (4)

## 2023-01-24 ENCOUNTER — OFFICE VISIT (OUTPATIENT)
Dept: OTOLARYNGOLOGY | Facility: OTHER | Age: 68
End: 2023-01-24
Attending: PHYSICIAN ASSISTANT
Payer: COMMERCIAL

## 2023-01-24 VITALS
BODY MASS INDEX: 32.06 KG/M2 | WEIGHT: 229 LBS | HEART RATE: 83 BPM | OXYGEN SATURATION: 95 % | HEIGHT: 71 IN | TEMPERATURE: 97.1 F | SYSTOLIC BLOOD PRESSURE: 134 MMHG | DIASTOLIC BLOOD PRESSURE: 76 MMHG

## 2023-01-24 DIAGNOSIS — L50.9 URTICARIA: Primary | ICD-10-CM

## 2023-01-24 DIAGNOSIS — T78.40XD ALLERGIC REACTION, SUBSEQUENT ENCOUNTER: ICD-10-CM

## 2023-01-24 PROCEDURE — G0463 HOSPITAL OUTPT CLINIC VISIT: HCPCS

## 2023-01-24 PROCEDURE — 99214 OFFICE O/P EST MOD 30 MIN: CPT | Performed by: PHYSICIAN ASSISTANT

## 2023-01-24 ASSESSMENT — PAIN SCALES - GENERAL: PAINLEVEL: NO PAIN (0)

## 2023-01-24 NOTE — LETTER
2023         RE: Shefali Dailey  [address]  [place] [address]        Dear Colleague,    Thank you for referring your patient, Shefali Dailey, to the [facility] [place]. Please see a copy of my visit note below.    Otolaryngology Consultation    Patient: Shefali Dailey  : 1955    Patient presents with:  Consult: Referred by Dr. Gonzales for allergic reaction      HPI:  Shefali Dailey is a 67 year old female seen today for allergy evaluation.  Patient was seen in ED on 22 and 22 for urticaria. 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 At that visit, she was started on Pepcid, Prednisone. She returned on 22 due to urticaria, chest discomfort. Exam was stable and started on Vistaril, extended Prednisone, Pepcid.     Today, she reports concerns for possible allergies. Symptoms began this fall and caused some hives/ itching. Started meds then noted significant progression which prompted her to return to ED. She was given Solumedrol, Prednisone, pepcid.   Symptoms slowly improved. Since this last occurrence she has been on daily Zyrtec with fair control.     No changes in related to products.   No chemical exposure  No oral symptoms.   No prior concerns.   No prior allergy testing.   Denies nasal congestion, rhinitis, sneezing.     Resides in a house with a partial finished basement  There is no water or mold  Carpet in bedroom - No  Heat in home- Steam.   Animals- No.   Family hx of allergies- daughter has some allergy concerns related to possible treatment.   No prior allergy testing.   Past trials of medications- Zyrtec.   Benadryl, hydrocortisone      Current Outpatient Rx   Medication Sig Dispense Refill     cetirizine (ZYRTEC) 10 MG tablet Take 10 mg by mouth daily       cetirizine (ZYRTEC) 10 MG tablet Take 1 tablet (10 mg) by mouth daily 90 tablet 3     cyclobenzaprine (FLEXERIL) 10 MG tablet Take 1 tablet (10 mg) by mouth 3 times daily as needed  for muscle spasms 30 tablet 3     STATIN NOT PRESCRIBED (INTENTIONAL) Please choose reason not prescribed from choices below.       venlafaxine (EFFEXOR XR) 75 MG 24 hr capsule Take 1 capsule (75 mg) by mouth daily 90 capsule 3       Allergies: Benoxinate hcl, Fluorescein sodium, and Fluorescein-benoxinate     Past Medical History:   Diagnosis Date     Malignant neoplasm of breast (female), unspecified 11/28/2005       Past Surgical History:   Procedure Laterality Date     APPENDECTOMY  2004     ARTHROPLASTY KNEE Left 09/12/2019    Procedure: LEFT TOTAL KNEE/NITHIN;  Surgeon: Mat Garner MD;  Location: [facility]     ARTHROSCOPY KNEE Left 04/09/2015    Procedure: ARTHROSCOPY KNEE;  Surgeon: Arden Oreilly MD;  Location: [facility]     AS TOTAL KNEE ARTHROPLASTY Left 09/01/2019     COLONOSCOPY N/A 04/25/2012    Normal. Repeat in 10 years.      DILATION AND CURETTAGE      D&C, multiple     DISSECT LYMPH NODE AXILLA Left 01/01/2005    Performed during bilateral mastectomy.      ENDOSCOPY UPPER, COLONOSCOPY, COMBINED N/A 5/27/2022    Procedure: Colonoscopy with polypectomy and upper endoscopy with biopsy;  Surgeon: Guevara Anderson MD;  Location: [facility] OR     ESOPHAGOSCOPY, GASTROSCOPY, DUODENOSCOPY (EGD), COMBINED  2012     HYSTERECTOMY TOTAL ABDOMINAL, BILATERAL SALPINGO-OOPHORECTOMY, COMBINED Bilateral 08/26/2004    no cervix, [facility]      MAMMOPLASTY AUGMENTATION BILATERAL Bilateral 06/2006    Bilateral, expansion with saline implants     MASTECTOMY MODIFIED RADICAL, SENTINEL NODE, COMBINED Bilateral 01/01/2005    Bilateral, lymph nodes removed from left side     TONSILLECTOMY  1961      BREAST BIOPSY CORE NEEDLE LEFT Left 2005       ENT family history reviewed    Social History     Tobacco Use     Smoking status: Every Day     Packs/day: 0.50     Years: 45.00     Pack years: 22.50     Types: Cigarettes     Start date: 1/1/1976     Smokeless tobacco: Never     Tobacco comments:     Pt denied QP  "7/12/2021   Substance Use Topics     Alcohol use: Yes     Alcohol/week: 0.0 standard drinks     Comment: rarely     Drug use: No       Review of Systems  ROS: 10 point ROS neg other than the symptoms noted above in the HPI     Physical Exam  /76 (Cuff Size: Adult Large)   Pulse 83   Temp 97.1  F (36.2  C) (Tympanic)   Ht 1.791 m (5' 10.5\")   Wt 103.9 kg (229 lb)   SpO2 95%   BMI 32.39 kg/m      General - The patient is well nourished and well developed, and appears to have good nutritional status.  Alert and oriented to person and place, answers questions and cooperates with examination appropriately.   Head and Face - Normocephalic and atraumatic, with no gross asymmetry noted.  The facial nerve is intact, with strong symmetric movements.  Voice and Breathing - The patient was breathing comfortably without the use of accessory muscles. There was no wheezing, stridor, or stertor.  The patients voice was clear and strong, and had appropriate pitch and quality.  Ears -The external auditory canals are patent, the tympanic membranes are intact without effusion, retraction or mass.  Bony landmarks are intact.  Eyes - Extraocular movements intact, and the pupils were reactive to light.  Sclera were not icteric or injected, conjunctiva were pink and moist.  Mouth - Examination of the oral cavity showed pink, healthy oral mucosa. No lesions or ulcerations noted.  The tongue was mobile and midline, and the dentition were in good condition.    Throat - The walls of the oropharynx were smooth, pink, moist, symmetric, and had no lesions or ulcerations.  The tonsillar pillars and soft palate were symmetric.  The uvula was midline on elevation.    Neck - Normal midline excursion of the laryngotracheal complex during swallowing.  Full range of motion on passive movement.  Palpation of the occipital, submental, submandibular, internal jugular chain, and supraclavicular nodes did not demonstrate any abnormal lymph nodes " or masses.  Palpation of the thyroid was soft and smooth, with no nodules or goiter appreciated.  The trachea was mobile and midline.  Nose - External contour is symmetric, no gross deflection or scars.  Nasal mucosa is pink and moist with no abnormal mucus.  The septum was intact and the turbinates are enlarged.  No polyps, masses, or purulence noted on examination.  Skin- Normal. No active  Lesions.     Impression and Plan- Shefali Dailey is a 67 year old female with:    ICD-10-CM    1. Urticaria  L50.9       2. Allergic reaction, subsequent encounter  T78.40XD Adult ENT  Referral            Return for MQT  Discussed daily use of Zyrtec and holding prior to MQT.    reviewed food testing however no oral complaints nor noted exposures.     Take your antihistamine daily (cetirizine, loratadine, fexofenadine, or similar) or twice daily if recommended.    Allergen avoidance measures were discussed and are important in preventing symptoms from occurring or worsening.    Indications for allergy testing include:   1) Confirm suspicion of allergic rhinitis due to inhalant allergies  2) Identify the offending allergen to determine specific mode of treatment  3) In the case of chronic rhinosinusitis: when symptoms are not controlled by avoidance and pharmacotherapy  4) In the Asthma patient when exacerbations may be due to perennial allergen exposure  5) Suspect food allergy  6) Otitis Media, chronic rhinitis, atopic dermatitis, Meniere disease, headache, pharyngitis or eye symptoms    Due to the findings above,  modified quantitative testing (MQT) will be performed.  Signed consent was obtained, and the risks of immunotherapy were discussed, including the potential for anaphylaxis.    If immunotherapy (IT) is recommended, there is continued risk of anaphylaxis.   Anaphylaxis can cause death. The patient will need to be monitored for 30 minutes post injection.  They must present their epinephrine pen prior to  injection.  Subcutaneous as well as sublingual immunotherapy (SLIT) were discussed as potential treatment options.  The patient was told SLIT is not approved by the FDA and is cash pay.  The general time frame of immunotherapy was discussed (generally 3-5 years, sometimes longer), and the basic immunology behind IT was discussed.            Lily Berger PA-C  [facility], [place]            Again, thank you for allowing me to participate in the care of your patient.        Sincerely,        Lily Berger PA-C

## 2023-01-24 NOTE — PROGRESS NOTES
Otolaryngology Consultation    Patient: Shefali Dailey  : 1955    Patient presents with:  Consult: Referred by Dr. Gonzales for allergic reaction      HPI:  Shefali Dailey is a 67 year old female seen today for allergy evaluation.  Patient was seen in ED on 22 and 22 for urticaria. 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 At that visit, she was started on Pepcid, Prednisone. She returned on 22 due to urticaria, chest discomfort. Exam was stable and started on Vistaril, extended Prednisone, Pepcid.     Today, she reports concerns for possible allergies. Symptoms began this fall and caused some hives/ itching. Started meds then noted significant progression which prompted her to return to ED. She was given Solumedrol, Prednisone, pepcid.   Symptoms slowly improved. Since this last occurrence she has been on daily Zyrtec with fair control.     No changes in related to products.   No chemical exposure  No oral symptoms.   No prior concerns.   No prior allergy testing.   Denies nasal congestion, rhinitis, sneezing.     Resides in a house with a partial finished basement  There is no water or mold  Carpet in bedroom - No  Heat in home- Steam.   Animals- No.   Family hx of allergies- daughter has some allergy concerns related to possible treatment.   No prior allergy testing.   Past trials of medications- Zyrtec.   Benadryl, hydrocortisone      Current Outpatient Rx   Medication Sig Dispense Refill     cetirizine (ZYRTEC) 10 MG tablet Take 10 mg by mouth daily       cetirizine (ZYRTEC) 10 MG tablet Take 1 tablet (10 mg) by mouth daily 90 tablet 3     cyclobenzaprine (FLEXERIL) 10 MG tablet Take 1 tablet (10 mg) by mouth 3 times daily as needed for muscle spasms 30 tablet 3     STATIN NOT PRESCRIBED (INTENTIONAL) Please choose reason not prescribed from choices below.       venlafaxine (EFFEXOR XR) 75 MG 24 hr capsule Take 1 capsule (75 mg) by mouth daily 90 capsule 3        Allergies: Benoxinate hcl, Fluorescein sodium, and Fluorescein-benoxinate     Past Medical History:   Diagnosis Date     Malignant neoplasm of breast (female), unspecified 11/28/2005       Past Surgical History:   Procedure Laterality Date     APPENDECTOMY  2004     ARTHROPLASTY KNEE Left 09/12/2019    Procedure: LEFT TOTAL KNEE/NITHIN;  Surgeon: Mat Garner MD;  Location: [facility]     ARTHROSCOPY KNEE Left 04/09/2015    Procedure: ARTHROSCOPY KNEE;  Surgeon: rAden Oreilly MD;  Location: [facility] OR     AS TOTAL KNEE ARTHROPLASTY Left 09/01/2019     COLONOSCOPY N/A 04/25/2012    Normal. Repeat in 10 years.      DILATION AND CURETTAGE      D&C, multiple     DISSECT LYMPH NODE AXILLA Left 01/01/2005    Performed during bilateral mastectomy.      ENDOSCOPY UPPER, COLONOSCOPY, COMBINED N/A 5/27/2022    Procedure: Colonoscopy with polypectomy and upper endoscopy with biopsy;  Surgeon: Guevara Anderson MD;  Location: [facility]     ESOPHAGOSCOPY, GASTROSCOPY, DUODENOSCOPY (EGD), COMBINED  2012     HYSTERECTOMY TOTAL ABDOMINAL, BILATERAL SALPINGO-OOPHORECTOMY, COMBINED Bilateral 08/26/2004    no cervix, [facility]      MAMMOPLASTY AUGMENTATION BILATERAL Bilateral 06/2006    Bilateral, expansion with saline implants     MASTECTOMY MODIFIED RADICAL, SENTINEL NODE, COMBINED Bilateral 01/01/2005    Bilateral, lymph nodes removed from left side     TONSILLECTOMY  1961      BREAST BIOPSY CORE NEEDLE LEFT Left 2005       ENT family history reviewed    Social History     Tobacco Use     Smoking status: Every Day     Packs/day: 0.50     Years: 45.00     Pack years: 22.50     Types: Cigarettes     Start date: 1/1/1976     Smokeless tobacco: Never     Tobacco comments:     Pt denied QP 7/12/2021   Substance Use Topics     Alcohol use: Yes     Alcohol/week: 0.0 standard drinks     Comment: rarely     Drug use: No       Review of Systems  ROS: 10 point ROS neg other than the symptoms noted above in the HPI  "    Physical Exam  /76 (Cuff Size: Adult Large)   Pulse 83   Temp 97.1  F (36.2  C) (Tympanic)   Ht 1.791 m (5' 10.5\")   Wt 103.9 kg (229 lb)   SpO2 95%   BMI 32.39 kg/m      General - The patient is well nourished and well developed, and appears to have good nutritional status.  Alert and oriented to person and place, answers questions and cooperates with examination appropriately.   Head and Face - Normocephalic and atraumatic, with no gross asymmetry noted.  The facial nerve is intact, with strong symmetric movements.  Voice and Breathing - The patient was breathing comfortably without the use of accessory muscles. There was no wheezing, stridor, or stertor.  The patients voice was clear and strong, and had appropriate pitch and quality.  Ears -The external auditory canals are patent, the tympanic membranes are intact without effusion, retraction or mass.  Bony landmarks are intact.  Eyes - Extraocular movements intact, and the pupils were reactive to light.  Sclera were not icteric or injected, conjunctiva were pink and moist.  Mouth - Examination of the oral cavity showed pink, healthy oral mucosa. No lesions or ulcerations noted.  The tongue was mobile and midline, and the dentition were in good condition.    Throat - The walls of the oropharynx were smooth, pink, moist, symmetric, and had no lesions or ulcerations.  The tonsillar pillars and soft palate were symmetric.  The uvula was midline on elevation.    Neck - Normal midline excursion of the laryngotracheal complex during swallowing.  Full range of motion on passive movement.  Palpation of the occipital, submental, submandibular, internal jugular chain, and supraclavicular nodes did not demonstrate any abnormal lymph nodes or masses.  Palpation of the thyroid was soft and smooth, with no nodules or goiter appreciated.  The trachea was mobile and midline.  Nose - External contour is symmetric, no gross deflection or scars.  Nasal mucosa is " pink and moist with no abnormal mucus.  The septum was intact and the turbinates are enlarged.  No polyps, masses, or purulence noted on examination.  Skin- Normal. No active  Lesions.     Impression and Plan- Shefali Dailey is a 67 year old female with:    ICD-10-CM    1. Urticaria  L50.9       2. Allergic reaction, subsequent encounter  T78.40XD Adult ENT  Referral            Return for MQT  Discussed daily use of Zyrtec and holding prior to MQT.    reviewed food testing however no oral complaints nor noted exposures.     Take your antihistamine daily (cetirizine, loratadine, fexofenadine, or similar) or twice daily if recommended.    Allergen avoidance measures were discussed and are important in preventing symptoms from occurring or worsening.    Indications for allergy testing include:   1) Confirm suspicion of allergic rhinitis due to inhalant allergies  2) Identify the offending allergen to determine specific mode of treatment  3) In the case of chronic rhinosinusitis: when symptoms are not controlled by avoidance and pharmacotherapy  4) In the Asthma patient when exacerbations may be due to perennial allergen exposure  5) Suspect food allergy  6) Otitis Media, chronic rhinitis, atopic dermatitis, Meniere disease, headache, pharyngitis or eye symptoms    Due to the findings above,  modified quantitative testing (MQT) will be performed.  Signed consent was obtained, and the risks of immunotherapy were discussed, including the potential for anaphylaxis.    If immunotherapy (IT) is recommended, there is continued risk of anaphylaxis.   Anaphylaxis can cause death. The patient will need to be monitored for 30 minutes post injection.  They must present their epinephrine pen prior to injection.  Subcutaneous as well as sublingual immunotherapy (SLIT) were discussed as potential treatment options.  The patient was told SLIT is not approved by the FDA and is cash pay.  The general time frame of immunotherapy  was discussed (generally 3-5 years, sometimes longer), and the basic immunology behind IT was discussed.            Lily Berger PA-C  ENT  [facility], [place]

## 2023-01-24 NOTE — PATIENT INSTRUCTIONS
Return for allergy skin testing.   Hold Zyrtec prior to testing.           Thank you for allowing Lily Berger PA-C and our ENT team to participate in your care.  If your medications are too expensive, please give the nurse a call.  We can possibly change this medication.  If you have a scheduling or an appointment question please contact our Health Unit Coordinator at [phone], Ext. [phone].    ALL nursing questions or concerns can be directed to your ENT nurse at: [phone] [place]

## 2023-01-25 ENCOUNTER — TELEPHONE (OUTPATIENT)
Dept: ALLERGY | Facility: OTHER | Age: 68
End: 2023-01-25

## 2023-01-25 NOTE — TELEPHONE ENCOUNTER
Went over instructions with patient for allergy skin testing.  Reviewed patients current medications and patient will avoid all contraindicated medications prior to MQT testing.  Patient verbalizes understanding.  Copy of allergy testing packet will be mailed to the patient.  Patient is aware that she will be called to schedule her testing.  She is advised to call if she has any questions.    Amy Corbett RN on 1/25/2023 at 10:22 AM

## 2023-03-01 ENCOUNTER — TELEPHONE (OUTPATIENT)
Dept: ALLERGY | Facility: OTHER | Age: 68
End: 2023-03-01

## 2023-03-01 DIAGNOSIS — L50.9 URTICARIA: Primary | ICD-10-CM

## 2023-03-01 NOTE — TELEPHONE ENCOUNTER
Order for Loratadine 10mg chewable tab to be administered after allergy testing.  Order pended for review and signature.    Norberto Nice RN on 3/1/2023 at 10:06 AM

## 2023-03-08 NOTE — PROGRESS NOTES
"Patient returns to ENT for MQT and follow up   Hx of urticaria.       MQT- 3/9/23  Dilution #6- None  Dilution #5-None  Dilution #2- pigweed, birch, maple, oak, cottonwood, molds, cat.          No changes in related to products.   No chemical exposure  No oral symptoms.   No prior concerns.   No prior allergy testing.   Denies nasal congestion, rhinitis, sneezing.     Resides in a house with a partial finished basement  There is no water or mold  Carpet in bedroom - No  Heat in home- Steam.   Animals- No.   Family hx of allergies- daughter has some allergy concerns related to possible treatment.   No prior allergy testing.   Past trials of medications- Zyrtec.   Benadryl, hydrocortisone       Past Medical History:   Diagnosis Date     Malignant neoplasm of breast (female), unspecified 11/28/2005        Allergies   Allergen Reactions     Benoxinate Hcl Other (See Comments), Itching and Swelling     Burning  Fluress       Fluorescein Sodium Other (See Comments), Itching and Swelling     Burning  Fluress     Fluorescein-Benoxinate Other (See Comments)     Current Outpatient Medications   Medication     cetirizine (ZYRTEC) 10 MG tablet     cyclobenzaprine (FLEXERIL) 10 MG tablet     venlafaxine (EFFEXOR XR) 75 MG 24 hr capsule     Current Facility-Administered Medications   Medication     [START ON 3/9/2023] loratadine (CLARITIN) chewable tablet 10 mg     ROS- SEE HPI  /73 (BP Location: Right arm, Patient Position: Sitting, Cuff Size: Adult Regular)   Pulse 72   Temp 97.5  F (36.4  C) (Tympanic)   Ht 1.803 m (5' 11\")   Wt 104.3 kg (230 lb)   SpO2 95%   BMI 32.08 kg/m      General - The patient is well nourished and well developed, and appears to have good nutritional status.  Alert and oriented to person and place, answers questions and cooperates with examination appropriately.   Head and Face - Normocephalic and atraumatic, with no gross asymmetry noted.  The facial nerve is intact, with strong symmetric " movements.  Voice and Breathing - The patient was breathing comfortably without the use of accessory muscles. There was no wheezing, stridor, or stertor.  The patients voice was clear and strong, and had appropriate pitch and quality.  Skin- Normal post test         ASSESSMENT/ PLAN:      ICD-10-CM    1. Urticaria  L50.9       2. Allergic reaction, subsequent encounter  T78.40XD           Continue with Zyrtec 10 mg daily  May increase to twice a day if needed.   Start Pepcid 40 mg twice a day as needed for rash/ hives.     Follow up as needed    Lily Berger PA-C  ENT  [facility], [place]

## 2023-03-09 ENCOUNTER — OFFICE VISIT (OUTPATIENT)
Dept: OTOLARYNGOLOGY | Facility: OTHER | Age: 68
End: 2023-03-09
Attending: PHYSICIAN ASSISTANT
Payer: COMMERCIAL

## 2023-03-09 ENCOUNTER — OFFICE VISIT (OUTPATIENT)
Dept: ALLERGY | Facility: OTHER | Age: 68
End: 2023-03-09
Attending: PHYSICIAN ASSISTANT
Payer: COMMERCIAL

## 2023-03-09 VITALS
HEART RATE: 72 BPM | OXYGEN SATURATION: 95 % | TEMPERATURE: 97.5 F | HEIGHT: 71 IN | SYSTOLIC BLOOD PRESSURE: 132 MMHG | BODY MASS INDEX: 32.2 KG/M2 | DIASTOLIC BLOOD PRESSURE: 73 MMHG | WEIGHT: 230 LBS

## 2023-03-09 DIAGNOSIS — L50.9 URTICARIA: Primary | ICD-10-CM

## 2023-03-09 DIAGNOSIS — T78.40XD ALLERGIC REACTION, SUBSEQUENT ENCOUNTER: ICD-10-CM

## 2023-03-09 PROCEDURE — G0463 HOSPITAL OUTPT CLINIC VISIT: HCPCS | Mod: 25

## 2023-03-09 PROCEDURE — 95024 IQ TESTS W/ALLERGENIC XTRCS: CPT

## 2023-03-09 PROCEDURE — 95004 PERQ TESTS W/ALRGNC XTRCS: CPT

## 2023-03-09 PROCEDURE — 250N000013 HC RX MED GY IP 250 OP 250 PS 637: Performed by: PHYSICIAN ASSISTANT

## 2023-03-09 PROCEDURE — 99213 OFFICE O/P EST LOW 20 MIN: CPT | Mod: 25 | Performed by: PHYSICIAN ASSISTANT

## 2023-03-09 RX ADMIN — LORATADINE 10 MG: 5 TABLET, CHEWABLE ORAL at 10:00

## 2023-03-09 ASSESSMENT — PAIN SCALES - GENERAL: PAINLEVEL: NO PAIN (0)

## 2023-03-09 NOTE — PROGRESS NOTES
Shefali was seen for allergy skin testing. Patient was seen by this nurse in conjunction with ENT provider. All encounter details are documented in ENT Provider's appointment from this same date. Please see referenced encounter for this visits documentation.     Norberto Nice RN on 3/9/2023 at 10:00 AM     Eat healthy foods you enjoy. Rivaroxaban/Xarelto DOES NOT have a special diet. Limit your alcohol intake.

## 2023-03-09 NOTE — LETTER
"    3/9/2023         RE: Shefali Dailey  [address] [place] [address]        Dear Colleague,    Thank you for referring your patient, Shefali Dailey, to the [facility] [place]. Please see a copy of my visit note below.    Patient returns to ENT for MQT and follow up   Hx of urticaria.       MQT- 3/9/23  Dilution #6- None  Dilution #5-None  Dilution #2- pigweed, birch, maple, oak, cottonwood, molds, cat.          No changes in related to products.   No chemical exposure  No oral symptoms.   No prior concerns.   No prior allergy testing.   Denies nasal congestion, rhinitis, sneezing.     Resides in a house with a partial finished basement  There is no water or mold  Carpet in bedroom - No  Heat in home- Steam.   Animals- No.   Family hx of allergies- daughter has some allergy concerns related to possible treatment.   No prior allergy testing.   Past trials of medications- Zyrtec.   Benadryl, hydrocortisone       Past Medical History:   Diagnosis Date     Malignant neoplasm of breast (female), unspecified 11/28/2005        Allergies   Allergen Reactions     Benoxinate Hcl Other (See Comments), Itching and Swelling     Burning  Fluress       Fluorescein Sodium Other (See Comments), Itching and Swelling     Burning  Fluress     Fluorescein-Benoxinate Other (See Comments)     Current Outpatient Medications   Medication     cetirizine (ZYRTEC) 10 MG tablet     cyclobenzaprine (FLEXERIL) 10 MG tablet     venlafaxine (EFFEXOR XR) 75 MG 24 hr capsule     Current Facility-Administered Medications   Medication     [START ON 3/9/2023] loratadine (CLARITIN) chewable tablet 10 mg     ROS- SEE HPI  /73 (BP Location: Right arm, Patient Position: Sitting, Cuff Size: Adult Regular)   Pulse 72   Temp 97.5  F (36.4  C) (Tympanic)   Ht 1.803 m (5' 11\")   Wt 104.3 kg (230 lb)   SpO2 95%   BMI 32.08 kg/m      General - The patient is well nourished and well developed, and appears to have good nutritional " status.  Alert and oriented to person and place, answers questions and cooperates with examination appropriately.   Head and Face - Normocephalic and atraumatic, with no gross asymmetry noted.  The facial nerve is intact, with strong symmetric movements.  Voice and Breathing - The patient was breathing comfortably without the use of accessory muscles. There was no wheezing, stridor, or stertor.  The patients voice was clear and strong, and had appropriate pitch and quality.  Skin- Normal post test         ASSESSMENT/ PLAN:      ICD-10-CM    1. Urticaria  L50.9       2. Allergic reaction, subsequent encounter  T78.40XD           Continue with Zyrtec 10 mg daily  May increase to twice a day if needed.   Start Pepcid 40 mg twice a day as needed for rash/ hives.     Follow up as needed    Lily Berger PA-C  ENT  [facility], [place]          Again, thank you for allowing me to participate in the care of your patient.        Sincerely,        Lily Berger PA-C

## 2023-03-09 NOTE — PATIENT INSTRUCTIONS
Continue with Zyrtec 10 mg daily  May increase to twice a day if needed.   Start Pepcid 40 mg twice a day as needed for rash/ hives.     Follow up as needed      Thank you for allowing Lily Berger PA-C and our ENT team to participate in your care.  If your medications are too expensive, please give the nurse a call.  We can possibly change this medication.  If you have a scheduling or an appointment question please contact our Health Unit Coordinator at [phone], Ext. [phone].    ALL nursing questions or concerns can be directed to your ENT nurse at: [phone] Rahel

## 2023-03-09 NOTE — NURSING NOTE
This patient presents today for allergy skin testing.      Symptoms have included urticaria and hives.  Last Nov she was 6 wheeling and hunting.  She was picking milkweeds/pods for alpa.  She broke out in hives from head to toe.  She went to the ER and was given IV medications  (Benadryl, Steroids and Pepcid) she was discharged.  Symptoms cleared for 2-3 days, then the hives came back.  They gave her the Solu Medrol then.  She denies any changes to her environment or diet.  The cause of her hives is unknown.  She has been taking daily Zyrtec since and has had no further flare ups.  She denies any nasal congestion, rhinitis or sneezing.  No previous h/o COM or surgeries.  No past h/o sinus surgeries.  No asthma.  She had a tosillectomy at age 7.    This patient lives in a single family home, with a basement.  The home is older and in town.  No mold, water or moisture issues in the home.  There isn't any carpet in the.  Home has radiator/steam heat and window air conditioning unit.    This patient just got a new puppy 8 weeks ago.  The puppy doesn't sleep with her.    No allergy testing in the past.    This patient's medications have been reviewed prior to testing and all appropriate medications have been stopped.    Consent is signed by patient and signature is verified.     MQT/ID test is performed per protocol.  The patient tolerated testing well. Benadryl gel used for post test itch per protocol.  Chewable Claritin 10 mg given for post test itch per protocol.  All findings are recorded on the paper flow sheet. Results are reviewed with this patient.  They are given written information regarding allergy.       The patient will follow-up with Lily Berger PA-C for treatment plan.      Norberto Nice RN on 3/9/2023 at 10:02 AM

## 2023-05-05 ENCOUNTER — TELEPHONE (OUTPATIENT)
Dept: FAMILY MEDICINE | Facility: OTHER | Age: 68
End: 2023-05-05
Payer: COMMERCIAL

## 2023-05-05 DIAGNOSIS — Z87.891 PERSONAL HISTORY OF TOBACCO USE: Primary | ICD-10-CM

## 2023-05-05 PROCEDURE — G0296 VISIT TO DETERM LDCT ELIG: HCPCS | Performed by: FAMILY MEDICINE

## 2023-05-05 NOTE — TELEPHONE ENCOUNTER
Pt is due for annual lung cancer screening, order pended.         Lung Cancer Screening Shared Decision Making Visit     Shefali Dailey, a 67 year old female, is eligible for lung cancer screening    History   Smoking Status     Former     Packs/day: 0.50     Years: 47.00     Types: Cigarettes     Start date: 1/1/1976     Quit date: 4/1/2023   Smokeless Tobacco     Never       I have discussed with patient the risks and benefits of screening for lung cancer with low-dose CT.     The risks include:    radiation exposure: one low dose chest CT has as much ionizing radiation as about 15 chest x-rays, or 6 months of background radiation living in [place]      false positives: most findings/nodules are NOT cancer, but some might still require additional diagnostic evaluation, including biopsy    over-diagnosis: some slow growing cancers that might never have been clinically significant will be detected and treated unnecessarily     The benefit of early detection of lung cancer is contingent upon adherence to annual screening or more frequent follow up if indicated.     Furthermore, to benefit from screening, Shefali must be willing and able to undergo diagnostic procedures, if indicated. Although no specific guide is available for determining severity of comorbidities, it is reasonable to withhold screening in patients who have greater mortality risk from other diseases.     We did discuss that the best way to prevent lung cancer is to not smoke.    Some patients may value a numeric estimation of lung cancer risk when evaluating if lung cancer screening is right for them, here is one calculator:    ShouldIScreen

## 2023-05-05 NOTE — PATIENT INSTRUCTIONS
Lung Cancer Screening   Frequently Asked Questions  If you are at high-risk for lung cancer, getting screened with low-dose computed tomography (LDCT) every year can help save your life. This handout offers answers to some of the most common questions about lung cancer screening. If you have other questions, please call [phone] ([phone]).     What is it?  Lung cancer screening uses special X-ray technology to create an image of your lung tissue. The exam is quick and easy and takes less than 10 seconds. We don t give you any medicine or use any needles. You can eat before and after the exam. You don t need to change your clothes as long as the clothing on your chest doesn t contain metal. But, you do need to be able to hold your breath for at least 6 seconds during the exam.    What is the goal of lung cancer screening?  The goal of lung cancer screening is to save lives. Many times, lung cancer is not found until a person starts having physical symptoms. Lung cancer screening can help detect lung cancer in the earliest stages when it may be easier to treat.    Who should be screened for lung cancer?  We suggest lung cancer screening for anyone who is at high-risk for lung cancer. You are in the high-risk group if you:      are between the ages of 55 and 79, and    have smoked at least 1 pack of cigarettes a day for 20 or more years, and    still smoke or have quit within the past 15 years.    However, if you have a new cough or shortness of breath, you should talk to your doctor before being screened.    Why does it matter if I have symptoms?  Certain symptoms can be a sign that you have a condition in your lungs that should be checked and treated by your doctor. These symptoms include fever, chest pain, a new or changing cough, shortness of breath that you have never felt before, coughing up blood or unexplained weight loss. Having any of these symptoms can greatly affect the results of lung  cancer screening.       Should all smokers get an LDCT lung cancer screening exam?  It depends. Lung cancer screening is for a very specific group of men and women who have a history of heavy smoking over a long period of time (see  Who should be screened for lung cancer  above).  I am in the high-risk group, but have been diagnosed with cancer in the past. Is LDCT lung cancer screening right for me?  In some cases, you should not have LDCT lung screening, such as when your doctor is already following your cancer with CT scan studies. Your doctor will help you decide if LDCT lung screening is right for you.  Do I need to have a screening exam every year?  Yes. If you are in the high-risk group described earlier, you should get an LDCT lung cancer screening exam every year until you are 79, or are no longer willing or able to undergo screening and possible procedures to diagnose and treat lung cancer.  How effective is LDCT at preventing death from lung cancer?  Studies have shown that LDCT lung cancer screening can lower the risk of death from lung cancer by 20 percent in people who are at high-risk.  What are the risks?  There are some risks and limitations of LDCT lung cancer screening. We want to make sure you understand the risks and benefits, so please let us know if you have any questions. Your doctor may want to talk with you more about these risks.    Radiation exposure: As with any exam that uses radiation, there is a very small increased risk of cancer. The amount of radiation in LDCT is small--about the same amount a person would get from a mammogram. Your doctor orders the exam when he or she feels the potential benefits outweigh the risks.    False negatives: No test is perfect, including LDCT. It is possible that you may have a medical condition, including lung cancer, that is not found during your exam. This is called a false negative result.    False positives and more testing: LDCT very often finds  something in the lung that could be cancer, but in fact is not. This is called a false positive result. False positive tests often cause anxiety. To make sure these findings are not cancer, you may need to have more tests. These tests will be done only if you give us permission. Sometimes patients need a treatment that can have side effects, such as a biopsy. For more information on false positives, see  What can I expect from the results?     Findings not related to lung cancer: Your LDCT exam also takes pictures of areas of your body next to your lungs. In a very small number of cases, the CT scan will show an abnormal finding in one of these areas, such as your kidneys, adrenal glands, liver or thyroid. This finding may not be serious, but you may need more tests. Your doctor can help you decide what other tests you may need, if any.  What can I expect from the results?  About 1 out of 4 LDCT exams will find something that may need more tests. Most of the time, these findings are lung nodules. Lung nodules are very small collections of tissue in the lung. These nodules are very common, and the vast majority--more than 97 percent--are not cancer (benign). Most are normal lymph nodes or small areas of scarring from past infections.  But, if a small lung nodule is found to be cancer, the cancer can be cured more than 90 percent of the time. To know if the nodule is cancer, we may need to get more images before your next yearly screening exam. If the nodule has suspicious features (for example, it is large, has an odd shape or grows over time), we will refer you to a specialist for further testing.  Will my doctor also get the results?  Yes. Your doctor will get a copy of your results.  Is it okay to keep smoking now that there s a cancer screening exam?  No. Tobacco is one of the strongest cancer-causing agents. It causes not only lung cancer, but other cancers and cardiovascular (heart) diseases as well. The damage  caused by smoking builds over time. This means that the longer you smoke, the higher your risk of disease. While it is never too late to quit, the sooner you quit, the better.  Where can I find help to quit smoking?  The best way to prevent lung cancer is to stop smoking. If you have already quit smoking, congratulations and keep it up! For help on quitting smoking, please call [employer] at 9-134-QUITNOW (1-227.812.5485) or the American [employer] Society at 1-502.196.6361 to find local resources near you.  One-on-one health coaching:  If you d prefer to work individually with a health care provider on tobacco cessation, we offer:      Medication Therapy Management:  Our specially trained pharmacists work closely with you and your doctor to help you quit smoking.  Call [phone] or [phone] (toll free).

## 2023-05-09 ENCOUNTER — HOSPITAL ENCOUNTER (OUTPATIENT)
Dept: CT IMAGING | Facility: HOSPITAL | Age: 68
Discharge: HOME | End: 2023-05-09
Attending: FAMILY MEDICINE | Admitting: FAMILY MEDICINE
Payer: COMMERCIAL

## 2023-05-09 DIAGNOSIS — Z87.891 PERSONAL HISTORY OF TOBACCO USE: ICD-10-CM

## 2023-05-09 PROCEDURE — 71271 CT THORAX LUNG CANCER SCR C-: CPT

## 2023-06-29 DIAGNOSIS — F43.0 ACUTE REACTION TO STRESS: ICD-10-CM

## 2023-06-29 RX ORDER — VENLAFAXINE HYDROCHLORIDE 75 MG/1
CAPSULE, EXTENDED RELEASE ORAL
Qty: 180 CAPSULE | Refills: 0 | Status: SHIPPED | OUTPATIENT
Start: 2023-06-29 | End: 2023-12-05

## 2023-06-29 NOTE — TELEPHONE ENCOUNTER
Venlafaxine      Last Written Prescription Date:  1/3/23  Last Fill Quantity: 90,   # refills: 3  Last Office Visit: 1/3/23  Future Office visit:

## 2023-07-23 ENCOUNTER — HEALTH MAINTENANCE LETTER (OUTPATIENT)
Age: 68
End: 2023-07-23

## 2023-10-01 ENCOUNTER — HEALTH MAINTENANCE LETTER (OUTPATIENT)
Age: 68
End: 2023-10-01

## 2023-10-10 ENCOUNTER — MYC MEDICAL ADVICE (OUTPATIENT)
Dept: FAMILY MEDICINE | Facility: OTHER | Age: 68
End: 2023-10-10

## 2023-11-28 ASSESSMENT — ENCOUNTER SYMPTOMS
FEVER: 0
HEMATURIA: 0
NERVOUS/ANXIOUS: 0
COUGH: 0
SHORTNESS OF BREATH: 0
NAUSEA: 0
CHILLS: 0
EYE PAIN: 0
ARTHRALGIAS: 0
DIZZINESS: 0
HEMATOCHEZIA: 0
MYALGIAS: 0
WEAKNESS: 0
SORE THROAT: 0
HEADACHES: 0
JOINT SWELLING: 1
PALPITATIONS: 0
DIARRHEA: 0
BREAST MASS: 0
CONSTIPATION: 0
ABDOMINAL PAIN: 0
FREQUENCY: 1
HEARTBURN: 0
PARESTHESIAS: 0
DYSURIA: 0

## 2023-11-28 ASSESSMENT — ACTIVITIES OF DAILY LIVING (ADL): CURRENT_FUNCTION: NO ASSISTANCE NEEDED

## 2023-12-01 ASSESSMENT — ENCOUNTER SYMPTOMS
HEADACHES: 0
DYSURIA: 0
NERVOUS/ANXIOUS: 0
HEMATOCHEZIA: 0
FEVER: 0
HEARTBURN: 0
NAUSEA: 0
JOINT SWELLING: 1
CHILLS: 0
HEMATURIA: 0
SHORTNESS OF BREATH: 0
DIARRHEA: 0
COUGH: 0
FREQUENCY: 1
ARTHRALGIAS: 0
ABDOMINAL PAIN: 0
MYALGIAS: 0
CONSTIPATION: 0
EYE PAIN: 0
BREAST MASS: 0
SORE THROAT: 0
DIZZINESS: 0
PARESTHESIAS: 0
WEAKNESS: 0
PALPITATIONS: 0

## 2023-12-01 ASSESSMENT — ACTIVITIES OF DAILY LIVING (ADL): CURRENT_FUNCTION: NO ASSISTANCE NEEDED

## 2023-12-01 NOTE — PROGRESS NOTES
"SUBJECTIVE:   Shefali is a 68 year old, presenting for the following:  No chief complaint on file.        Are you in the first 12 months of your Medicare coverage?  No    Healthy Habits:     In general, how would you rate your overall health?  Good    Frequency of exercise:  1 day/week    Duration of exercise:  Less than 15 minutes    Do you usually eat at least 4 servings of fruit and vegetables a day, include whole grains    & fiber and avoid regularly eating high fat or \"junk\" foods?  Yes    Taking medications regularly:  Yes    Medication side effects:  None    Ability to successfully perform activities of daily living:  No assistance needed    Home Safety:  No safety concerns identified    Hearing Impairment:  No hearing concerns    In the past 6 months, have you been bothered by leaking of urine? Yes    In general, how would you rate your overall mental or emotional health?  Excellent          Have you ever done Advance Care Planning? (For example, a Health Directive, POLST, or a discussion with a medical provider or your loved ones about your wishes): No, advance care planning information given to patient to review.  Advanced care planning was discussed at today's visit.       Fall risk  Fallen 2 or more times in the past year?: No  Any fall with injury in the past year?: No    Cognitive Screening   1) Repeat 3 items (Leader, Season, Table)    2) Clock draw: NORMAL  3) 3 item recall: Recalls 2 objects   Results: NORMAL clock, 1-2 items recalled: COGNITIVE IMPAIRMENT LESS LIKELY    Mini-CogTM Copyright VINNY Dejesus. Licensed by the author for use in [facility]; reprinted with permission (jaison@.[facility]). All rights reserved.      Do you have sleep apnea, excessive snoring or daytime drowsiness? : No, but was told she snores    Social History     Tobacco Use     Smoking status: Former     Packs/day: 0.50     Years: 47.00     Additional pack years: 0.00     Total pack years: 23.50     Types: Cigarettes     " Start date: 1976     Quit date: 2023     Years since quittin.6     Smokeless tobacco: Never     Tobacco comments:     Pt denied QP 2021   Substance Use Topics     Alcohol use: Yes     Alcohol/week: 0.0 standard drinks of alcohol     Comment: rarely             2023     9:36 AM   Alcohol Use   Prescreen: >3 drinks/day or >7 drinks/week? No     Do you have a current opioid prescription? No  Do you use any other controlled substances or medications that are not prescribed by a provider? None    Current providers sharing in care for this patient include:   Patient Care Team:  Yosef Gonzales MD as PCP - General  Yosef Gonzales MD as Assigned PCP  Lily Berger PA-C as Assigned Surgical Provider    The following health maintenance items are reviewed in Epic and correct as of today:  Health Maintenance   Topic Date Due     DEXA  Never done     EYE EXAM  Never done     Pneumococcal Vaccine: 65+ Years (1 - PCV) Never done     ZOSTER IMMUNIZATION (1 of 2) Never done     RSV VACCINE (Pregnancy & 60+) (1 - 1-dose 60+ series) Never done     MEDICARE ANNUAL WELLNESS VISIT  2022     LIPID  2023     A1C  2023     INFLUENZA VACCINE (1) 2023     COVID-19 Vaccine (5 - -24 season) 2023     NICOTINE/TOBACCO CESSATION COUNSELING Q 1 YR  2024     BMP  2024     MICROALBUMIN  2024     TSH W/FREE T4 REFLEX  2024     DIABETIC FOOT EXAM  2024     LUNG CANCER SCREENING  2024     FALL RISK ASSESSMENT  2024     DTAP/TDAP/TD IMMUNIZATION (3 - Td or Tdap) 2026     ADVANCE CARE PLANNING  2027     COLORECTAL CANCER SCREENING  2027     HEPATITIS C SCREENING  Completed     PHQ-2 (once per calendar year)  Completed     IPV IMMUNIZATION  Aged Out     HPV IMMUNIZATION  Aged Out     MENINGITIS IMMUNIZATION  Aged Out     RSV MONOCLONAL ANTIBODY  Aged Out     Lab work is in process      FHS-7:       2023     9:39 AM   Breast CA  Risk Assessment (FHS-7)   Did any of your first-degree relatives have breast or ovarian cancer? Yes   Did any of your relatives have bilateral breast cancer? No   Did any man in your family have breast cancer? Unknown   Did any woman in your family have breast and ovarian cancer? Yes   Did any woman in your family have breast cancer before age 50 y? Yes   Do you have 2 or more relatives with breast and/or ovarian cancer? Unknown   Do you have 2 or more relatives with breast and/or bowel cancer? Yes         Pertinent mammograms are reviewed under the imaging tab.    Review of Systems   Constitutional:  Negative for chills and fever.   HENT:  Negative for congestion, ear pain, hearing loss and sore throat.    Eyes:  Negative for pain and visual disturbance.   Respiratory:  Negative for cough and shortness of breath.    Cardiovascular:  Negative for chest pain, palpitations and peripheral edema.   Gastrointestinal:  Negative for abdominal pain, constipation, diarrhea, heartburn, hematochezia and nausea.   Breasts:  Negative for tenderness, breast mass and discharge.   Genitourinary:  Positive for frequency and urgency. Negative for dysuria, genital sores, hematuria, pelvic pain, vaginal bleeding and vaginal discharge.   Musculoskeletal:  Positive for joint swelling. Negative for arthralgias and myalgias.   Skin:  Negative for rash.   Neurological:  Negative for dizziness, weakness, headaches and paresthesias.   Psychiatric/Behavioral:  Negative for mood changes. The patient is not nervous/anxious.      CONSTITUTIONAL: NEGATIVE for fever, chills, change in weight  INTEGUMENTARY/SKIN: NEGATIVE for worrisome rashes, moles or lesions  EYES: NEGATIVE for vision changes or irritation  ENT/MOUTH: NEGATIVE for ear, mouth and throat problems  RESP: NEGATIVE for significant cough or SOB  BREAST: NEGATIVE for masses, tenderness or discharge  CV: NEGATIVE for chest pain, palpitations or peripheral edema  GI: NEGATIVE for nausea,  "abdominal pain, heartburn, or change in bowel habits  : NEGATIVE for frequency, dysuria, or hematuria  MUSCULOSKELETAL: NEGATIVE for significant arthralgias or myalgia  NEURO: NEGATIVE for weakness, dizziness or paresthesias  ENDOCRINE: NEGATIVE for temperature intolerance, skin/hair changes  HEME: NEGATIVE for bleeding problems  PSYCHIATRIC: NEGATIVE for changes in mood or affect    OBJECTIVE:   There were no vitals taken for this visit. Estimated body mass index is 32.08 kg/m  as calculated from the following:    Height as of 3/9/23: 1.803 m (5' 11\").    Weight as of 3/9/23: 104.3 kg (230 lb).  Physical Exam  GENERAL: healthy, alert and no distress  EYES: Eyes grossly normal to inspection, PERRL and conjunctivae and sclerae normal  HENT: ear canals and TM's normal, nose and mouth without ulcers or lesions  NECK: no adenopathy, no asymmetry, masses, or scars and thyroid normal to palpation  RESP: lungs clear to auscultation - no rales, rhonchi or wheezes  CV: regular rate and rhythm, normal S1 S2, no S3 or S4, no murmur, click or rub, no peripheral edema and peripheral pulses strong  ABDOMEN: soft, nontender, no hepatosplenomegaly, no masses and bowel sounds normal  MS: no gross musculoskeletal defects noted, no edema  SKIN: no suspicious lesions or rashes  NEURO: Normal strength and tone, mentation intact and speech normal  PSYCH: mentation appears normal, affect normal/bright    Diagnostic Test Results:  Labs reviewed in Epic    ASSESSMENT / PLAN:       ICD-10-CM    1. Medicare annual wellness visit, subsequent  Z00.00 DX Hip/Pelvis/Spine      2. Type 2 diabetes mellitus without complication, without long-term current use of insulin (H)  E11.9 Comprehensive metabolic panel     Lipid Profile     Hemoglobin A1c     Hemoglobin A1c     Lipid Profile     Comprehensive metabolic panel      3. Need for prophylactic vaccination and inoculation against influenza  Z23       4. Asymptomatic menopausal state  Z78.0 DX " "Hip/Pelvis/Spine      5. Acute reaction to stress  F43.0 venlafaxine (EFFEXOR XR) 75 MG 24 hr capsule      6. Spinal stenosis, lumbar region, without neurogenic claudication  M48.061 cyclobenzaprine (FLEXERIL) 10 MG tablet          Patient has been advised of split billing requirements and indicates understanding: Yes      COUNSELING:  Reviewed preventive health counseling, as reflected in patient instructions      BMI:   Estimated body mass index is 32.08 kg/m  as calculated from the following:    Height as of 3/9/23: 1.803 m (5' 11\").    Weight as of 3/9/23: 104.3 kg (230 lb).         She reports that she quit smoking about 8 months ago. Her smoking use included cigarettes. She started smoking about 47 years ago. She has a 23.50 pack-year smoking history. She has never used smokeless tobacco.      Appropriate preventive services were discussed with this patient, including applicable screening as appropriate for fall prevention, nutrition, physical activity, Tobacco-use cessation, weight loss and cognition.  Checklist reviewing preventive services available has been given to the patient.    Reviewed patients plan of care and provided an AVS. The Basic Care Plan (routine screening as documented in Health Maintenance) for Shefali meets the Care Plan requirement. This Care Plan has been established and reviewed with the Patient.          Yosef Gonzales MD  [facility]    Identified Health Risks:  I have reviewed Opioid Use Disorder and Substance Use Disorder risk factors and made any needed referrals.   "

## 2023-12-05 ENCOUNTER — OFFICE VISIT (OUTPATIENT)
Dept: FAMILY MEDICINE | Facility: OTHER | Age: 68
End: 2023-12-05
Attending: FAMILY MEDICINE
Payer: COMMERCIAL

## 2023-12-05 VITALS
BODY MASS INDEX: 33.43 KG/M2 | HEIGHT: 70 IN | TEMPERATURE: 98.4 F | SYSTOLIC BLOOD PRESSURE: 156 MMHG | HEART RATE: 92 BPM | OXYGEN SATURATION: 95 % | DIASTOLIC BLOOD PRESSURE: 68 MMHG | WEIGHT: 233.5 LBS

## 2023-12-05 DIAGNOSIS — E11.9 TYPE 2 DIABETES MELLITUS WITHOUT COMPLICATION, WITHOUT LONG-TERM CURRENT USE OF INSULIN (H): ICD-10-CM

## 2023-12-05 DIAGNOSIS — Z23 NEED FOR PROPHYLACTIC VACCINATION AND INOCULATION AGAINST INFLUENZA: ICD-10-CM

## 2023-12-05 DIAGNOSIS — M48.061 SPINAL STENOSIS, LUMBAR REGION, WITHOUT NEUROGENIC CLAUDICATION: ICD-10-CM

## 2023-12-05 DIAGNOSIS — Z78.0 ASYMPTOMATIC MENOPAUSAL STATE: ICD-10-CM

## 2023-12-05 DIAGNOSIS — F43.0 ACUTE REACTION TO STRESS: ICD-10-CM

## 2023-12-05 DIAGNOSIS — Z00.00 MEDICARE ANNUAL WELLNESS VISIT, SUBSEQUENT: Primary | ICD-10-CM

## 2023-12-05 LAB
ALBUMIN SERPL BCG-MCNC: 4.8 G/DL (ref 3.5–5.2)
ALP SERPL-CCNC: 88 U/L (ref 40–150)
ALT SERPL W P-5'-P-CCNC: 31 U/L (ref 0–50)
ANION GAP SERPL CALCULATED.3IONS-SCNC: 14 MMOL/L (ref 7–15)
AST SERPL W P-5'-P-CCNC: 22 U/L (ref 0–45)
BILIRUB SERPL-MCNC: 0.3 MG/DL
BUN SERPL-MCNC: 16.2 MG/DL (ref 8–23)
CALCIUM SERPL-MCNC: 9.8 MG/DL (ref 8.8–10.2)
CHLORIDE SERPL-SCNC: 100 MMOL/L (ref 98–107)
CHOLEST SERPL-MCNC: 261 MG/DL
CREAT SERPL-MCNC: 0.71 MG/DL (ref 0.51–0.95)
DEPRECATED HCO3 PLAS-SCNC: 24 MMOL/L (ref 22–29)
EGFRCR SERPLBLD CKD-EPI 2021: >90 ML/MIN/1.73M2
EST. AVERAGE GLUCOSE BLD GHB EST-MCNC: 134 MG/DL
FASTING STATUS PATIENT QL REPORTED: YES
GLUCOSE SERPL-MCNC: 115 MG/DL (ref 70–99)
HBA1C MFR BLD: 6.3 %
HDLC SERPL-MCNC: 38 MG/DL
LDLC SERPL CALC-MCNC: ABNORMAL MG/DL
NONHDLC SERPL-MCNC: 223 MG/DL
POTASSIUM SERPL-SCNC: 4.6 MMOL/L (ref 3.4–5.3)
PROT SERPL-MCNC: 7.4 G/DL (ref 6.4–8.3)
SODIUM SERPL-SCNC: 138 MMOL/L (ref 135–145)
TRIGL SERPL-MCNC: 507 MG/DL

## 2023-12-05 PROCEDURE — G0463 HOSPITAL OUTPT CLINIC VISIT: HCPCS | Mod: 25

## 2023-12-05 PROCEDURE — G0438 PPPS, INITIAL VISIT: HCPCS | Performed by: FAMILY MEDICINE

## 2023-12-05 PROCEDURE — 80061 LIPID PANEL: CPT | Mod: ZL | Performed by: FAMILY MEDICINE

## 2023-12-05 PROCEDURE — 83036 HEMOGLOBIN GLYCOSYLATED A1C: CPT | Mod: ZL | Performed by: FAMILY MEDICINE

## 2023-12-05 PROCEDURE — G0008 ADMIN INFLUENZA VIRUS VAC: HCPCS

## 2023-12-05 PROCEDURE — 36415 COLL VENOUS BLD VENIPUNCTURE: CPT | Mod: ZL | Performed by: FAMILY MEDICINE

## 2023-12-05 PROCEDURE — 99213 OFFICE O/P EST LOW 20 MIN: CPT | Mod: 25 | Performed by: FAMILY MEDICINE

## 2023-12-05 PROCEDURE — 82374 ASSAY BLOOD CARBON DIOXIDE: CPT | Mod: ZL | Performed by: FAMILY MEDICINE

## 2023-12-05 RX ORDER — VENLAFAXINE HYDROCHLORIDE 75 MG/1
150 CAPSULE, EXTENDED RELEASE ORAL DAILY
Qty: 180 CAPSULE | Refills: 3 | Status: SHIPPED | OUTPATIENT
Start: 2023-12-05

## 2023-12-05 RX ORDER — CYCLOBENZAPRINE HCL 10 MG
10 TABLET ORAL 3 TIMES DAILY PRN
Qty: 30 TABLET | Refills: 3 | Status: SHIPPED | OUTPATIENT
Start: 2023-12-05

## 2023-12-06 DIAGNOSIS — E11.9 TYPE 2 DIABETES MELLITUS WITHOUT COMPLICATION, WITHOUT LONG-TERM CURRENT USE OF INSULIN (H): Primary | ICD-10-CM

## 2024-01-04 ENCOUNTER — HOSPITAL ENCOUNTER (OUTPATIENT)
Dept: BONE DENSITY | Facility: HOSPITAL | Age: 69
Discharge: HOME | End: 2024-01-04
Attending: FAMILY MEDICINE | Admitting: FAMILY MEDICINE
Payer: COMMERCIAL

## 2024-01-04 ENCOUNTER — TELEPHONE (OUTPATIENT)
Dept: FAMILY MEDICINE | Facility: OTHER | Age: 69
End: 2024-01-04

## 2024-01-04 DIAGNOSIS — Z00.00 MEDICARE ANNUAL WELLNESS VISIT, SUBSEQUENT: ICD-10-CM

## 2024-01-04 DIAGNOSIS — Z78.0 ASYMPTOMATIC MENOPAUSAL STATE: ICD-10-CM

## 2024-01-04 DIAGNOSIS — M81.0 OSTEOPOROSIS, UNSPECIFIED OSTEOPOROSIS TYPE, UNSPECIFIED PATHOLOGICAL FRACTURE PRESENCE: Primary | ICD-10-CM

## 2024-01-04 PROCEDURE — 77080 DXA BONE DENSITY AXIAL: CPT

## 2024-01-04 RX ORDER — ALENDRONATE SODIUM 70 MG/1
70 TABLET ORAL
Qty: 12 TABLET | Refills: 3 | Status: SHIPPED | OUTPATIENT
Start: 2024-01-04

## 2024-01-22 ENCOUNTER — LAB (OUTPATIENT)
Dept: LAB | Facility: OTHER | Age: 69
End: 2024-01-22
Payer: COMMERCIAL

## 2024-01-22 DIAGNOSIS — E11.9 TYPE 2 DIABETES MELLITUS WITHOUT COMPLICATION, WITHOUT LONG-TERM CURRENT USE OF INSULIN (H): ICD-10-CM

## 2024-01-22 LAB
ALBUMIN SERPL BCG-MCNC: 4.5 G/DL (ref 3.5–5.2)
ALP SERPL-CCNC: 91 U/L (ref 40–150)
ALT SERPL W P-5'-P-CCNC: 25 U/L (ref 0–50)
ANION GAP SERPL CALCULATED.3IONS-SCNC: 11 MMOL/L (ref 7–15)
AST SERPL W P-5'-P-CCNC: 25 U/L (ref 0–45)
BILIRUB SERPL-MCNC: 0.4 MG/DL
BUN SERPL-MCNC: 18.3 MG/DL (ref 8–23)
CALCIUM SERPL-MCNC: 9.1 MG/DL (ref 8.8–10.2)
CHLORIDE SERPL-SCNC: 102 MMOL/L (ref 98–107)
CHOLEST SERPL-MCNC: 298 MG/DL
CREAT SERPL-MCNC: 0.92 MG/DL (ref 0.51–0.95)
DEPRECATED HCO3 PLAS-SCNC: 27 MMOL/L (ref 22–29)
EGFRCR SERPLBLD CKD-EPI 2021: 68 ML/MIN/1.73M2
FASTING STATUS PATIENT QL REPORTED: YES
GLUCOSE SERPL-MCNC: 133 MG/DL (ref 70–99)
HDLC SERPL-MCNC: 36 MG/DL
LDLC SERPL CALC-MCNC: ABNORMAL MG/DL
NONHDLC SERPL-MCNC: 262 MG/DL
POTASSIUM SERPL-SCNC: 4.1 MMOL/L (ref 3.4–5.3)
PROT SERPL-MCNC: 7.2 G/DL (ref 6.4–8.3)
SODIUM SERPL-SCNC: 140 MMOL/L (ref 135–145)
TRIGL SERPL-MCNC: 706 MG/DL

## 2024-01-22 PROCEDURE — 36415 COLL VENOUS BLD VENIPUNCTURE: CPT | Mod: ZL

## 2024-01-22 PROCEDURE — 80061 LIPID PANEL: CPT | Mod: ZL

## 2024-01-22 PROCEDURE — 84443 ASSAY THYROID STIM HORMONE: CPT | Mod: ZL

## 2024-01-22 PROCEDURE — 80053 COMPREHEN METABOLIC PANEL: CPT | Mod: ZL

## 2024-01-22 NOTE — PROGRESS NOTES
"  Assessment & Plan     Type 2 diabetes mellitus without complication, without long-term current use of insulin (H)  Stable overall.  Work diet/ex and 3 month followup.  Borderline A1c but going up some.    - TSH with free T4 reflex; Future  - Hemoglobin A1c; Future    Mixed hyperlipidemia  Not controlled.  Add crestor.  3 month lab recheck including the cmp to check on the liver.    - Lipid Profile (Chol, Trig, HDL, LDL calc); Future  - Comprehensive metabolic panel (BMP + Alb, Alk Phos, ALT, AST, Total. Bili, TP); Future  - rosuvastatin (CRESTOR) 20 MG tablet; Take 1 tablet (20 mg) by mouth daily    Age-related osteoporosis without current pathological fracture  Lengthy review.  Plan to continue the fosamax along with the vitamin D and calcium bid.              BMI  Estimated body mass index is 33.72 kg/m  as calculated from the following:    Height as of 12/5/23: 1.778 m (5' 10\").    Weight as of this encounter: 106.6 kg (235 lb).           No follow-ups on file.    Antonieta Meehan is a 68 year old, presenting for the following health issues:  RECHECK    HPI     Pt is here to discuss lipid labs.             Review of Systems  Constitutional, HEENT, cardiovascular, pulmonary, gi and gu systems are negative, except as otherwise noted.      Objective    /76   Pulse 86   Temp 97.3  F (36.3  C) (Tympanic)   Wt 106.6 kg (235 lb)   SpO2 97%   BMI 33.72 kg/m    Body mass index is 33.72 kg/m .  Physical Exam   GENERAL: alert and no distress  NECK: no adenopathy, no asymmetry, masses, or scars  RESP: lungs clear to auscultation - no rales, rhonchi or wheezes  CV: regular rate and rhythm, normal S1 S2, no S3 or S4, no murmur, click or rub, no peripheral edema  ABDOMEN: soft, nontender, no hepatosplenomegaly, no masses and bowel sounds normal  MS: no gross musculoskeletal defects noted, no edema        Labs reviewed in detail and imaging for dexa reviewed in detail.     Signed Electronically by: Yosef ZAMORA" MD Christian  l

## 2024-01-23 ENCOUNTER — OFFICE VISIT (OUTPATIENT)
Dept: FAMILY MEDICINE | Facility: OTHER | Age: 69
End: 2024-01-23
Attending: FAMILY MEDICINE
Payer: COMMERCIAL

## 2024-01-23 VITALS
DIASTOLIC BLOOD PRESSURE: 76 MMHG | SYSTOLIC BLOOD PRESSURE: 132 MMHG | TEMPERATURE: 97.3 F | OXYGEN SATURATION: 97 % | HEART RATE: 86 BPM | BODY MASS INDEX: 33.72 KG/M2 | WEIGHT: 235 LBS

## 2024-01-23 DIAGNOSIS — M81.0 AGE-RELATED OSTEOPOROSIS WITHOUT CURRENT PATHOLOGICAL FRACTURE: ICD-10-CM

## 2024-01-23 DIAGNOSIS — E78.2 MIXED HYPERLIPIDEMIA: ICD-10-CM

## 2024-01-23 DIAGNOSIS — E11.9 TYPE 2 DIABETES MELLITUS WITHOUT COMPLICATION, WITHOUT LONG-TERM CURRENT USE OF INSULIN (H): Primary | ICD-10-CM

## 2024-01-23 LAB — TSH SERPL DL<=0.005 MIU/L-ACNC: 3.73 UIU/ML (ref 0.3–4.2)

## 2024-01-23 PROCEDURE — G0463 HOSPITAL OUTPT CLINIC VISIT: HCPCS

## 2024-01-23 PROCEDURE — 99214 OFFICE O/P EST MOD 30 MIN: CPT | Performed by: FAMILY MEDICINE

## 2024-01-23 RX ORDER — ROSUVASTATIN CALCIUM 20 MG/1
20 TABLET, COATED ORAL DAILY
Qty: 90 TABLET | Refills: 3 | Status: SHIPPED | OUTPATIENT
Start: 2024-01-23

## 2024-01-23 RX ORDER — CALCIUM CARBONATE/VITAMIN D3 600 MG-10
1 TABLET ORAL 2 TIMES DAILY
COMMUNITY

## 2024-01-23 ASSESSMENT — PAIN SCALES - GENERAL: PAINLEVEL: SEVERE PAIN (6)

## 2024-04-16 NOTE — PROGRESS NOTES
"  Assessment & Plan     Personal history of tobacco use  Update.     - CT Chest Lung Cancer Screen Low Dose Without; Future    Type 2 diabetes mellitus without complication, without long-term current use of insulin (H)  Stable overall.  Poor diet over the last 3 months or so due to multiple funerals unfortunately.  Going to give it a 3 month try to reassess without meds. Consider med if ongoing increase in the A1c.     - MD FOOT EXAM NO CHARGE  - Albumin Random Urine Quantitative with Creat Ratio; Future    Spinal stenosis, lumbar region, without neurogenic claudication  Stable symptoms.  Uses flexeril when it flares.  Will continue.      Mixed hyperlipidemia  Marked improvement.  Continue crestor indefinitely.         (B02.9) Herpes zoster without complication  Comment: right flank.    Plan: advised on likely shingles.  Too late to treat.  Consider vaccine going forward.                  BMI  Estimated body mass index is 33.15 kg/m  as calculated from the following:    Height as of 12/5/23: 1.778 m (5' 10\").    Weight as of this encounter: 104.8 kg (231 lb).             No follow-ups on file.    Antonieta Meehan is a 68 year old, presenting for the following health issues:  Diabetes        4/23/2024     8:24 AM   Additional Questions   Roomed by Raeann LOPEZ   Accompanied by self     HPI     Diabetes Follow-up    How often are you checking your blood sugar? Not at all  What concerns do you have today about your diabetes? None   Do you have any of these symptoms? (Select all that apply)  No numbness or tingling in feet.  No redness, sores or blisters on feet.  No complaints of excessive thirst.  No reports of blurry vision.  No significant changes to weight.  Have you had a diabetic eye exam in the last 12 months? No        BP Readings from Last 2 Encounters:   04/23/24 128/66   01/23/24 132/76     Hemoglobin A1C (%)   Date Value   04/22/2024 6.4 (H)   12/05/2023 6.3 (H)   02/23/2021 6.1 (H)     LDL Cholesterol " Calculated   Date Value   04/22/2024 49 mg/dL   01/22/2024      Comment:     Cannot estimate LDL when triglyceride exceeds 400 mg/dL   02/23/2021 130 mg/dL (H)   08/31/2020 150 mg/dL (H)             Hypertension Follow-up    Do you check your blood pressure regularly outside of the clinic? No   Are you following a low salt diet? No  Are your blood pressures ever more than 140 on the top number (systolic) OR more   than 90 on the bottom number (diastolic), for example 140/90? NA    Diabetic foot exam   1. foot deformity   Yes.  Bunion  2. Current or previous foot ulceration     No  3. Current or previous pre-ulcerative calluses     No  4. previous partial amputation of one or both feet or complete amputation of one foot     No  5. peripheral neuropathy with evidence of callus formation     No  6. poor circulation     No  Approval given for 3 pairs of diabetic shoes and inserts if patient desires.          Review of Systems  Constitutional, HEENT, cardiovascular, pulmonary, gi and gu systems are negative, except as otherwise noted.      Objective    /66   Pulse 73   Temp 97.6  F (36.4  C) (Tympanic)   Wt 104.8 kg (231 lb)   SpO2 96%   BMI 33.15 kg/m    Body mass index is 33.15 kg/m .  Physical Exam   GENERAL: alert and no distress  NECK: no adenopathy, no asymmetry, masses, or scars  RESP: lungs clear to auscultation - no rales, rhonchi or wheezes  CV: regular rate and rhythm, normal S1 S2, no S3 or S4, no murmur, click or rub, no peripheral edema  ABDOMEN: soft, nontender, no hepatosplenomegaly, no masses and bowel sounds normal  MS: no gross musculoskeletal defects noted, no edema    Labs pending.         Signed Electronically by: Yosef Gonzales MD

## 2024-04-22 ENCOUNTER — LAB (OUTPATIENT)
Dept: LAB | Facility: HOSPITAL | Age: 69
End: 2024-04-22
Payer: COMMERCIAL

## 2024-04-22 DIAGNOSIS — E78.2 MIXED HYPERLIPIDEMIA: ICD-10-CM

## 2024-04-22 DIAGNOSIS — E11.9 TYPE 2 DIABETES MELLITUS WITHOUT COMPLICATION, WITHOUT LONG-TERM CURRENT USE OF INSULIN (H): ICD-10-CM

## 2024-04-22 LAB
ALBUMIN SERPL BCG-MCNC: 4.3 G/DL (ref 3.5–5.2)
ALP SERPL-CCNC: 73 U/L (ref 40–150)
ALT SERPL W P-5'-P-CCNC: 21 U/L (ref 0–50)
ANION GAP SERPL CALCULATED.3IONS-SCNC: 9 MMOL/L (ref 7–15)
AST SERPL W P-5'-P-CCNC: 21 U/L (ref 0–45)
BILIRUB SERPL-MCNC: 0.3 MG/DL
BUN SERPL-MCNC: 17.5 MG/DL (ref 8–23)
CALCIUM SERPL-MCNC: 9.2 MG/DL (ref 8.8–10.2)
CHLORIDE SERPL-SCNC: 102 MMOL/L (ref 98–107)
CHOLEST SERPL-MCNC: 151 MG/DL
CREAT SERPL-MCNC: 0.73 MG/DL (ref 0.51–0.95)
DEPRECATED HCO3 PLAS-SCNC: 28 MMOL/L (ref 22–29)
EGFRCR SERPLBLD CKD-EPI 2021: 90 ML/MIN/1.73M2
EST. AVERAGE GLUCOSE BLD GHB EST-MCNC: 137 MG/DL
FASTING STATUS PATIENT QL REPORTED: ABNORMAL
GLUCOSE SERPL-MCNC: 152 MG/DL (ref 70–99)
HBA1C MFR BLD: 6.4 %
HDLC SERPL-MCNC: 40 MG/DL
LDLC SERPL CALC-MCNC: 49 MG/DL
NONHDLC SERPL-MCNC: 111 MG/DL
POTASSIUM SERPL-SCNC: 4.2 MMOL/L (ref 3.4–5.3)
PROT SERPL-MCNC: 7 G/DL (ref 6.4–8.3)
SODIUM SERPL-SCNC: 139 MMOL/L (ref 135–145)
TRIGL SERPL-MCNC: 312 MG/DL

## 2024-04-22 PROCEDURE — 36415 COLL VENOUS BLD VENIPUNCTURE: CPT

## 2024-04-22 PROCEDURE — 83036 HEMOGLOBIN GLYCOSYLATED A1C: CPT

## 2024-04-22 PROCEDURE — 80053 COMPREHEN METABOLIC PANEL: CPT

## 2024-04-22 PROCEDURE — 80061 LIPID PANEL: CPT

## 2024-04-23 ENCOUNTER — OFFICE VISIT (OUTPATIENT)
Dept: FAMILY MEDICINE | Facility: OTHER | Age: 69
End: 2024-04-23
Attending: FAMILY MEDICINE
Payer: COMMERCIAL

## 2024-04-23 VITALS
OXYGEN SATURATION: 96 % | SYSTOLIC BLOOD PRESSURE: 128 MMHG | TEMPERATURE: 97.6 F | DIASTOLIC BLOOD PRESSURE: 66 MMHG | WEIGHT: 231 LBS | BODY MASS INDEX: 33.15 KG/M2 | HEART RATE: 73 BPM

## 2024-04-23 DIAGNOSIS — M48.061 SPINAL STENOSIS, LUMBAR REGION, WITHOUT NEUROGENIC CLAUDICATION: ICD-10-CM

## 2024-04-23 DIAGNOSIS — Z87.891 PERSONAL HISTORY OF TOBACCO USE: Primary | ICD-10-CM

## 2024-04-23 DIAGNOSIS — E78.2 MIXED HYPERLIPIDEMIA: ICD-10-CM

## 2024-04-23 DIAGNOSIS — B02.9 HERPES ZOSTER WITHOUT COMPLICATION: ICD-10-CM

## 2024-04-23 DIAGNOSIS — E11.9 TYPE 2 DIABETES MELLITUS WITHOUT COMPLICATION, WITHOUT LONG-TERM CURRENT USE OF INSULIN (H): ICD-10-CM

## 2024-04-23 LAB
CREAT UR-MCNC: 115 MG/DL
MICROALBUMIN UR-MCNC: <12 MG/L
MICROALBUMIN/CREAT UR: NORMAL MG/G{CREAT}

## 2024-04-23 PROCEDURE — G0463 HOSPITAL OUTPT CLINIC VISIT: HCPCS

## 2024-04-23 PROCEDURE — 82570 ASSAY OF URINE CREATININE: CPT | Mod: ZL | Performed by: FAMILY MEDICINE

## 2024-04-23 PROCEDURE — 99207 PR FOOT EXAM NO CHARGE: CPT | Performed by: FAMILY MEDICINE

## 2024-04-23 PROCEDURE — 99214 OFFICE O/P EST MOD 30 MIN: CPT | Performed by: FAMILY MEDICINE

## 2024-04-23 ASSESSMENT — PAIN SCALES - GENERAL: PAINLEVEL: MODERATE PAIN (4)

## 2024-05-16 ENCOUNTER — TRANSFERRED RECORDS (OUTPATIENT)
Dept: HEALTH INFORMATION MANAGEMENT | Facility: CLINIC | Age: 69
End: 2024-05-16

## 2024-05-16 LAB — RETINOPATHY: NEGATIVE

## 2024-07-22 NOTE — PROGRESS NOTES
"  Assessment & Plan     Type 2 diabetes mellitus without complication, without long-term current use of insulin (H)  Stable and doing well.  Update labs nice and stable.  She's working diet/ex and hasn't really wanted a medicine.      Elevated BP without diagnosis of hypertension  Recheck stable.  Continue to follow.      Acute reaction to stress  Stable on the effexor.  No changes.  She will let me know if something needs to change with that pill.        (M81.0) Age-related osteoporosis without current pathological fracture  Comment: stable.   Plan: no change.  She forgot the fosamax for a while and she will get back on it.                BMI  Estimated body mass index is 33.58 kg/m  as calculated from the following:    Height as of 12/5/23: 1.778 m (5' 10\").    Weight as of this encounter: 106.1 kg (234 lb).             No follow-ups on file.    Antonieta Meehan is a 69 year old, presenting for the following health issues:  Diabetes        7/24/2024     8:31 AM   Additional Questions   Roomed by Raeann   Accompanied by self     HPI     Diabetes Follow-up    How often are you checking your blood sugar? Not at all  What concerns do you have today about your diabetes? None   Do you have any of these symptoms? (Select all that apply)  No numbness or tingling in feet.  No redness, sores or blisters on feet.  No complaints of excessive thirst.  No reports of blurry vision.  No significant changes to weight.      BP Readings from Last 2 Encounters:   07/24/24 (!) 148/76   04/23/24 128/66     Hemoglobin A1C (%)   Date Value   04/22/2024 6.4 (H)   12/05/2023 6.3 (H)   02/23/2021 6.1 (H)     LDL Cholesterol Calculated   Date Value   04/22/2024 49 mg/dL   01/22/2024      Comment:     Cannot estimate LDL when triglyceride exceeds 400 mg/dL   02/23/2021 130 mg/dL (H)   08/31/2020 150 mg/dL (H)                 Review of Systems  Constitutional, HEENT, cardiovascular, pulmonary, gi and gu systems are negative, except as otherwise " noted.      Objective    BP (!) 148/76   Pulse 75   Temp 97.2  F (36.2  C) (Tympanic)   Wt 106.1 kg (234 lb)   SpO2 98%   BMI 33.58 kg/m    Body mass index is 33.58 kg/m .  Physical Exam   GENERAL: alert and no distress  NECK: no adenopathy, no asymmetry, masses, or scars  RESP: lungs clear to auscultation - no rales, rhonchi or wheezes  CV: regular rate and rhythm, normal S1 S2, no S3 or S4, no murmur, click or rub, no peripheral edema  ABDOMEN: soft, nontender, no hepatosplenomegaly, no masses and bowel sounds normal  MS: no gross musculoskeletal defects noted, no edema    Labs stable as above.  Reviewed with her.          Signed Electronically by: Yosef Gonzales MD

## 2024-07-24 ENCOUNTER — LAB (OUTPATIENT)
Dept: LAB | Facility: OTHER | Age: 69
End: 2024-07-24
Attending: FAMILY MEDICINE
Payer: COMMERCIAL

## 2024-07-24 ENCOUNTER — OFFICE VISIT (OUTPATIENT)
Dept: FAMILY MEDICINE | Facility: OTHER | Age: 69
End: 2024-07-24
Attending: FAMILY MEDICINE
Payer: COMMERCIAL

## 2024-07-24 VITALS
HEART RATE: 75 BPM | SYSTOLIC BLOOD PRESSURE: 134 MMHG | TEMPERATURE: 97.2 F | DIASTOLIC BLOOD PRESSURE: 74 MMHG | BODY MASS INDEX: 33.58 KG/M2 | OXYGEN SATURATION: 98 % | WEIGHT: 234 LBS

## 2024-07-24 DIAGNOSIS — F43.0 ACUTE REACTION TO STRESS: ICD-10-CM

## 2024-07-24 DIAGNOSIS — E11.9 TYPE 2 DIABETES MELLITUS WITHOUT COMPLICATION, WITHOUT LONG-TERM CURRENT USE OF INSULIN (H): ICD-10-CM

## 2024-07-24 DIAGNOSIS — R03.0 ELEVATED BP WITHOUT DIAGNOSIS OF HYPERTENSION: ICD-10-CM

## 2024-07-24 DIAGNOSIS — M81.0 AGE-RELATED OSTEOPOROSIS WITHOUT CURRENT PATHOLOGICAL FRACTURE: ICD-10-CM

## 2024-07-24 DIAGNOSIS — E11.9 TYPE 2 DIABETES MELLITUS WITHOUT COMPLICATION, WITHOUT LONG-TERM CURRENT USE OF INSULIN (H): Primary | ICD-10-CM

## 2024-07-24 LAB
ANION GAP SERPL CALCULATED.3IONS-SCNC: 10 MMOL/L (ref 7–15)
BUN SERPL-MCNC: 15.6 MG/DL (ref 8–23)
CALCIUM SERPL-MCNC: 9.5 MG/DL (ref 8.8–10.4)
CHLORIDE SERPL-SCNC: 104 MMOL/L (ref 98–107)
CREAT SERPL-MCNC: 0.74 MG/DL (ref 0.51–0.95)
EGFRCR SERPLBLD CKD-EPI 2021: 88 ML/MIN/1.73M2
EST. AVERAGE GLUCOSE BLD GHB EST-MCNC: 137 MG/DL
GLUCOSE SERPL-MCNC: 136 MG/DL (ref 70–99)
HBA1C MFR BLD: 6.4 %
HCO3 SERPL-SCNC: 27 MMOL/L (ref 22–29)
POTASSIUM SERPL-SCNC: 4.5 MMOL/L (ref 3.4–5.3)
SODIUM SERPL-SCNC: 141 MMOL/L (ref 135–145)

## 2024-07-24 PROCEDURE — G0463 HOSPITAL OUTPT CLINIC VISIT: HCPCS | Performed by: FAMILY MEDICINE

## 2024-07-24 PROCEDURE — 80048 BASIC METABOLIC PNL TOTAL CA: CPT | Mod: ZL

## 2024-07-24 PROCEDURE — G2211 COMPLEX E/M VISIT ADD ON: HCPCS | Performed by: FAMILY MEDICINE

## 2024-07-24 PROCEDURE — 99214 OFFICE O/P EST MOD 30 MIN: CPT | Performed by: FAMILY MEDICINE

## 2024-07-24 PROCEDURE — 36415 COLL VENOUS BLD VENIPUNCTURE: CPT | Mod: ZL

## 2024-07-24 PROCEDURE — 83036 HEMOGLOBIN GLYCOSYLATED A1C: CPT | Mod: ZL

## 2024-07-24 ASSESSMENT — PAIN SCALES - GENERAL: PAINLEVEL: NO PAIN (0)

## 2025-01-11 ENCOUNTER — HEALTH MAINTENANCE LETTER (OUTPATIENT)
Age: 70
End: 2025-01-11

## 2025-01-23 DIAGNOSIS — F43.0 ACUTE REACTION TO STRESS: ICD-10-CM

## 2025-01-23 DIAGNOSIS — E78.2 MIXED HYPERLIPIDEMIA: ICD-10-CM

## 2025-01-23 RX ORDER — ROSUVASTATIN CALCIUM 20 MG/1
20 TABLET, COATED ORAL DAILY
Qty: 90 TABLET | Refills: 0 | Status: SHIPPED | OUTPATIENT
Start: 2025-01-23

## 2025-01-23 RX ORDER — VENLAFAXINE HYDROCHLORIDE 75 MG/1
150 CAPSULE, EXTENDED RELEASE ORAL DAILY
Qty: 180 CAPSULE | Refills: 0 | Status: SHIPPED | OUTPATIENT
Start: 2025-01-23

## 2025-01-23 NOTE — TELEPHONE ENCOUNTER
Crestor       Last Written Prescription Date:  1/23/24  Last Fill Quantity: 90,   # refills: 3  Last Office Visit: 7/24/24  Future Office visit:    Next 5 appointments (look out 90 days)      Mar 06, 2025 10:45 AM  (Arrive by 10:30 AM)  Provider Visit with Yosef Gonzales MD  [facility] [address]  [facility] [place] [address]  [phone]             Routing refill request to provider for review/approval because:      Effexor       Last Written Prescription Date:  12/5/23  Last Fill Quantity: 180,   # refills: 3  Last Office Visit: 7/24/24  Future Office visit:    Next 5 appointments (look out 90 days)      Mar 06, 2025 10:45 AM  (Arrive by 10:30 AM)  Provider Visit with Yosef Gonzales MD  [facility] [address]  [facility] [place] [address]  [phone]             Routing refill request to provider for review/approval because:

## 2025-02-09 ENCOUNTER — HEALTH MAINTENANCE LETTER (OUTPATIENT)
Age: 70
End: 2025-02-09

## 2025-03-06 ENCOUNTER — OFFICE VISIT (OUTPATIENT)
Dept: FAMILY MEDICINE | Facility: OTHER | Age: 70
End: 2025-03-06
Attending: FAMILY MEDICINE
Payer: COMMERCIAL

## 2025-03-06 VITALS
WEIGHT: 234.5 LBS | SYSTOLIC BLOOD PRESSURE: 140 MMHG | HEIGHT: 70 IN | TEMPERATURE: 97.8 F | DIASTOLIC BLOOD PRESSURE: 84 MMHG | HEART RATE: 83 BPM | RESPIRATION RATE: 17 BRPM | BODY MASS INDEX: 33.57 KG/M2 | OXYGEN SATURATION: 96 %

## 2025-03-06 DIAGNOSIS — F43.0 ACUTE REACTION TO STRESS: ICD-10-CM

## 2025-03-06 DIAGNOSIS — F17.211 NICOTINE DEPENDENCE, CIGARETTES, IN REMISSION: ICD-10-CM

## 2025-03-06 DIAGNOSIS — E78.2 MIXED HYPERLIPIDEMIA: ICD-10-CM

## 2025-03-06 DIAGNOSIS — M81.0 OSTEOPOROSIS, UNSPECIFIED OSTEOPOROSIS TYPE, UNSPECIFIED PATHOLOGICAL FRACTURE PRESENCE: ICD-10-CM

## 2025-03-06 DIAGNOSIS — E11.9 TYPE 2 DIABETES MELLITUS WITHOUT COMPLICATION, WITHOUT LONG-TERM CURRENT USE OF INSULIN (H): Primary | ICD-10-CM

## 2025-03-06 LAB
ALBUMIN SERPL BCG-MCNC: 4.6 G/DL (ref 3.5–5.2)
ALP SERPL-CCNC: 74 U/L (ref 40–150)
ALT SERPL W P-5'-P-CCNC: 27 U/L (ref 0–50)
ANION GAP SERPL CALCULATED.3IONS-SCNC: 11 MMOL/L (ref 7–15)
AST SERPL W P-5'-P-CCNC: 18 U/L (ref 0–45)
BILIRUB SERPL-MCNC: 0.3 MG/DL
BUN SERPL-MCNC: 19.6 MG/DL (ref 8–23)
CALCIUM SERPL-MCNC: 9.3 MG/DL (ref 8.8–10.4)
CHLORIDE SERPL-SCNC: 102 MMOL/L (ref 98–107)
CHOLEST SERPL-MCNC: 300 MG/DL
CREAT SERPL-MCNC: 0.74 MG/DL (ref 0.51–0.95)
CREAT UR-MCNC: 88.2 MG/DL
EGFRCR SERPLBLD CKD-EPI 2021: 88 ML/MIN/1.73M2
EST. AVERAGE GLUCOSE BLD GHB EST-MCNC: 143 MG/DL
FASTING STATUS PATIENT QL REPORTED: YES
FASTING STATUS PATIENT QL REPORTED: YES
GLUCOSE SERPL-MCNC: 123 MG/DL (ref 70–99)
HBA1C MFR BLD: 6.6 %
HCO3 SERPL-SCNC: 26 MMOL/L (ref 22–29)
HDLC SERPL-MCNC: 38 MG/DL
LDLC SERPL CALC-MCNC: ABNORMAL MG/DL
MICROALBUMIN UR-MCNC: <12 MG/L
MICROALBUMIN/CREAT UR: NORMAL MG/G{CREAT}
NONHDLC SERPL-MCNC: 262 MG/DL
POTASSIUM SERPL-SCNC: 4.2 MMOL/L (ref 3.4–5.3)
PROT SERPL-MCNC: 7.3 G/DL (ref 6.4–8.3)
SODIUM SERPL-SCNC: 139 MMOL/L (ref 135–145)
TRIGL SERPL-MCNC: 594 MG/DL
TSH SERPL DL<=0.005 MIU/L-ACNC: 1.07 UIU/ML (ref 0.3–4.2)

## 2025-03-06 PROCEDURE — G0463 HOSPITAL OUTPT CLINIC VISIT: HCPCS

## 2025-03-06 PROCEDURE — 84443 ASSAY THYROID STIM HORMONE: CPT | Mod: ZL | Performed by: FAMILY MEDICINE

## 2025-03-06 PROCEDURE — 82465 ASSAY BLD/SERUM CHOLESTEROL: CPT | Mod: ZL | Performed by: FAMILY MEDICINE

## 2025-03-06 PROCEDURE — 36415 COLL VENOUS BLD VENIPUNCTURE: CPT | Mod: ZL | Performed by: FAMILY MEDICINE

## 2025-03-06 PROCEDURE — 82043 UR ALBUMIN QUANTITATIVE: CPT | Mod: ZL | Performed by: FAMILY MEDICINE

## 2025-03-06 PROCEDURE — 82040 ASSAY OF SERUM ALBUMIN: CPT | Mod: ZL | Performed by: FAMILY MEDICINE

## 2025-03-06 PROCEDURE — 82570 ASSAY OF URINE CREATININE: CPT | Mod: ZL | Performed by: FAMILY MEDICINE

## 2025-03-06 PROCEDURE — 83036 HEMOGLOBIN GLYCOSYLATED A1C: CPT | Mod: ZL | Performed by: FAMILY MEDICINE

## 2025-03-06 RX ORDER — VENLAFAXINE HYDROCHLORIDE 75 MG/1
150 CAPSULE, EXTENDED RELEASE ORAL DAILY
Qty: 180 CAPSULE | Refills: 3 | Status: SHIPPED | OUTPATIENT
Start: 2025-03-06

## 2025-03-06 RX ORDER — ROSUVASTATIN CALCIUM 20 MG/1
20 TABLET, COATED ORAL DAILY
Qty: 90 TABLET | Refills: 3 | Status: SHIPPED | OUTPATIENT
Start: 2025-03-06

## 2025-03-06 RX ORDER — ALENDRONATE SODIUM 70 MG/1
70 TABLET ORAL
Qty: 12 TABLET | Refills: 3 | Status: SHIPPED | OUTPATIENT
Start: 2025-03-06

## 2025-03-06 ASSESSMENT — PAIN SCALES - GENERAL: PAINLEVEL_OUTOF10: NO PAIN (0)

## 2025-03-06 NOTE — PROGRESS NOTES
"  Assessment & Plan     Type 2 diabetes mellitus without complication, without long-term current use of insulin (H)  Has been eating more and not watching things.  Just got .  Update full labs and then we can use those as our new baseline and get back on [facility].  Full labs pending.  Nice review.    - TSH with free T4 reflex; Future  - Hemoglobin A1c; Future  - Comprehensive metabolic panel; Future  - Lipid Profile; Future  - Albumin Random Urine Quantitative with Creat Ratio; Future  - GA FOOT EXAM NO CHARGE  - TSH with free T4 reflex  - Hemoglobin A1c  - Comprehensive metabolic panel  - Lipid Profile  - Albumin Random Urine Quantitative with Creat Ratio    Mixed hyperlipidemia  As above.    - Comprehensive metabolic panel; Future  - Lipid Profile; Future  - rosuvastatin (CRESTOR) 20 MG tablet; Take 1 tablet (20 mg) by mouth daily.  - Comprehensive metabolic panel  - Lipid Profile    P  Acute reaction to stress  Continue effexor.  Sent for the year.    - venlafaxine (EFFEXOR XR) 75 MG 24 hr capsule; Take 2 capsules (150 mg) by mouth daily.    Osteoporosis, unspecified osteoporosis type, unspecified pathological fracture presence  Continue fosamax.  Sent for the year.    - alendronate (FOSAMAX) 70 MG tablet; Take 1 tablet (70 mg) by mouth every 7 days.    Nicotine dependence, cigarettes, in remission  Update CT chest.    - CT Chest Lung Cancer Screen Low Dose Without; Future          Nicotine/Tobacco Cessation  She reports that she has been smoking other. She has never used smokeless tobacco.  Nicotine/Tobacco Cessation Plan  Information offered: Patient not interested at this time      BMI  Estimated body mass index is 33.65 kg/m  as calculated from the following:    Height as of this encounter: 1.778 m (5' 10\").    Weight as of this encounter: 106.4 kg (234 lb 8 oz).             No follow-ups on file.    Antonieta Meehan is a 69 year old, presenting for the following health issues:  No chief complaint on " "file.    HPI      Diabetes Follow-up    How often are you checking your blood sugar? Not at all  What concerns do you have today about your diabetes? None   Do you have any of these symptoms? (Select all that apply)  No numbness or tingling in feet.  No redness, sores or blisters on feet.  No complaints of excessive thirst.  No reports of blurry vision.  No significant changes to weight.      BP Readings from Last 2 Encounters:   03/06/25 (!) 140/84   07/24/24 134/74     Hemoglobin A1C (%)   Date Value   07/24/2024 6.4 (H)   04/22/2024 6.4 (H)   02/23/2021 6.1 (H)     LDL Cholesterol Calculated   Date Value   04/22/2024 49 mg/dL   01/22/2024      Comment:     Cannot estimate LDL when triglyceride exceeds 400 mg/dL   02/23/2021 130 mg/dL (H)   08/31/2020 150 mg/dL (H)             Hyperlipidemia Follow-Up    Are you regularly taking any medication or supplement to lower your cholesterol?   Yes- Crestor  Are you having muscle aches or other side effects that you think could be caused by your cholesterol lowering medication?  No    Diabetic Foot Screen:  Any complaints of increased pain or numbness ? No  Is there a foot ulcer now or a history of foot ulcer? No  Does the foot have an abnormal shape? No  Are the nails thick, too long or ingrown? No  Are there any redness or open areas? No         Sensation Testing done at all points on the diagram with monofilament     Right Foot: Sensation Normal at all points  Left Foot: Sensation Normal at all points     Risk Category: 0- No loss of protective sensation  Performed by             Review of Systems  Constitutional, HEENT, cardiovascular, pulmonary, gi and gu systems are negative, except as otherwise noted.      Objective    BP (!) 140/84   Pulse 83   Temp 97.8  F (36.6  C) (Tympanic)   Resp 17   Ht 1.778 m (5' 10\")   Wt 106.4 kg (234 lb 8 oz)   SpO2 96%   BMI 33.65 kg/m    Body mass index is 33.65 kg/m .  Physical Exam   GENERAL: alert and no distress  EYES: Eyes " grossly normal to inspection, PERRL and conjunctivae and sclerae normal  HENT: ear canals and TM's normal, nose and mouth without ulcers or lesions  NECK: no adenopathy, no asymmetry, masses, or scars  RESP: lungs clear to auscultation - no rales, rhonchi or wheezes  CV: regular rate and rhythm, normal S1 S2, no S3 or S4, no murmur, click or rub, no peripheral edema  ABDOMEN: soft, nontender, no hepatosplenomegaly, no masses and bowel sounds normal  MS: no gross musculoskeletal defects noted, no edema    Full annual labs pending as above.      The longitudinal plan of care for the diagnosis(es)/condition(s) as documented were addressed during this visit. Due to the added complexity in care, I will continue to support Shefali in the subsequent management and with ongoing continuity of care.        Signed Electronically by: Yosef Gonzales MD

## (undated) DEVICE — BLADE-SAGITTAL NARROW THICK NO-OFFSET

## (undated) DEVICE — BLADE-SAGITTAL 25MM X 90MM X 1.19MM

## (undated) DEVICE — ICEMAN W/UNIVERSAL WRAP-ON PAD

## (undated) DEVICE — IRRIGATION-H2O 1000ML

## (undated) DEVICE — LIGHT HANDLE COVER

## (undated) DEVICE — LABEL-STERILE PREPRINTED FOR OR

## (undated) DEVICE — DRSG-AQUACEL AG 3.5" X 14" SURGICAL

## (undated) DEVICE — CAUTERY PENCIL-W/SUCTION 8FR HANDSWITCHING

## (undated) DEVICE — NDL-25G 1-1/2" NON-SAFETY

## (undated) DEVICE — CUFF-DISP STERILE 30IN SINGLE BLADDER

## (undated) DEVICE — IRRIGATION-NACL 1000ML

## (undated) DEVICE — PACK-KNEE-CUSTOM

## (undated) DEVICE — TRAP-POLYP E-TRAP

## (undated) DEVICE — CANISTER-SUCTION 2000CC

## (undated) DEVICE — TUBING-SUCTION 20FT

## (undated) DEVICE — BDG-ELASTIC 6 INCH DBL. STERILE

## (undated) DEVICE — CAST PADDING-STERILE 6"

## (undated) DEVICE — GLV-7.5 ORTHO

## (undated) DEVICE — SUTURE-VICRYL 2-0 CT-1 J945H

## (undated) DEVICE — SNARE-EXACTO COLD 9MM

## (undated) DEVICE — DRILL PIN HEADLESS TROCAR

## (undated) DEVICE — SCD SLEEVE-KNEE REG.

## (undated) DEVICE — IRRIGATION-NACL 3000ML (BAG)

## (undated) DEVICE — CONNECTOR-ERBEFLO 2 PORT

## (undated) DEVICE — FORCEP-COLON BIOPSY LARGE W/NEEDLE 240CM

## (undated) DEVICE — PULSE LAVAGE IRRIGATION SYSTEM

## (undated) DEVICE — TOPICAL SKIN ADHESIVE EXOFIN

## (undated) DEVICE — Device

## (undated) DEVICE — SYRINGE-30CC LUER LOCK

## (undated) DEVICE — SKIN PREP-APPLICATOR PREP DURAPREP

## (undated) DEVICE — CAUTERY PAD-POLYHESIVE II ADULT

## (undated) DEVICE — CEMENT MIXER-MIXEVAC III

## (undated) DEVICE — PATELLA REAMER BLADE

## (undated) DEVICE — SCREW-FEMALE HEX 2.5MM/25MM LENGTH

## (undated) DEVICE — GLV-8.0 PROTEXIS PI BLUE W/NEU-THERA LF/PF

## (undated) DEVICE — SUTURE-VICRYL 1 CT-1 POP-OFF J741D

## (undated) RX ORDER — PROPOFOL 10 MG/ML
INJECTION, EMULSION INTRAVENOUS
Status: DISPENSED
Start: 2019-09-12

## (undated) RX ORDER — PROPOFOL 10 MG/ML
INJECTION, EMULSION INTRAVENOUS
Status: DISPENSED
Start: 2022-05-27

## (undated) RX ORDER — LIDOCAINE HYDROCHLORIDE 20 MG/ML
INJECTION, SOLUTION EPIDURAL; INFILTRATION; INTRACAUDAL; PERINEURAL
Status: DISPENSED
Start: 2022-05-27

## (undated) RX ORDER — DEXAMETHASONE SODIUM PHOSPHATE 10 MG/ML
INJECTION, SOLUTION INTRAMUSCULAR; INTRAVENOUS
Status: DISPENSED
Start: 2019-09-12

## (undated) RX ORDER — FENTANYL CITRATE 50 UG/ML
INJECTION, SOLUTION INTRAMUSCULAR; INTRAVENOUS
Status: DISPENSED
Start: 2019-09-12

## (undated) RX ORDER — ONDANSETRON 2 MG/ML
INJECTION INTRAMUSCULAR; INTRAVENOUS
Status: DISPENSED
Start: 2019-09-12

## (undated) RX ORDER — FENTANYL CITRATE 50 UG/ML
INJECTION, SOLUTION INTRAMUSCULAR; INTRAVENOUS
Status: DISPENSED
Start: 2022-05-27